# Patient Record
Sex: FEMALE | Race: WHITE | Employment: OTHER | URBAN - METROPOLITAN AREA
[De-identification: names, ages, dates, MRNs, and addresses within clinical notes are randomized per-mention and may not be internally consistent; named-entity substitution may affect disease eponyms.]

---

## 2017-01-18 ENCOUNTER — HOSPITAL ENCOUNTER (OUTPATIENT)
Dept: CT IMAGING | Age: 77
Discharge: HOME | End: 2017-01-18
Attending: INTERNAL MEDICINE | Admitting: NURSE PRACTITIONER

## 2017-01-18 DIAGNOSIS — I71.20 THORACIC AORTIC ANEURYSM WITHOUT RUPTURE: ICD-10-CM

## 2017-04-03 ENCOUNTER — OFFICE VISIT (OUTPATIENT)
Dept: INTERNAL MEDICINE CLINIC | Age: 77
End: 2017-04-03

## 2017-04-03 ENCOUNTER — HOSPITAL ENCOUNTER (OUTPATIENT)
Dept: OTHER | Age: 77
Discharge: HOME | End: 2017-04-03
Attending: NURSE PRACTITIONER | Admitting: NURSE PRACTITIONER

## 2017-04-03 VITALS
TEMPERATURE: 98 F | BODY MASS INDEX: 16.91 KG/M2 | WEIGHT: 97 LBS | DIASTOLIC BLOOD PRESSURE: 82 MMHG | SYSTOLIC BLOOD PRESSURE: 144 MMHG

## 2017-04-03 DIAGNOSIS — M54.50 ACUTE RIGHT-SIDED LOW BACK PAIN WITHOUT SCIATICA: ICD-10-CM

## 2017-04-03 DIAGNOSIS — M54.50 ACUTE RIGHT-SIDED LOW BACK PAIN WITHOUT SCIATICA: Primary | ICD-10-CM

## 2017-04-03 DIAGNOSIS — R09.82 POST-NASAL DRAINAGE: ICD-10-CM

## 2017-04-03 LAB
BILIRUBIN, POC: NORMAL
BLOOD URINE, POC: NORMAL
CLARITY, POC: CLEAR
COLOR, POC: YELLOW
GLUCOSE URINE, POC: NORMAL
KETONES, POC: NORMAL
LEUKOCYTE EST, POC: NORMAL
NITRITE, POC: NORMAL
PH, POC: 6
PROTEIN, POC: NORMAL
SPECIFIC GRAVITY, POC: 1.02
UROBILINOGEN, POC: 0.2

## 2017-04-03 PROCEDURE — G8399 PT W/DXA RESULTS DOCUMENT: HCPCS | Performed by: NURSE PRACTITIONER

## 2017-04-03 PROCEDURE — 1090F PRES/ABSN URINE INCON ASSESS: CPT | Performed by: NURSE PRACTITIONER

## 2017-04-03 PROCEDURE — 99213 OFFICE O/P EST LOW 20 MIN: CPT | Performed by: NURSE PRACTITIONER

## 2017-04-03 PROCEDURE — 4040F PNEUMOC VAC/ADMIN/RCVD: CPT | Performed by: NURSE PRACTITIONER

## 2017-04-03 PROCEDURE — 1036F TOBACCO NON-USER: CPT | Performed by: NURSE PRACTITIONER

## 2017-04-03 PROCEDURE — 1123F ACP DISCUSS/DSCN MKR DOCD: CPT | Performed by: NURSE PRACTITIONER

## 2017-04-03 PROCEDURE — G8419 CALC BMI OUT NRM PARAM NOF/U: HCPCS | Performed by: NURSE PRACTITIONER

## 2017-04-03 PROCEDURE — G8427 DOCREV CUR MEDS BY ELIG CLIN: HCPCS | Performed by: NURSE PRACTITIONER

## 2017-04-03 PROCEDURE — 81002 URINALYSIS NONAUTO W/O SCOPE: CPT | Performed by: NURSE PRACTITIONER

## 2017-04-03 ASSESSMENT — ENCOUNTER SYMPTOMS
NAUSEA: 0
DIARRHEA: 0
VOMITING: 0
SORE THROAT: 0
FACIAL SWELLING: 0
BACK PAIN: 1
SINUS PRESSURE: 0
COUGH: 1

## 2017-04-21 ENCOUNTER — TELEPHONE (OUTPATIENT)
Dept: INTERNAL MEDICINE CLINIC | Age: 77
End: 2017-04-21

## 2017-06-19 ENCOUNTER — TELEPHONE (OUTPATIENT)
Dept: INTERNAL MEDICINE CLINIC | Age: 77
End: 2017-06-19

## 2017-07-06 ENCOUNTER — TELEPHONE (OUTPATIENT)
Dept: INTERNAL MEDICINE CLINIC | Age: 77
End: 2017-07-06

## 2017-07-06 ENCOUNTER — OFFICE VISIT (OUTPATIENT)
Dept: INTERNAL MEDICINE CLINIC | Age: 77
End: 2017-07-06

## 2017-07-06 VITALS
HEART RATE: 60 BPM | SYSTOLIC BLOOD PRESSURE: 154 MMHG | HEIGHT: 64 IN | TEMPERATURE: 98.4 F | DIASTOLIC BLOOD PRESSURE: 98 MMHG | WEIGHT: 94.6 LBS | BODY MASS INDEX: 16.15 KG/M2

## 2017-07-06 DIAGNOSIS — I10 ESSENTIAL HYPERTENSION: ICD-10-CM

## 2017-07-06 DIAGNOSIS — M79.602 LEFT ARM PAIN: Primary | ICD-10-CM

## 2017-07-06 PROCEDURE — 1036F TOBACCO NON-USER: CPT | Performed by: NURSE PRACTITIONER

## 2017-07-06 PROCEDURE — G8427 DOCREV CUR MEDS BY ELIG CLIN: HCPCS | Performed by: NURSE PRACTITIONER

## 2017-07-06 PROCEDURE — 99213 OFFICE O/P EST LOW 20 MIN: CPT | Performed by: NURSE PRACTITIONER

## 2017-07-06 PROCEDURE — G8399 PT W/DXA RESULTS DOCUMENT: HCPCS | Performed by: NURSE PRACTITIONER

## 2017-07-06 PROCEDURE — G8419 CALC BMI OUT NRM PARAM NOF/U: HCPCS | Performed by: NURSE PRACTITIONER

## 2017-07-06 PROCEDURE — 1090F PRES/ABSN URINE INCON ASSESS: CPT | Performed by: NURSE PRACTITIONER

## 2017-07-06 PROCEDURE — 4040F PNEUMOC VAC/ADMIN/RCVD: CPT | Performed by: NURSE PRACTITIONER

## 2017-07-06 PROCEDURE — 1123F ACP DISCUSS/DSCN MKR DOCD: CPT | Performed by: NURSE PRACTITIONER

## 2017-07-06 ASSESSMENT — ENCOUNTER SYMPTOMS
COUGH: 0
BLOOD IN STOOL: 0
COLOR CHANGE: 0
SHORTNESS OF BREATH: 0
ABDOMINAL PAIN: 0
CONSTIPATION: 0

## 2017-07-17 ENCOUNTER — OFFICE VISIT (OUTPATIENT)
Dept: INTERNAL MEDICINE CLINIC | Age: 77
End: 2017-07-17

## 2017-07-17 VITALS
BODY MASS INDEX: 16.39 KG/M2 | SYSTOLIC BLOOD PRESSURE: 134 MMHG | WEIGHT: 94 LBS | DIASTOLIC BLOOD PRESSURE: 78 MMHG | HEART RATE: 68 BPM | OXYGEN SATURATION: 97 %

## 2017-07-17 DIAGNOSIS — I77.9 CAROTID ARTERY DISEASE, UNSPECIFIED LATERALITY (HCC): ICD-10-CM

## 2017-07-17 DIAGNOSIS — R25.2 CRAMPING OF FEET: ICD-10-CM

## 2017-07-17 DIAGNOSIS — M81.0 OSTEOPOROSIS: ICD-10-CM

## 2017-07-17 DIAGNOSIS — I71.20 THORACIC AORTIC ANEURYSM WITHOUT RUPTURE: ICD-10-CM

## 2017-07-17 DIAGNOSIS — I10 ESSENTIAL HYPERTENSION: Primary | ICD-10-CM

## 2017-07-17 DIAGNOSIS — F43.9 STRESS AT HOME: ICD-10-CM

## 2017-07-17 DIAGNOSIS — R63.4 WEIGHT LOSS: ICD-10-CM

## 2017-07-17 DIAGNOSIS — R13.10 DYSPHAGIA, UNSPECIFIED TYPE: ICD-10-CM

## 2017-07-17 DIAGNOSIS — R19.8 CHANGE IN BOWEL MOVEMENT: ICD-10-CM

## 2017-07-17 DIAGNOSIS — E78.2 MIXED HYPERLIPIDEMIA: ICD-10-CM

## 2017-07-17 PROCEDURE — 1036F TOBACCO NON-USER: CPT | Performed by: NURSE PRACTITIONER

## 2017-07-17 PROCEDURE — 4040F PNEUMOC VAC/ADMIN/RCVD: CPT | Performed by: NURSE PRACTITIONER

## 2017-07-17 PROCEDURE — 99214 OFFICE O/P EST MOD 30 MIN: CPT | Performed by: NURSE PRACTITIONER

## 2017-07-17 PROCEDURE — G8399 PT W/DXA RESULTS DOCUMENT: HCPCS | Performed by: NURSE PRACTITIONER

## 2017-07-17 PROCEDURE — 1090F PRES/ABSN URINE INCON ASSESS: CPT | Performed by: NURSE PRACTITIONER

## 2017-07-17 PROCEDURE — G8419 CALC BMI OUT NRM PARAM NOF/U: HCPCS | Performed by: NURSE PRACTITIONER

## 2017-07-17 PROCEDURE — 1123F ACP DISCUSS/DSCN MKR DOCD: CPT | Performed by: NURSE PRACTITIONER

## 2017-07-17 PROCEDURE — 4005F PHARM THX FOR OP RXD: CPT | Performed by: NURSE PRACTITIONER

## 2017-07-17 PROCEDURE — G8427 DOCREV CUR MEDS BY ELIG CLIN: HCPCS | Performed by: NURSE PRACTITIONER

## 2017-07-17 ASSESSMENT — ENCOUNTER SYMPTOMS
SORE THROAT: 0
FACIAL SWELLING: 0
DIARRHEA: 0
VOMITING: 0
ABDOMINAL PAIN: 1
COUGH: 0
NAUSEA: 1
SINUS PRESSURE: 0

## 2017-08-07 ENCOUNTER — HOSPITAL ENCOUNTER (OUTPATIENT)
Dept: CT IMAGING | Age: 77
Discharge: HOME | End: 2017-08-07
Attending: NURSE PRACTITIONER | Admitting: NURSE PRACTITIONER

## 2017-08-07 DIAGNOSIS — R19.8 CHANGE IN BOWEL MOVEMENT: ICD-10-CM

## 2017-08-07 DIAGNOSIS — R63.4 ABNORMAL WEIGHT LOSS: ICD-10-CM

## 2017-08-07 DIAGNOSIS — R63.4 WEIGHT LOSS: ICD-10-CM

## 2017-08-07 DIAGNOSIS — E78.2 MIXED HYPERLIPIDEMIA: ICD-10-CM

## 2017-08-07 DIAGNOSIS — R13.10 DYSPHAGIA, UNSPECIFIED TYPE: ICD-10-CM

## 2017-08-07 LAB
A/G RATIO: 1.2 (ref 1.1–2.2)
ALBUMIN SERPL-MCNC: 3.9 G/DL (ref 3.4–5)
ALP BLD-CCNC: 68 U/L (ref 40–129)
ALT SERPL-CCNC: 19 U/L (ref 10–40)
ANION GAP SERPL CALCULATED.3IONS-SCNC: 10 MMOL/L (ref 3–16)
AST SERPL-CCNC: 27 U/L (ref 15–37)
BASOPHILS ABSOLUTE: 0 K/UL (ref 0–0.2)
BASOPHILS RELATIVE PERCENT: 0.8 %
BILIRUB SERPL-MCNC: 0.4 MG/DL (ref 0–1)
BUN BLDV-MCNC: 31 MG/DL (ref 7–20)
CALCIUM SERPL-MCNC: 10.3 MG/DL (ref 8.3–10.6)
CHLORIDE BLD-SCNC: 101 MMOL/L (ref 99–110)
CO2: 29 MMOL/L (ref 21–32)
CREAT SERPL-MCNC: 0.7 MG/DL (ref 0.6–1.2)
EOSINOPHILS ABSOLUTE: 0.1 K/UL (ref 0–0.6)
EOSINOPHILS RELATIVE PERCENT: 2.6 %
GFR AFRICAN AMERICAN: >60
GFR NON-AFRICAN AMERICAN: >60
GLOBULIN: 3.3 G/DL
GLUCOSE FASTING: 90 MG/DL (ref 70–99)
HCT VFR BLD CALC: 36 % (ref 36–48)
HEMOGLOBIN: 12.2 G/DL (ref 12–16)
LYMPHOCYTES ABSOLUTE: 1.4 K/UL (ref 1–5.1)
LYMPHOCYTES RELATIVE PERCENT: 27.6 %
MCH RBC QN AUTO: 32 PG (ref 26–34)
MCHC RBC AUTO-ENTMCNC: 33.9 G/DL (ref 31–36)
MCV RBC AUTO: 94.6 FL (ref 80–100)
MONOCYTES ABSOLUTE: 0.4 K/UL (ref 0–1.3)
MONOCYTES RELATIVE PERCENT: 8.3 %
NEUTROPHILS ABSOLUTE: 3 K/UL (ref 1.7–7.7)
NEUTROPHILS RELATIVE PERCENT: 60.7 %
PDW BLD-RTO: 14.6 % (ref 12.4–15.4)
PLATELET # BLD: 221 K/UL (ref 135–450)
PMV BLD AUTO: 7.3 FL (ref 5–10.5)
POTASSIUM SERPL-SCNC: 4.2 MMOL/L (ref 3.5–5.1)
RBC # BLD: 3.81 M/UL (ref 4–5.2)
SODIUM BLD-SCNC: 140 MMOL/L (ref 136–145)
TOTAL PROTEIN: 7.2 G/DL (ref 6.4–8.2)
WBC # BLD: 5 K/UL (ref 4–11)

## 2017-08-08 LAB
CHOLESTEROL, FASTING: 192 MG/DL (ref 0–199)
HDLC SERPL-MCNC: 81 MG/DL (ref 40–60)
LDL CHOLESTEROL CALCULATED: 98 MG/DL
TRIGLYCERIDE, FASTING: 63 MG/DL (ref 0–150)
VLDLC SERPL CALC-MCNC: 13 MG/DL

## 2017-08-11 ENCOUNTER — TELEPHONE (OUTPATIENT)
Dept: INTERNAL MEDICINE CLINIC | Age: 77
End: 2017-08-11

## 2017-08-17 ENCOUNTER — TELEPHONE (OUTPATIENT)
Dept: INTERNAL MEDICINE CLINIC | Age: 77
End: 2017-08-17

## 2017-08-17 DIAGNOSIS — R10.84 GENERALIZED ABDOMINAL PAIN: ICD-10-CM

## 2017-08-17 DIAGNOSIS — R93.5 ABNORMAL CT OF THE ABDOMEN: Primary | ICD-10-CM

## 2017-08-17 DIAGNOSIS — R93.5 ABNORMAL CT SCAN, PELVIS: ICD-10-CM

## 2017-08-30 ENCOUNTER — TELEPHONE (OUTPATIENT)
Dept: INTERNAL MEDICINE CLINIC | Age: 77
End: 2017-08-30

## 2017-09-07 ENCOUNTER — HOSPITAL ENCOUNTER (OUTPATIENT)
Dept: CT IMAGING | Age: 77
Discharge: HOME | End: 2017-09-07
Attending: NURSE PRACTITIONER | Admitting: NURSE PRACTITIONER

## 2017-09-07 DIAGNOSIS — R93.5 ABNORMAL CT SCAN, PELVIS: ICD-10-CM

## 2017-09-07 DIAGNOSIS — R10.84 GENERALIZED ABDOMINAL PAIN: ICD-10-CM

## 2017-09-07 DIAGNOSIS — R93.5 ABNORMAL CT OF THE ABDOMEN: ICD-10-CM

## 2017-09-07 DIAGNOSIS — R93.5 ABNORMAL FINDINGS ON DIAGNOSTIC IMAGING OF OTHER ABDOMINAL REGIONS, INCLUDING RETROPERITONEUM: ICD-10-CM

## 2017-09-12 ENCOUNTER — TELEPHONE (OUTPATIENT)
Dept: INTERNAL MEDICINE CLINIC | Age: 77
End: 2017-09-12

## 2017-09-20 ENCOUNTER — TELEPHONE (OUTPATIENT)
Dept: INTERNAL MEDICINE CLINIC | Age: 77
End: 2017-09-20

## 2017-09-20 DIAGNOSIS — R93.5 ABNORMAL CT OF THE ABDOMEN: Primary | ICD-10-CM

## 2017-10-05 ENCOUNTER — HOSPITAL ENCOUNTER (OUTPATIENT)
Dept: MAMMOGRAPHY | Age: 77
Discharge: HOME | End: 2017-10-05
Attending: INTERNAL MEDICINE | Admitting: INTERNAL MEDICINE

## 2017-10-05 DIAGNOSIS — M81.0 AGE-RELATED OSTEOPOROSIS WITHOUT CURRENT PATHOLOGICAL FRACTURE: ICD-10-CM

## 2017-10-05 DIAGNOSIS — M81.0 SENILE OSTEOPOROSIS: ICD-10-CM

## 2017-10-27 DIAGNOSIS — E78.2 MIXED HYPERLIPIDEMIA: ICD-10-CM

## 2017-10-27 RX ORDER — SIMVASTATIN 20 MG
TABLET ORAL
Qty: 90 TABLET | Refills: 0 | Status: SHIPPED | OUTPATIENT
Start: 2017-10-27 | End: 2018-01-29 | Stop reason: SDUPTHER

## 2017-10-27 NOTE — TELEPHONE ENCOUNTER
Refill request for zocor medication.      Name of [facility]- [facility]    Last visit - 7-17-17     Pending visit - 1-15-18    Last refill -6-19-17    Medication Contract signed -   Hermann mena-     Additional Comments

## 2017-11-17 ENCOUNTER — NURSE ONLY (OUTPATIENT)
Dept: INTERNAL MEDICINE CLINIC | Age: 77
End: 2017-11-17

## 2017-11-17 DIAGNOSIS — Z23 NEED FOR PROPHYLACTIC VACCINATION AND INOCULATION AGAINST INFLUENZA: Primary | ICD-10-CM

## 2017-11-17 PROCEDURE — G0008 ADMIN INFLUENZA VIRUS VAC: HCPCS | Performed by: NURSE PRACTITIONER

## 2017-11-17 PROCEDURE — 90662 IIV NO PRSV INCREASED AG IM: CPT | Performed by: NURSE PRACTITIONER

## 2017-11-17 NOTE — PROGRESS NOTES
Vaccine Information Sheet, \"Influenza - Inactivated\"  given to Rickeytommie Valadezs, or parent/legal guardian of  Rickey Washington and verbalized understanding. Patient responses:    Have you ever had a reaction to a flu vaccine? No  Are you able to eat eggs without adverse effects? Yes  Do you have any current illness? No  Have you ever had [place] Syndrome? Yes    Flu vaccine given per order. Please see immunization tab.

## 2017-12-07 DIAGNOSIS — I10 ESSENTIAL HYPERTENSION: ICD-10-CM

## 2017-12-07 RX ORDER — METOPROLOL SUCCINATE 50 MG/1
50 TABLET, EXTENDED RELEASE ORAL DAILY
Qty: 90 TABLET | Refills: 0 | Status: SHIPPED | OUTPATIENT
Start: 2017-12-07 | End: 2019-01-22 | Stop reason: SDUPTHER

## 2017-12-07 NOTE — TELEPHONE ENCOUNTER
Refill request for -----       Metoprolol ER 50 mg           medication.      [id number]    Last visit - 07/17/17     Pending visit -01/15/18    Last refill -12/19/16

## 2018-01-29 ENCOUNTER — OFFICE VISIT (OUTPATIENT)
Dept: INTERNAL MEDICINE CLINIC | Age: 78
End: 2018-01-29

## 2018-01-29 VITALS
SYSTOLIC BLOOD PRESSURE: 136 MMHG | OXYGEN SATURATION: 95 % | HEART RATE: 81 BPM | DIASTOLIC BLOOD PRESSURE: 74 MMHG | WEIGHT: 98 LBS | BODY MASS INDEX: 17.09 KG/M2

## 2018-01-29 DIAGNOSIS — I10 ESSENTIAL HYPERTENSION: ICD-10-CM

## 2018-01-29 DIAGNOSIS — E78.2 MIXED HYPERLIPIDEMIA: ICD-10-CM

## 2018-01-29 DIAGNOSIS — M79.10 MYALGIA: ICD-10-CM

## 2018-01-29 DIAGNOSIS — I77.1 CELIAC ARTERY STENOSIS (HCC): Primary | ICD-10-CM

## 2018-01-29 DIAGNOSIS — I71.20 THORACIC AORTIC ANEURYSM WITHOUT RUPTURE: ICD-10-CM

## 2018-01-29 DIAGNOSIS — M81.0 MENOPAUSAL OSTEOPOROSIS: ICD-10-CM

## 2018-01-29 PROCEDURE — 1090F PRES/ABSN URINE INCON ASSESS: CPT | Performed by: NURSE PRACTITIONER

## 2018-01-29 PROCEDURE — G8419 CALC BMI OUT NRM PARAM NOF/U: HCPCS | Performed by: NURSE PRACTITIONER

## 2018-01-29 PROCEDURE — 1036F TOBACCO NON-USER: CPT | Performed by: NURSE PRACTITIONER

## 2018-01-29 PROCEDURE — G8399 PT W/DXA RESULTS DOCUMENT: HCPCS | Performed by: NURSE PRACTITIONER

## 2018-01-29 PROCEDURE — 99213 OFFICE O/P EST LOW 20 MIN: CPT | Performed by: NURSE PRACTITIONER

## 2018-01-29 PROCEDURE — 1123F ACP DISCUSS/DSCN MKR DOCD: CPT | Performed by: NURSE PRACTITIONER

## 2018-01-29 PROCEDURE — G8427 DOCREV CUR MEDS BY ELIG CLIN: HCPCS | Performed by: NURSE PRACTITIONER

## 2018-01-29 PROCEDURE — G8484 FLU IMMUNIZE NO ADMIN: HCPCS | Performed by: NURSE PRACTITIONER

## 2018-01-29 PROCEDURE — 4040F PNEUMOC VAC/ADMIN/RCVD: CPT | Performed by: NURSE PRACTITIONER

## 2018-01-29 ASSESSMENT — PATIENT HEALTH QUESTIONNAIRE - PHQ9
SUM OF ALL RESPONSES TO PHQ9 QUESTIONS 1 & 2: 0
2. FEELING DOWN, DEPRESSED OR HOPELESS: 0
SUM OF ALL RESPONSES TO PHQ QUESTIONS 1-9: 0
1. LITTLE INTEREST OR PLEASURE IN DOING THINGS: 0

## 2018-01-30 RX ORDER — SIMVASTATIN 20 MG
TABLET ORAL
Qty: 90 TABLET | Refills: 0 | Status: SHIPPED | OUTPATIENT
Start: 2018-01-30 | End: 2018-05-02 | Stop reason: SDUPTHER

## 2018-01-30 ASSESSMENT — ENCOUNTER SYMPTOMS
SINUS PRESSURE: 0
COUGH: 0
DIARRHEA: 0
VOMITING: 0
NAUSEA: 0
SORE THROAT: 0
FACIAL SWELLING: 0

## 2018-01-30 NOTE — TELEPHONE ENCOUNTER
Refill request for      -       Simvastatin 20 MG       medication.      [id number]    Last visit -01/29/18     Pending visit - 07/30/18    Last refill 10/27/17

## 2018-01-31 NOTE — PROGRESS NOTES
Patient ID: Angelika Heredia is a 76 y.o. female. HPI   Chief Complaint   Patient presents with    Hypertension    Hyperlipidemia       Thoracic aortic aneurysm - Had recent f/u with Dr. Bela Vidal (Vascular with [facility]). Dr. 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 She states she is not interested in angiography with stenting.      CAD - Denies concerns. Denies CP/SOB/SCOTT. Managed by Dr. Bernabe Kelley     HTN. Metoprolol daily. No concerns. Does not check BP at home.      Hyperlipidemia. Zocor daily. Denies myalgias. Osteoporosis. She is eating well and denies any concerns at home. She is still going to [employer] often and has good support there. Prior to Visit Medications    Medication Sig Taking? Authorizing Provider   metoprolol succinate (TOPROL XL) 50 MG extended release tablet TAKE 1 TABLET BY MOUTH DAILY Yes Kellie Beaulieu CNP   Glucosamine-Chondroitin 750-600 MG CHEW Take  by mouth 2 times daily. Yes Historical Provider, MD   magnesium (MAGNESIUM-OXIDE) 250 MG TABS tablet Take 250 mg by mouth daily. Yes Historical Provider, MD   Coenzyme Q10 (COQ10) 50 MG CAPS Take  by mouth 2 times daily. Yes Historical Provider, MD   Flaxseed, Linseed, (FLAX SEED OIL) 1000 MG CAPS Take 1,000 mg by mouth daily. Yes Historical Provider, MD   Vitamin D 3 (CHOLECALCIFEROL) 1000 UNITS TABS tablet Take 1,000 Units by mouth daily. Yes Historical Provider, MD   aspirin 325 MG tablet Take 325 mg by mouth daily. Yes Historical Provider, MD   fish oil-omega-3 fatty acids 1000 MG capsule Take 1 g by mouth daily. Yes Historical Provider, MD   therapeutic multivitamin-minerals (THERAGRAN-M) tablet Take 1 tablet by mouth daily. Yes Historical Provider, MD   vitamin E 400 UNIT capsule Take 400 Units by mouth daily. Yes Historical Provider, MD   Calcium Citrate-Vitamin D 200-250 MG-UNIT TABS Take  by mouth.    Yes Historical Provider, MD   Ascorbic Acid (VITAMIN C) 500 MG tablet Take 1,000 mg by mouth daily. Yes Historical Provider, MD   folic acid (FOLVITE) 1 MG tablet Take 400 mg by mouth daily. Yes Historical Provider, MD   Zinc 50 MG CAPS Take  by mouth. Yes Historical Provider, MD   simvastatin (ZOCOR) 20 MG tablet TAKE 1 TABLET BY MOUTH DAILY  David Sousa CNP     Social History     Social History    Marital status:      Spouse name: N/A    Number of children: N/A    Years of education: N/A     Occupational History    Not on file. Social History Main Topics    Smoking status: Never Smoker    Smokeless tobacco: Never Used    Alcohol use No    Drug use: No    Sexual activity: Not Currently     Other Topics Concern    Not on file     Social History Narrative    No narrative on file     Family History   Problem Relation Age of Onset    Heart Disease Sister     Stroke Mother        Review of Systems   Constitutional: Negative for appetite change, chills, fatigue, fever and unexpected weight change. HENT: Negative for congestion, ear discharge, ear pain, facial swelling, hearing loss, sinus pressure, sneezing and sore throat. Respiratory: Negative for cough. Cardiovascular: Negative for chest pain. Gastrointestinal: Negative for diarrhea, nausea and vomiting. Genitourinary: Negative for difficulty urinating, dysuria, hematuria and urgency. Musculoskeletal: Negative for arthralgias and gait problem. Neurological: Negative for dizziness, weakness and headaches. Hematological: Negative for adenopathy. Psychiatric/Behavioral: Negative for sleep disturbance and suicidal ideas. Objective:   Physical Exam   Constitutional: She is oriented to person, place, and time. She appears well-developed and well-nourished. No distress. HENT:   Head: Normocephalic and atraumatic. Cardiovascular: Normal rate, regular rhythm and normal heart sounds. Pulmonary/Chest: Effort normal and breath sounds normal. She has no wheezes.  She has no

## 2018-05-02 DIAGNOSIS — E78.2 MIXED HYPERLIPIDEMIA: ICD-10-CM

## 2018-05-02 RX ORDER — SIMVASTATIN 20 MG
TABLET ORAL
Qty: 90 TABLET | Refills: 0 | Status: SHIPPED | OUTPATIENT
Start: 2018-05-02 | End: 2018-07-26 | Stop reason: SDUPTHER

## 2018-07-26 DIAGNOSIS — E78.2 MIXED HYPERLIPIDEMIA: ICD-10-CM

## 2018-07-26 RX ORDER — SIMVASTATIN 20 MG
TABLET ORAL
Qty: 90 TABLET | Refills: 0 | Status: SHIPPED | OUTPATIENT
Start: 2018-07-26 | End: 2018-10-18 | Stop reason: SDUPTHER

## 2018-07-30 ENCOUNTER — OFFICE VISIT (OUTPATIENT)
Dept: INTERNAL MEDICINE CLINIC | Age: 78
End: 2018-07-30

## 2018-07-30 VITALS
BODY MASS INDEX: 17.79 KG/M2 | DIASTOLIC BLOOD PRESSURE: 92 MMHG | OXYGEN SATURATION: 95 % | WEIGHT: 102 LBS | SYSTOLIC BLOOD PRESSURE: 146 MMHG | HEART RATE: 62 BPM

## 2018-07-30 DIAGNOSIS — I10 ESSENTIAL HYPERTENSION: ICD-10-CM

## 2018-07-30 DIAGNOSIS — I71.20 THORACIC AORTIC ANEURYSM WITHOUT RUPTURE: ICD-10-CM

## 2018-07-30 DIAGNOSIS — G89.29 CHRONIC RIGHT-SIDED THORACIC BACK PAIN: ICD-10-CM

## 2018-07-30 DIAGNOSIS — Z23 NEED FOR SHINGLES VACCINE: Primary | ICD-10-CM

## 2018-07-30 DIAGNOSIS — M54.6 CHRONIC RIGHT-SIDED THORACIC BACK PAIN: ICD-10-CM

## 2018-07-30 DIAGNOSIS — I77.1 CELIAC ARTERY STENOSIS (HCC): ICD-10-CM

## 2018-07-30 DIAGNOSIS — E78.2 MIXED HYPERLIPIDEMIA: ICD-10-CM

## 2018-07-30 DIAGNOSIS — G89.29 CHRONIC ABDOMINAL PAIN: ICD-10-CM

## 2018-07-30 DIAGNOSIS — R10.9 CHRONIC ABDOMINAL PAIN: ICD-10-CM

## 2018-07-30 DIAGNOSIS — Z13.220 SCREENING, LIPID: ICD-10-CM

## 2018-07-30 DIAGNOSIS — M81.0 OSTEOPOROSIS, UNSPECIFIED OSTEOPOROSIS TYPE, UNSPECIFIED PATHOLOGICAL FRACTURE PRESENCE: ICD-10-CM

## 2018-07-30 DIAGNOSIS — R53.83 FATIGUE, UNSPECIFIED TYPE: ICD-10-CM

## 2018-07-30 DIAGNOSIS — R30.0 DYSURIA: ICD-10-CM

## 2018-07-30 LAB
BILIRUBIN, POC: NORMAL
BLOOD URINE, POC: NORMAL
CLARITY, POC: CLEAR
COLOR, POC: YELLOW
GLUCOSE URINE, POC: NORMAL
KETONES, POC: NORMAL
LEUKOCYTE EST, POC: NORMAL
NITRITE, POC: NORMAL
PH, POC: 5
PROTEIN, POC: NORMAL
SPECIFIC GRAVITY, POC: 1.02
UROBILINOGEN, POC: 0.2

## 2018-07-30 PROCEDURE — 1123F ACP DISCUSS/DSCN MKR DOCD: CPT | Performed by: NURSE PRACTITIONER

## 2018-07-30 PROCEDURE — 4040F PNEUMOC VAC/ADMIN/RCVD: CPT | Performed by: NURSE PRACTITIONER

## 2018-07-30 PROCEDURE — G8399 PT W/DXA RESULTS DOCUMENT: HCPCS | Performed by: NURSE PRACTITIONER

## 2018-07-30 PROCEDURE — G8419 CALC BMI OUT NRM PARAM NOF/U: HCPCS | Performed by: NURSE PRACTITIONER

## 2018-07-30 PROCEDURE — G8427 DOCREV CUR MEDS BY ELIG CLIN: HCPCS | Performed by: NURSE PRACTITIONER

## 2018-07-30 PROCEDURE — 99214 OFFICE O/P EST MOD 30 MIN: CPT | Performed by: NURSE PRACTITIONER

## 2018-07-30 PROCEDURE — 81002 URINALYSIS NONAUTO W/O SCOPE: CPT | Performed by: NURSE PRACTITIONER

## 2018-07-30 PROCEDURE — 1101F PT FALLS ASSESS-DOCD LE1/YR: CPT | Performed by: NURSE PRACTITIONER

## 2018-07-30 PROCEDURE — 1090F PRES/ABSN URINE INCON ASSESS: CPT | Performed by: NURSE PRACTITIONER

## 2018-07-30 PROCEDURE — 1036F TOBACCO NON-USER: CPT | Performed by: NURSE PRACTITIONER

## 2018-07-30 ASSESSMENT — ENCOUNTER SYMPTOMS
DIARRHEA: 0
NAUSEA: 0
COUGH: 0
VOMITING: 0
ABDOMINAL PAIN: 1
SORE THROAT: 0
FACIAL SWELLING: 0
SINUS PRESSURE: 0

## 2018-07-30 NOTE — PROGRESS NOTES
Provider, MD   fish oil-omega-3 fatty acids 1000 MG capsule Take 1 g by mouth daily. Yes Historical Provider, MD   therapeutic multivitamin-minerals (THERAGRAN-M) tablet Take 1 tablet by mouth daily. Yes Historical Provider, MD   vitamin E 400 UNIT capsule Take 400 Units by mouth daily. Yes Historical Provider, MD   Calcium Citrate-Vitamin D 200-250 MG-UNIT TABS Take  by mouth. Yes Historical Provider, MD   Ascorbic Acid (VITAMIN C) 500 MG tablet Take 1,000 mg by mouth daily. Yes Historical Provider, MD   folic acid (FOLVITE) 1 MG tablet Take 400 mg by mouth daily. Yes Historical Provider, MD   Zinc 50 MG CAPS Take  by mouth. Yes Historical Provider, MD     Social History     Social History    Marital status:      Spouse name: N/A    Number of children: N/A    Years of education: N/A     Occupational History    Not on file. Social History Main Topics    Smoking status: Never Smoker    Smokeless tobacco: Never Used    Alcohol use No    Drug use: No    Sexual activity: Not Currently     Other Topics Concern    Not on file     Social History Narrative    No narrative on file     Family History   Problem Relation Age of Onset    Heart Disease Sister     Stroke Mother        Review of Systems   Constitutional: Negative for appetite change, chills, fatigue, fever and unexpected weight change. HENT: Negative for congestion, ear discharge, ear pain, facial swelling, hearing loss, sinus pressure, sneezing and sore throat. Respiratory: Negative for cough. Cardiovascular: Negative for chest pain. Gastrointestinal: Positive for abdominal pain (intermittent). Negative for diarrhea, nausea and vomiting. Genitourinary: Positive for flank pain (Rt). Negative for difficulty urinating, dysuria, hematuria and urgency. Musculoskeletal: Negative for arthralgias and gait problem. Neurological: Negative for dizziness, weakness and headaches. Hematological: Negative for adenopathy.

## 2018-07-31 ENCOUNTER — HOSPITAL ENCOUNTER (OUTPATIENT)
Age: 78
Discharge: HOME | End: 2018-07-31
Payer: MEDICARE

## 2018-07-31 DIAGNOSIS — R10.9 CHRONIC ABDOMINAL PAIN: ICD-10-CM

## 2018-07-31 DIAGNOSIS — G89.29 CHRONIC ABDOMINAL PAIN: ICD-10-CM

## 2018-07-31 DIAGNOSIS — Z13.220 SCREENING, LIPID: ICD-10-CM

## 2018-07-31 DIAGNOSIS — R53.83 FATIGUE, UNSPECIFIED TYPE: ICD-10-CM

## 2018-07-31 LAB
A/G RATIO: 1.3 (ref 1.1–2.2)
ALBUMIN SERPL-MCNC: 4.3 G/DL (ref 3.4–5)
ALP BLD-CCNC: 74 U/L (ref 40–129)
ALT SERPL-CCNC: 13 U/L (ref 10–40)
ANION GAP SERPL CALCULATED.3IONS-SCNC: 11 MMOL/L (ref 3–16)
AST SERPL-CCNC: 23 U/L (ref 15–37)
BASOPHILS ABSOLUTE: 0 K/UL (ref 0–0.2)
BASOPHILS RELATIVE PERCENT: 0.9 %
BILIRUB SERPL-MCNC: 0.5 MG/DL (ref 0–1)
BUN BLDV-MCNC: 23 MG/DL (ref 7–20)
CALCIUM SERPL-MCNC: 10.1 MG/DL (ref 8.3–10.6)
CHLORIDE BLD-SCNC: 98 MMOL/L (ref 99–110)
CHOLESTEROL, TOTAL: 139 MG/DL (ref 0–199)
CO2: 27 MMOL/L (ref 21–32)
CREAT SERPL-MCNC: 0.8 MG/DL (ref 0.6–1.2)
EOSINOPHILS ABSOLUTE: 0.2 K/UL (ref 0–0.6)
EOSINOPHILS RELATIVE PERCENT: 3 %
FERRITIN: 93 NG/ML (ref 15–150)
FOLATE: >20 NG/ML (ref 4.78–24.2)
GFR AFRICAN AMERICAN: >60
GFR NON-AFRICAN AMERICAN: >60
GLOBULIN: 3.2 G/DL
GLUCOSE BLD-MCNC: 75 MG/DL (ref 70–99)
HCT VFR BLD CALC: 39 % (ref 36–48)
HDLC SERPL-MCNC: 74 MG/DL (ref 40–60)
HEMOGLOBIN: 13.2 G/DL (ref 12–16)
IRON SATURATION: 22 % (ref 15–50)
IRON: 72 UG/DL (ref 37–145)
LDL CHOLESTEROL CALCULATED: 53 MG/DL
LYMPHOCYTES ABSOLUTE: 1.5 K/UL (ref 1–5.1)
LYMPHOCYTES RELATIVE PERCENT: 30.1 %
MCH RBC QN AUTO: 32.2 PG (ref 26–34)
MCHC RBC AUTO-ENTMCNC: 33.9 G/DL (ref 31–36)
MCV RBC AUTO: 94.9 FL (ref 80–100)
MONOCYTES ABSOLUTE: 0.4 K/UL (ref 0–1.3)
MONOCYTES RELATIVE PERCENT: 7.8 %
NEUTROPHILS ABSOLUTE: 3 K/UL (ref 1.7–7.7)
NEUTROPHILS RELATIVE PERCENT: 58.2 %
PDW BLD-RTO: 13.7 % (ref 12.4–15.4)
PLATELET # BLD: 214 K/UL (ref 135–450)
PMV BLD AUTO: 7.8 FL (ref 5–10.5)
POTASSIUM SERPL-SCNC: 4.2 MMOL/L (ref 3.5–5.1)
RBC # BLD: 4.11 M/UL (ref 4–5.2)
SODIUM BLD-SCNC: 136 MMOL/L (ref 136–145)
TOTAL IRON BINDING CAPACITY: 329 UG/DL (ref 260–445)
TOTAL PROTEIN: 7.5 G/DL (ref 6.4–8.2)
TRIGL SERPL-MCNC: 62 MG/DL (ref 0–150)
VITAMIN B-12: 913 PG/ML (ref 211–911)
VLDLC SERPL CALC-MCNC: 12 MG/DL
WBC # BLD: 5.1 K/UL (ref 4–11)

## 2018-07-31 PROCEDURE — 83550 IRON BINDING TEST: CPT

## 2018-07-31 PROCEDURE — 80061 LIPID PANEL: CPT

## 2018-07-31 PROCEDURE — 80053 COMPREHEN METABOLIC PANEL: CPT

## 2018-07-31 PROCEDURE — 82607 VITAMIN B-12: CPT

## 2018-07-31 PROCEDURE — 36415 COLL VENOUS BLD VENIPUNCTURE: CPT

## 2018-07-31 PROCEDURE — 83540 ASSAY OF IRON: CPT

## 2018-07-31 PROCEDURE — 82746 ASSAY OF FOLIC ACID SERUM: CPT

## 2018-07-31 PROCEDURE — 85025 COMPLETE CBC W/AUTO DIFF WBC: CPT

## 2018-07-31 PROCEDURE — 82728 ASSAY OF FERRITIN: CPT

## 2018-08-01 LAB — URINE CULTURE, ROUTINE: NORMAL

## 2018-10-18 DIAGNOSIS — E78.2 MIXED HYPERLIPIDEMIA: ICD-10-CM

## 2018-10-18 RX ORDER — SIMVASTATIN 20 MG
20 TABLET ORAL DAILY
Qty: 90 TABLET | Refills: 0 | Status: SHIPPED | OUTPATIENT
Start: 2018-10-18 | End: 2019-01-19 | Stop reason: SDUPTHER

## 2018-10-18 NOTE — TELEPHONE ENCOUNTER
Refill request for Simvastatin medication.      Name of [facility]    Last visit - 7/30/18     Pending visit - 1/28/19    Last refill -7/26/18  0 refills

## 2019-01-19 DIAGNOSIS — E78.2 MIXED HYPERLIPIDEMIA: ICD-10-CM

## 2019-01-19 RX ORDER — SIMVASTATIN 20 MG
20 TABLET ORAL DAILY
Qty: 90 TABLET | Refills: 0 | Status: SHIPPED | OUTPATIENT
Start: 2019-01-19 | End: 2019-01-22 | Stop reason: SDUPTHER

## 2019-01-22 DIAGNOSIS — E78.2 MIXED HYPERLIPIDEMIA: ICD-10-CM

## 2019-01-22 DIAGNOSIS — I10 ESSENTIAL HYPERTENSION: ICD-10-CM

## 2019-01-23 ENCOUNTER — TELEPHONE (OUTPATIENT)
Dept: INTERNAL MEDICINE CLINIC | Age: 79
End: 2019-01-23

## 2019-01-23 RX ORDER — METOPROLOL SUCCINATE 50 MG/1
50 TABLET, EXTENDED RELEASE ORAL DAILY
Qty: 90 TABLET | Refills: 0 | Status: SHIPPED | OUTPATIENT
Start: 2019-01-23 | End: 2019-01-28 | Stop reason: SDUPTHER

## 2019-01-23 RX ORDER — SIMVASTATIN 20 MG
20 TABLET ORAL DAILY
Qty: 90 TABLET | Refills: 0 | Status: SHIPPED | OUTPATIENT
Start: 2019-01-23 | End: 2019-04-08 | Stop reason: SDUPTHER

## 2019-01-28 ENCOUNTER — HOSPITAL ENCOUNTER (OUTPATIENT)
Age: 79
Discharge: HOME | End: 2019-01-28
Payer: MEDICARE

## 2019-01-28 ENCOUNTER — OFFICE VISIT (OUTPATIENT)
Dept: INTERNAL MEDICINE CLINIC | Age: 79
End: 2019-01-28
Payer: MEDICARE

## 2019-01-28 VITALS
OXYGEN SATURATION: 95 % | BODY MASS INDEX: 18.43 KG/M2 | DIASTOLIC BLOOD PRESSURE: 82 MMHG | HEART RATE: 78 BPM | WEIGHT: 104 LBS | SYSTOLIC BLOOD PRESSURE: 142 MMHG | HEIGHT: 63 IN

## 2019-01-28 DIAGNOSIS — I10 ESSENTIAL HYPERTENSION: Primary | ICD-10-CM

## 2019-01-28 DIAGNOSIS — R42 LIGHTHEADED: ICD-10-CM

## 2019-01-28 DIAGNOSIS — J47.9 BRONCHIECTASIS WITHOUT COMPLICATION (HCC): ICD-10-CM

## 2019-01-28 DIAGNOSIS — I25.84 CORONARY ARTERY CALCIFICATION: ICD-10-CM

## 2019-01-28 DIAGNOSIS — I71.20 THORACIC AORTIC ANEURYSM WITHOUT RUPTURE: ICD-10-CM

## 2019-01-28 DIAGNOSIS — I77.1 CELIAC ARTERY STENOSIS (HCC): ICD-10-CM

## 2019-01-28 DIAGNOSIS — R93.5 ABNORMAL CT OF THE ABDOMEN: ICD-10-CM

## 2019-01-28 DIAGNOSIS — E78.2 MIXED HYPERLIPIDEMIA: ICD-10-CM

## 2019-01-28 DIAGNOSIS — M81.0 OSTEOPOROSIS, UNSPECIFIED OSTEOPOROSIS TYPE, UNSPECIFIED PATHOLOGICAL FRACTURE PRESENCE: ICD-10-CM

## 2019-01-28 DIAGNOSIS — R32 URINARY INCONTINENCE, UNSPECIFIED TYPE: ICD-10-CM

## 2019-01-28 DIAGNOSIS — R10.31 RIGHT LOWER QUADRANT ABDOMINAL PAIN: ICD-10-CM

## 2019-01-28 DIAGNOSIS — I25.10 CORONARY ARTERY CALCIFICATION: ICD-10-CM

## 2019-01-28 LAB
A/G RATIO: 1.1 (ref 1.1–2.2)
ALBUMIN SERPL-MCNC: 4 G/DL (ref 3.4–5)
ALP BLD-CCNC: 89 U/L (ref 40–129)
ALT SERPL-CCNC: 15 U/L (ref 10–40)
ANION GAP SERPL CALCULATED.3IONS-SCNC: 13 MMOL/L (ref 3–16)
AST SERPL-CCNC: 25 U/L (ref 15–37)
BASOPHILS ABSOLUTE: 0 K/UL (ref 0–0.2)
BASOPHILS RELATIVE PERCENT: 0.7 %
BILIRUB SERPL-MCNC: 0.3 MG/DL (ref 0–1)
BUN BLDV-MCNC: 26 MG/DL (ref 7–20)
CALCIUM SERPL-MCNC: 10.7 MG/DL (ref 8.3–10.6)
CHLORIDE BLD-SCNC: 101 MMOL/L (ref 99–110)
CO2: 28 MMOL/L (ref 21–32)
CREAT SERPL-MCNC: 1 MG/DL (ref 0.6–1.2)
EOSINOPHILS ABSOLUTE: 0.3 K/UL (ref 0–0.6)
EOSINOPHILS RELATIVE PERCENT: 6.3 %
GFR AFRICAN AMERICAN: >60
GFR NON-AFRICAN AMERICAN: 53
GLOBULIN: 3.6 G/DL
GLUCOSE BLD-MCNC: 77 MG/DL (ref 70–99)
HCT VFR BLD CALC: 36.9 % (ref 36–48)
HEMOGLOBIN: 12.6 G/DL (ref 12–16)
LYMPHOCYTES ABSOLUTE: 1.4 K/UL (ref 1–5.1)
LYMPHOCYTES RELATIVE PERCENT: 25.1 %
MCH RBC QN AUTO: 32.8 PG (ref 26–34)
MCHC RBC AUTO-ENTMCNC: 34 G/DL (ref 31–36)
MCV RBC AUTO: 96.4 FL (ref 80–100)
MONOCYTES ABSOLUTE: 0.5 K/UL (ref 0–1.3)
MONOCYTES RELATIVE PERCENT: 9.7 %
NEUTROPHILS ABSOLUTE: 3.2 K/UL (ref 1.7–7.7)
NEUTROPHILS RELATIVE PERCENT: 58.2 %
PDW BLD-RTO: 13.8 % (ref 12.4–15.4)
PLATELET # BLD: 244 K/UL (ref 135–450)
PMV BLD AUTO: 7.5 FL (ref 5–10.5)
POTASSIUM SERPL-SCNC: 4.1 MMOL/L (ref 3.5–5.1)
RBC # BLD: 3.83 M/UL (ref 4–5.2)
SODIUM BLD-SCNC: 142 MMOL/L (ref 136–145)
TOTAL PROTEIN: 7.6 G/DL (ref 6.4–8.2)
WBC # BLD: 5.5 K/UL (ref 4–11)

## 2019-01-28 PROCEDURE — G8427 DOCREV CUR MEDS BY ELIG CLIN: HCPCS | Performed by: NURSE PRACTITIONER

## 2019-01-28 PROCEDURE — 80053 COMPREHEN METABOLIC PANEL: CPT

## 2019-01-28 PROCEDURE — 85025 COMPLETE CBC W/AUTO DIFF WBC: CPT

## 2019-01-28 PROCEDURE — 1090F PRES/ABSN URINE INCON ASSESS: CPT | Performed by: NURSE PRACTITIONER

## 2019-01-28 PROCEDURE — 81002 URINALYSIS NONAUTO W/O SCOPE: CPT | Performed by: NURSE PRACTITIONER

## 2019-01-28 PROCEDURE — 1123F ACP DISCUSS/DSCN MKR DOCD: CPT | Performed by: NURSE PRACTITIONER

## 2019-01-28 PROCEDURE — G8399 PT W/DXA RESULTS DOCUMENT: HCPCS | Performed by: NURSE PRACTITIONER

## 2019-01-28 PROCEDURE — G8419 CALC BMI OUT NRM PARAM NOF/U: HCPCS | Performed by: NURSE PRACTITIONER

## 2019-01-28 PROCEDURE — 1101F PT FALLS ASSESS-DOCD LE1/YR: CPT | Performed by: NURSE PRACTITIONER

## 2019-01-28 PROCEDURE — 1036F TOBACCO NON-USER: CPT | Performed by: NURSE PRACTITIONER

## 2019-01-28 PROCEDURE — 4040F PNEUMOC VAC/ADMIN/RCVD: CPT | Performed by: NURSE PRACTITIONER

## 2019-01-28 PROCEDURE — 36415 COLL VENOUS BLD VENIPUNCTURE: CPT

## 2019-01-28 PROCEDURE — G8484 FLU IMMUNIZE NO ADMIN: HCPCS | Performed by: NURSE PRACTITIONER

## 2019-01-28 PROCEDURE — 99214 OFFICE O/P EST MOD 30 MIN: CPT | Performed by: NURSE PRACTITIONER

## 2019-01-28 PROCEDURE — 0509F URINE INCON PLAN DOCD: CPT | Performed by: NURSE PRACTITIONER

## 2019-01-28 PROCEDURE — G8598 ASA/ANTIPLAT THER USED: HCPCS | Performed by: NURSE PRACTITIONER

## 2019-01-28 RX ORDER — METOPROLOL SUCCINATE 25 MG/1
75 TABLET, EXTENDED RELEASE ORAL DAILY
Qty: 90 TABLET | Refills: 0
Start: 2019-01-28 | End: 2019-04-10 | Stop reason: SDUPTHER

## 2019-01-31 PROBLEM — I25.84 CORONARY ARTERY CALCIFICATION: Status: ACTIVE | Noted: 2019-01-31

## 2019-01-31 PROBLEM — I25.10 CORONARY ARTERY CALCIFICATION: Status: ACTIVE | Noted: 2019-01-31

## 2019-01-31 PROBLEM — J47.9 BRONCHIECTASIS WITHOUT COMPLICATION (HCC): Status: ACTIVE | Noted: 2019-01-31

## 2019-01-31 ASSESSMENT — ENCOUNTER SYMPTOMS
SINUS PRESSURE: 0
ABDOMINAL PAIN: 1
FACIAL SWELLING: 0
DIARRHEA: 0
NAUSEA: 0
SORE THROAT: 0
VOMITING: 0
COUGH: 0

## 2019-02-05 ENCOUNTER — TELEPHONE (OUTPATIENT)
Dept: INTERNAL MEDICINE CLINIC | Age: 79
End: 2019-02-05

## 2019-02-13 ENCOUNTER — TELEPHONE (OUTPATIENT)
Dept: INTERNAL MEDICINE CLINIC | Age: 79
End: 2019-02-13

## 2019-02-25 DIAGNOSIS — R10.31 RIGHT LOWER QUADRANT ABDOMINAL PAIN: ICD-10-CM

## 2019-02-25 DIAGNOSIS — R93.5 ABNORMAL CT OF THE ABDOMEN: ICD-10-CM

## 2019-04-08 DIAGNOSIS — I10 ESSENTIAL HYPERTENSION: ICD-10-CM

## 2019-04-08 DIAGNOSIS — E78.2 MIXED HYPERLIPIDEMIA: ICD-10-CM

## 2019-04-10 RX ORDER — SIMVASTATIN 20 MG
TABLET ORAL
Qty: 90 TABLET | Refills: 1 | Status: SHIPPED | OUTPATIENT
Start: 2019-04-10 | End: 2019-10-05 | Stop reason: SDUPTHER

## 2019-04-10 RX ORDER — METOPROLOL SUCCINATE 50 MG/1
TABLET, EXTENDED RELEASE ORAL
Qty: 90 TABLET | Refills: 1 | Status: SHIPPED | OUTPATIENT
Start: 2019-04-10 | End: 2019-10-05 | Stop reason: SDUPTHER

## 2019-07-08 ENCOUNTER — TELEPHONE (OUTPATIENT)
Dept: INTERNAL MEDICINE CLINIC | Age: 79
End: 2019-07-08

## 2019-07-10 ENCOUNTER — OFFICE VISIT (OUTPATIENT)
Dept: INTERNAL MEDICINE CLINIC | Age: 79
End: 2019-07-10
Payer: MEDICARE

## 2019-07-10 VITALS
OXYGEN SATURATION: 98 % | SYSTOLIC BLOOD PRESSURE: 128 MMHG | HEART RATE: 67 BPM | BODY MASS INDEX: 18.6 KG/M2 | WEIGHT: 105 LBS | DIASTOLIC BLOOD PRESSURE: 88 MMHG

## 2019-07-10 DIAGNOSIS — M54.50 ACUTE RIGHT-SIDED LOW BACK PAIN WITHOUT SCIATICA: Primary | ICD-10-CM

## 2019-07-10 DIAGNOSIS — I10 ESSENTIAL HYPERTENSION: ICD-10-CM

## 2019-07-10 PROCEDURE — 1090F PRES/ABSN URINE INCON ASSESS: CPT | Performed by: NURSE PRACTITIONER

## 2019-07-10 PROCEDURE — G8427 DOCREV CUR MEDS BY ELIG CLIN: HCPCS | Performed by: NURSE PRACTITIONER

## 2019-07-10 PROCEDURE — 4040F PNEUMOC VAC/ADMIN/RCVD: CPT | Performed by: NURSE PRACTITIONER

## 2019-07-10 PROCEDURE — G8598 ASA/ANTIPLAT THER USED: HCPCS | Performed by: NURSE PRACTITIONER

## 2019-07-10 PROCEDURE — 1036F TOBACCO NON-USER: CPT | Performed by: NURSE PRACTITIONER

## 2019-07-10 PROCEDURE — 1123F ACP DISCUSS/DSCN MKR DOCD: CPT | Performed by: NURSE PRACTITIONER

## 2019-07-10 PROCEDURE — 99213 OFFICE O/P EST LOW 20 MIN: CPT | Performed by: NURSE PRACTITIONER

## 2019-07-10 PROCEDURE — G8399 PT W/DXA RESULTS DOCUMENT: HCPCS | Performed by: NURSE PRACTITIONER

## 2019-07-10 PROCEDURE — G8420 CALC BMI NORM PARAMETERS: HCPCS | Performed by: NURSE PRACTITIONER

## 2019-07-10 ASSESSMENT — ENCOUNTER SYMPTOMS
DIARRHEA: 0
COUGH: 0
SORE THROAT: 0
VOMITING: 0
FACIAL SWELLING: 0
BACK PAIN: 1
SINUS PRESSURE: 0
NAUSEA: 0

## 2019-07-10 ASSESSMENT — PATIENT HEALTH QUESTIONNAIRE - PHQ9
SUM OF ALL RESPONSES TO PHQ9 QUESTIONS 1 & 2: 0
SUM OF ALL RESPONSES TO PHQ QUESTIONS 1-9: 0
1. LITTLE INTEREST OR PLEASURE IN DOING THINGS: 0
2. FEELING DOWN, DEPRESSED OR HOPELESS: 0
SUM OF ALL RESPONSES TO PHQ QUESTIONS 1-9: 0

## 2019-07-10 NOTE — PATIENT INSTRUCTIONS
Ibuprofen 2pills every 8 hours as needed for pain  OR  Tylenol ES 2 pills every 6 hours as needed for pain (1st choice)    Heat and icy hot patches.

## 2019-07-16 ENCOUNTER — HOSPITAL ENCOUNTER (OUTPATIENT)
Age: 79
Discharge: HOME | End: 2019-07-16
Payer: MEDICARE

## 2019-07-16 DIAGNOSIS — I10 ESSENTIAL HYPERTENSION: ICD-10-CM

## 2019-07-16 LAB
A/G RATIO: 1.6 (ref 1.1–2.2)
ALBUMIN SERPL-MCNC: 4.6 G/DL (ref 3.4–5)
ALP BLD-CCNC: 77 U/L (ref 40–129)
ALT SERPL-CCNC: 18 U/L (ref 10–40)
ANION GAP SERPL CALCULATED.3IONS-SCNC: 12 MMOL/L (ref 3–16)
AST SERPL-CCNC: 29 U/L (ref 15–37)
BASOPHILS ABSOLUTE: 0 K/UL (ref 0–0.2)
BASOPHILS RELATIVE PERCENT: 0.6 %
BILIRUB SERPL-MCNC: 0.6 MG/DL (ref 0–1)
BUN BLDV-MCNC: 25 MG/DL (ref 7–20)
CALCIUM SERPL-MCNC: 10.2 MG/DL (ref 8.3–10.6)
CHLORIDE BLD-SCNC: 97 MMOL/L (ref 99–110)
CO2: 27 MMOL/L (ref 21–32)
CREAT SERPL-MCNC: 1 MG/DL (ref 0.6–1.2)
EOSINOPHILS ABSOLUTE: 0.2 K/UL (ref 0–0.6)
EOSINOPHILS RELATIVE PERCENT: 2.7 %
GFR AFRICAN AMERICAN: >60
GFR NON-AFRICAN AMERICAN: 53
GLOBULIN: 2.9 G/DL
GLUCOSE BLD-MCNC: 87 MG/DL (ref 70–99)
HCT VFR BLD CALC: 39.4 % (ref 36–48)
HEMOGLOBIN: 13.2 G/DL (ref 12–16)
LYMPHOCYTES ABSOLUTE: 1.6 K/UL (ref 1–5.1)
LYMPHOCYTES RELATIVE PERCENT: 28.6 %
MCH RBC QN AUTO: 32.2 PG (ref 26–34)
MCHC RBC AUTO-ENTMCNC: 33.4 G/DL (ref 31–36)
MCV RBC AUTO: 96.6 FL (ref 80–100)
MONOCYTES ABSOLUTE: 0.4 K/UL (ref 0–1.3)
MONOCYTES RELATIVE PERCENT: 8.1 %
NEUTROPHILS ABSOLUTE: 3.3 K/UL (ref 1.7–7.7)
NEUTROPHILS RELATIVE PERCENT: 60 %
PDW BLD-RTO: 13.2 % (ref 12.4–15.4)
PLATELET # BLD: 206 K/UL (ref 135–450)
PMV BLD AUTO: 7.5 FL (ref 5–10.5)
POTASSIUM SERPL-SCNC: 4.8 MMOL/L (ref 3.5–5.1)
RBC # BLD: 4.08 M/UL (ref 4–5.2)
SODIUM BLD-SCNC: 136 MMOL/L (ref 136–145)
TOTAL PROTEIN: 7.5 G/DL (ref 6.4–8.2)
WBC # BLD: 5.5 K/UL (ref 4–11)

## 2019-07-16 PROCEDURE — 85025 COMPLETE CBC W/AUTO DIFF WBC: CPT

## 2019-07-16 PROCEDURE — 80053 COMPREHEN METABOLIC PANEL: CPT

## 2019-07-16 PROCEDURE — 36415 COLL VENOUS BLD VENIPUNCTURE: CPT

## 2019-07-24 ENCOUNTER — TELEPHONE (OUTPATIENT)
Dept: INTERNAL MEDICINE CLINIC | Age: 79
End: 2019-07-24

## 2019-07-24 NOTE — TELEPHONE ENCOUNTER
Faxed to [facility] to Dr. Reta Lee of [facility] on patient.          Fax #    [phone]

## 2019-07-29 ENCOUNTER — OFFICE VISIT (OUTPATIENT)
Dept: INTERNAL MEDICINE CLINIC | Age: 79
End: 2019-07-29
Payer: MEDICARE

## 2019-07-29 VITALS
WEIGHT: 103.6 LBS | DIASTOLIC BLOOD PRESSURE: 84 MMHG | TEMPERATURE: 97.1 F | SYSTOLIC BLOOD PRESSURE: 138 MMHG | HEART RATE: 65 BPM | HEIGHT: 63 IN | OXYGEN SATURATION: 98 % | RESPIRATION RATE: 16 BRPM | BODY MASS INDEX: 18.36 KG/M2

## 2019-07-29 DIAGNOSIS — J47.9 BRONCHIECTASIS WITHOUT COMPLICATION (HCC): ICD-10-CM

## 2019-07-29 DIAGNOSIS — I25.84 CORONARY ARTERY CALCIFICATION: ICD-10-CM

## 2019-07-29 DIAGNOSIS — E78.5 DYSLIPIDEMIA: ICD-10-CM

## 2019-07-29 DIAGNOSIS — I77.9 CAROTID ARTERY DISEASE, UNSPECIFIED LATERALITY, UNSPECIFIED TYPE (HCC): ICD-10-CM

## 2019-07-29 DIAGNOSIS — M81.0 OSTEOPOROSIS, UNSPECIFIED OSTEOPOROSIS TYPE, UNSPECIFIED PATHOLOGICAL FRACTURE PRESENCE: ICD-10-CM

## 2019-07-29 DIAGNOSIS — I71.20 THORACIC AORTIC ANEURYSM WITHOUT RUPTURE: ICD-10-CM

## 2019-07-29 DIAGNOSIS — I25.10 CORONARY ARTERY CALCIFICATION: ICD-10-CM

## 2019-07-29 DIAGNOSIS — I10 ESSENTIAL HYPERTENSION: Primary | ICD-10-CM

## 2019-07-29 DIAGNOSIS — I77.1 CELIAC ARTERY STENOSIS (HCC): ICD-10-CM

## 2019-07-29 PROCEDURE — G8399 PT W/DXA RESULTS DOCUMENT: HCPCS | Performed by: NURSE PRACTITIONER

## 2019-07-29 PROCEDURE — G8598 ASA/ANTIPLAT THER USED: HCPCS | Performed by: NURSE PRACTITIONER

## 2019-07-29 PROCEDURE — G8419 CALC BMI OUT NRM PARAM NOF/U: HCPCS | Performed by: NURSE PRACTITIONER

## 2019-07-29 PROCEDURE — 1036F TOBACCO NON-USER: CPT | Performed by: NURSE PRACTITIONER

## 2019-07-29 PROCEDURE — 99214 OFFICE O/P EST MOD 30 MIN: CPT | Performed by: NURSE PRACTITIONER

## 2019-07-29 PROCEDURE — 4040F PNEUMOC VAC/ADMIN/RCVD: CPT | Performed by: NURSE PRACTITIONER

## 2019-07-29 PROCEDURE — G8427 DOCREV CUR MEDS BY ELIG CLIN: HCPCS | Performed by: NURSE PRACTITIONER

## 2019-07-29 PROCEDURE — 1123F ACP DISCUSS/DSCN MKR DOCD: CPT | Performed by: NURSE PRACTITIONER

## 2019-07-29 PROCEDURE — 1090F PRES/ABSN URINE INCON ASSESS: CPT | Performed by: NURSE PRACTITIONER

## 2019-07-29 RX ORDER — ACETAMINOPHEN 500 MG
500 TABLET ORAL EVERY 6 HOURS PRN
COMMUNITY

## 2019-07-29 RX ORDER — IBUPROFEN 200 MG
200 TABLET ORAL EVERY 6 HOURS PRN
COMMUNITY
End: 2020-08-03

## 2019-07-29 ASSESSMENT — PATIENT HEALTH QUESTIONNAIRE - PHQ9
SUM OF ALL RESPONSES TO PHQ QUESTIONS 1-9: 2
SUM OF ALL RESPONSES TO PHQ9 QUESTIONS 1 & 2: 2
2. FEELING DOWN, DEPRESSED OR HOPELESS: 1
SUM OF ALL RESPONSES TO PHQ QUESTIONS 1-9: 2
1. LITTLE INTEREST OR PLEASURE IN DOING THINGS: 1

## 2019-07-29 ASSESSMENT — ENCOUNTER SYMPTOMS
SINUS PRESSURE: 0
SORE THROAT: 0
COUGH: 0
FACIAL SWELLING: 0
DIARRHEA: 0
ABDOMINAL PAIN: 1
NAUSEA: 0
VOMITING: 0

## 2019-07-29 NOTE — PROGRESS NOTES
Subjective:      Patient ID: Pillo Johnson is a 78 y.o. female. HPI  Chief Complaint   Patient presents with    Hypertension     6 Month F/U     Thoracic aortic aneurysm - Had recent f/u with Dr. Sherman Fitch (Vascular with [facility]). Dr. 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 She states she is not interested in angiography with stenting so she only does f/u with Dr Yogesh Garza at this time however at her last OV with Yogesh Garza she stated consideration of intervention and therefore Yogesh Garza ordered CT scan. Coronary artery calcification - Denies concerns. Denies CP/SOB/SCOTT. Managed by Dr. Yogesh Garza     HTN. Metoprolol daily. No concerns. Does not check BP at home.      Hyperlipidemia. Zocor daily. Denies myalgias.      Osteoporosis.      Bronchiectasis. Managed by [facility].      Chronic abdominal pain but this has worsened over the last 12 months. She thinks it is d/t digestion and food intake so she works well on keeping good pattern with food intake. Feels RLQ \"lump\", after she eats she feels sick to her stomach and then states she has a bowel movement and this resolves or improves. Bowel movements are consistent. No blood in stool.      She is still going to [employer] often and has good support there. Prior to Visit Medications    Medication Sig Taking? Authorizing Provider   ibuprofen (ADVIL;MOTRIN) 200 MG tablet Take 200 mg by mouth every 6 hours as needed for Pain Yes Historical Provider, MD   acetaminophen (TYLENOL) 500 MG tablet Take 500 mg by mouth every 6 hours as needed for Pain Yes Historical Provider, MD   simvastatin (ZOCOR) 20 MG tablet TAKE ONE TABLET BY MOUTH DAILY Yes NED Manuel CNP   metoprolol succinate (TOPROL XL) 50 MG extended release tablet TAKE ONE TABLET BY MOUTH DAILY Yes NED Manuel CNP   Glucosamine-Chondroitin 750-600 MG CHEW Take  by mouth 2 times daily.  Yes Historical Provider, MD   magnesium (MAGNESIUM-OXIDE) 250 MG

## 2019-08-15 ENCOUNTER — OFFICE VISIT (OUTPATIENT)
Dept: INTERNAL MEDICINE CLINIC | Age: 79
End: 2019-08-15
Payer: MEDICARE

## 2019-08-15 VITALS
DIASTOLIC BLOOD PRESSURE: 70 MMHG | BODY MASS INDEX: 18.35 KG/M2 | OXYGEN SATURATION: 98 % | HEART RATE: 65 BPM | SYSTOLIC BLOOD PRESSURE: 130 MMHG | HEIGHT: 63 IN | TEMPERATURE: 97.5 F

## 2019-08-15 DIAGNOSIS — L85.3 DRY SKIN: ICD-10-CM

## 2019-08-15 DIAGNOSIS — N30.01 ACUTE CYSTITIS WITH HEMATURIA: Primary | ICD-10-CM

## 2019-08-15 LAB
BILIRUBIN, POC: ABNORMAL
BLOOD URINE, POC: ABNORMAL
CLARITY, POC: CLEAR
COLOR, POC: ABNORMAL
GLUCOSE URINE, POC: ABNORMAL
KETONES, POC: ABNORMAL
LEUKOCYTE EST, POC: + 3
NITRITE, POC: ABNORMAL
PH, POC: 5
PROTEIN, POC: + 50
SPECIFIC GRAVITY, POC: 1020
UROBILINOGEN, POC: ABNORMAL

## 2019-08-15 PROCEDURE — 1123F ACP DISCUSS/DSCN MKR DOCD: CPT | Performed by: NURSE PRACTITIONER

## 2019-08-15 PROCEDURE — G8427 DOCREV CUR MEDS BY ELIG CLIN: HCPCS | Performed by: NURSE PRACTITIONER

## 2019-08-15 PROCEDURE — 1036F TOBACCO NON-USER: CPT | Performed by: NURSE PRACTITIONER

## 2019-08-15 PROCEDURE — G8399 PT W/DXA RESULTS DOCUMENT: HCPCS | Performed by: NURSE PRACTITIONER

## 2019-08-15 PROCEDURE — 4040F PNEUMOC VAC/ADMIN/RCVD: CPT | Performed by: NURSE PRACTITIONER

## 2019-08-15 PROCEDURE — G8419 CALC BMI OUT NRM PARAM NOF/U: HCPCS | Performed by: NURSE PRACTITIONER

## 2019-08-15 PROCEDURE — G8598 ASA/ANTIPLAT THER USED: HCPCS | Performed by: NURSE PRACTITIONER

## 2019-08-15 PROCEDURE — 1090F PRES/ABSN URINE INCON ASSESS: CPT | Performed by: NURSE PRACTITIONER

## 2019-08-15 PROCEDURE — 81002 URINALYSIS NONAUTO W/O SCOPE: CPT | Performed by: NURSE PRACTITIONER

## 2019-08-15 PROCEDURE — 99214 OFFICE O/P EST MOD 30 MIN: CPT | Performed by: NURSE PRACTITIONER

## 2019-08-15 RX ORDER — NITROFURANTOIN 25; 75 MG/1; MG/1
100 CAPSULE ORAL 2 TIMES DAILY
Qty: 14 CAPSULE | Refills: 0 | Status: SHIPPED | OUTPATIENT
Start: 2019-08-15 | End: 2019-08-22

## 2019-08-15 ASSESSMENT — ENCOUNTER SYMPTOMS
VOMITING: 0
ROS SKIN COMMENTS: SEE HPI
EYE DISCHARGE: 0
CONSTIPATION: 0
NAUSEA: 0
SHORTNESS OF BREATH: 0
DIARRHEA: 0
ABDOMINAL PAIN: 0
WHEEZING: 0
BLOOD IN STOOL: 0
COUGH: 0

## 2019-08-15 NOTE — PROGRESS NOTES
 Hyperlipidemia     Hypertension     LBP (low back pain)     Lung nodules     Osteoporosis     Seasonal allergies     Stress bladder incontinence, female     Unspecified cerebral artery occlusion with cerebral infarction     Wears glasses        Review of Systems   Constitutional: Negative for fever. HENT: Negative for congestion. Eyes: Negative for discharge. Respiratory: Negative for cough, shortness of breath and wheezing. Cardiovascular: Negative for chest pain, palpitations and leg swelling. Gastrointestinal: Negative for abdominal pain, blood in stool, constipation, diarrhea, nausea and vomiting. Genitourinary: Positive for difficulty urinating, dysuria, frequency and hematuria. Musculoskeletal: Negative for myalgias. Skin: Negative for rash. See HPI   Neurological: Negative for dizziness. Psychiatric/Behavioral: The patient is not nervous/anxious. Physical Exam   Constitutional: She is oriented to person, place, and time. No distress. HENT:   Right Ear: External ear normal.   Left Ear: External ear normal.   Mouth/Throat: Oropharynx is clear and moist. No oropharyngeal exudate. Eyes: Right eye exhibits no discharge. Left eye exhibits no discharge. No scleral icterus. Neck: Normal range of motion. No thyromegaly present. Cardiovascular: Normal rate, regular rhythm, normal heart sounds and intact distal pulses. Exam reveals no gallop and no friction rub. No murmur heard. Pulmonary/Chest: Effort normal and breath sounds normal. She has no wheezes. Abdominal: Soft. Bowel sounds are normal. She exhibits no distension. There is no tenderness. Genitourinary:   Genitourinary Comments: UA with +50 protein, +4 blood and +3 leuk   Musculoskeletal: Normal range of motion. She exhibits no edema or tenderness. Lymphadenopathy:     She has no cervical adenopathy. Neurological: She is alert and oriented to person, place, and time. Skin: Skin is warm and dry.

## 2019-08-21 ENCOUNTER — TELEPHONE (OUTPATIENT)
Dept: INTERNAL MEDICINE CLINIC | Age: 79
End: 2019-08-21

## 2019-09-18 ENCOUNTER — HOSPITAL ENCOUNTER (OUTPATIENT)
Dept: WOMENS IMAGING | Age: 79
Discharge: HOME | End: 2019-09-18
Payer: MEDICARE

## 2019-09-18 DIAGNOSIS — M81.0 MENOPAUSAL OSTEOPOROSIS: ICD-10-CM

## 2019-09-18 PROCEDURE — 77080 DXA BONE DENSITY AXIAL: CPT

## 2019-10-28 ENCOUNTER — TELEPHONE (OUTPATIENT)
Dept: INTERNAL MEDICINE CLINIC | Age: 79
End: 2019-10-28

## 2019-10-28 ENCOUNTER — NURSE ONLY (OUTPATIENT)
Dept: INTERNAL MEDICINE CLINIC | Age: 79
End: 2019-10-28
Payer: MEDICARE

## 2019-10-28 DIAGNOSIS — R30.0 DYSURIA: ICD-10-CM

## 2019-10-28 DIAGNOSIS — R30.0 DYSURIA: Primary | ICD-10-CM

## 2019-10-28 LAB
BILIRUBIN, POC: NORMAL
BLOOD URINE, POC: NORMAL
CLARITY, POC: CLEAR
COLOR, POC: NORMAL
GLUCOSE URINE, POC: NORMAL
KETONES, POC: NORMAL
LEUKOCYTE EST, POC: NORMAL
NITRITE, POC: NORMAL
PH, POC: 7
PROTEIN, POC: NORMAL
SPECIFIC GRAVITY, POC: 1.02
UROBILINOGEN, POC: 0.2

## 2019-10-28 PROCEDURE — 81002 URINALYSIS NONAUTO W/O SCOPE: CPT | Performed by: NURSE PRACTITIONER

## 2019-10-28 RX ORDER — CIPROFLOXACIN 500 MG/1
500 TABLET, FILM COATED ORAL 2 TIMES DAILY
Qty: 6 TABLET | Refills: 0 | Status: SHIPPED | OUTPATIENT
Start: 2019-10-28 | End: 2019-10-31

## 2019-10-30 LAB — URINE CULTURE, ROUTINE: NORMAL

## 2019-11-17 ENCOUNTER — TELEPHONE (OUTPATIENT)
Dept: INTERNAL MEDICINE CLINIC | Age: 79
End: 2019-11-17

## 2019-11-21 ENCOUNTER — TELEPHONE (OUTPATIENT)
Dept: INTERNAL MEDICINE CLINIC | Age: 79
End: 2019-11-21

## 2020-01-01 ENCOUNTER — OFFICE VISIT (OUTPATIENT)
Dept: INTERNAL MEDICINE CLINIC | Age: 80
End: 2020-01-01
Payer: MEDICARE

## 2020-01-01 ENCOUNTER — CARE COORDINATION (OUTPATIENT)
Dept: CASE MANAGEMENT | Age: 80
End: 2020-01-01

## 2020-01-01 ENCOUNTER — NURSE TRIAGE (OUTPATIENT)
Dept: OTHER | Facility: CLINIC | Age: 80
End: 2020-01-01

## 2020-01-01 ENCOUNTER — TELEPHONE (OUTPATIENT)
Dept: INTERNAL MEDICINE CLINIC | Age: 80
End: 2020-01-01

## 2020-01-01 VITALS
DIASTOLIC BLOOD PRESSURE: 68 MMHG | BODY MASS INDEX: 17.54 KG/M2 | SYSTOLIC BLOOD PRESSURE: 134 MMHG | TEMPERATURE: 97.2 F | WEIGHT: 99 LBS | OXYGEN SATURATION: 96 % | HEART RATE: 89 BPM

## 2020-01-01 VITALS
WEIGHT: 98 LBS | DIASTOLIC BLOOD PRESSURE: 78 MMHG | TEMPERATURE: 97.3 F | OXYGEN SATURATION: 98 % | BODY MASS INDEX: 17.36 KG/M2 | SYSTOLIC BLOOD PRESSURE: 130 MMHG | HEART RATE: 55 BPM

## 2020-01-01 PROCEDURE — 4040F PNEUMOC VAC/ADMIN/RCVD: CPT | Performed by: NURSE PRACTITIONER

## 2020-01-01 PROCEDURE — G8484 FLU IMMUNIZE NO ADMIN: HCPCS | Performed by: NURSE PRACTITIONER

## 2020-01-01 PROCEDURE — 99213 OFFICE O/P EST LOW 20 MIN: CPT | Performed by: NURSE PRACTITIONER

## 2020-01-01 PROCEDURE — 1036F TOBACCO NON-USER: CPT | Performed by: NURSE PRACTITIONER

## 2020-01-01 PROCEDURE — G8399 PT W/DXA RESULTS DOCUMENT: HCPCS | Performed by: NURSE PRACTITIONER

## 2020-01-01 PROCEDURE — 1123F ACP DISCUSS/DSCN MKR DOCD: CPT | Performed by: NURSE PRACTITIONER

## 2020-01-01 PROCEDURE — 1090F PRES/ABSN URINE INCON ASSESS: CPT | Performed by: NURSE PRACTITIONER

## 2020-01-01 PROCEDURE — G8419 CALC BMI OUT NRM PARAM NOF/U: HCPCS | Performed by: NURSE PRACTITIONER

## 2020-01-01 PROCEDURE — G8427 DOCREV CUR MEDS BY ELIG CLIN: HCPCS | Performed by: NURSE PRACTITIONER

## 2020-01-01 RX ORDER — NITROFURANTOIN 25; 75 MG/1; MG/1
100 CAPSULE ORAL 2 TIMES DAILY
Qty: 14 CAPSULE | Refills: 0 | Status: SHIPPED | OUTPATIENT
Start: 2020-01-01 | End: 2020-01-01

## 2020-01-01 RX ORDER — GABAPENTIN 100 MG/1
CAPSULE ORAL
Qty: 180 CAPSULE | Refills: 0 | Status: SHIPPED | OUTPATIENT
Start: 2020-01-01 | End: 2021-01-01

## 2020-01-01 RX ORDER — GABAPENTIN 100 MG/1
CAPSULE ORAL
Qty: 180 CAPSULE | Refills: 0 | Status: SHIPPED | OUTPATIENT
Start: 2020-01-01 | End: 2020-01-01

## 2020-01-01 RX ORDER — PREDNISONE 20 MG/1
20 TABLET ORAL 2 TIMES DAILY
Qty: 10 TABLET | Refills: 0 | Status: SHIPPED | OUTPATIENT
Start: 2020-01-01 | End: 2020-01-01

## 2020-01-01 RX ORDER — VALACYCLOVIR HYDROCHLORIDE 1 G/1
1000 TABLET, FILM COATED ORAL 3 TIMES DAILY
Qty: 21 TABLET | Refills: 0 | Status: SHIPPED | OUTPATIENT
Start: 2020-01-01 | End: 2020-01-01

## 2020-01-01 RX ORDER — GABAPENTIN 100 MG/1
CAPSULE ORAL
Qty: 90 CAPSULE | Refills: 0 | Status: SHIPPED | OUTPATIENT
Start: 2020-01-01 | End: 2020-01-01 | Stop reason: SDUPTHER

## 2020-01-01 RX ORDER — VALACYCLOVIR HYDROCHLORIDE 1 G/1
1000 TABLET, FILM COATED ORAL 3 TIMES DAILY
Qty: 21 TABLET | Refills: 0 | Status: CANCELLED | OUTPATIENT
Start: 2020-01-01 | End: 2020-01-01

## 2020-01-01 RX ORDER — SIMVASTATIN 20 MG
TABLET ORAL
Qty: 90 TABLET | Refills: 2 | Status: SHIPPED | OUTPATIENT
Start: 2020-01-01 | End: 2021-01-01 | Stop reason: SDUPTHER

## 2020-01-01 ASSESSMENT — ENCOUNTER SYMPTOMS
COUGH: 0
NAUSEA: 0
SORE THROAT: 0
NAUSEA: 0
FACIAL SWELLING: 0
WHEEZING: 0
VOMITING: 0
CONSTIPATION: 0
SINUS PRESSURE: 0
EYE DISCHARGE: 0
COUGH: 0
DIARRHEA: 0
BLOOD IN STOOL: 0
ABDOMINAL PAIN: 0
SHORTNESS OF BREATH: 0
DIARRHEA: 0
VOMITING: 0

## 2020-01-07 ENCOUNTER — HOSPITAL ENCOUNTER (OUTPATIENT)
Age: 80
Discharge: HOME | End: 2020-01-07
Payer: MEDICARE

## 2020-01-07 LAB
A/G RATIO: 1.7 (ref 1.1–2.2)
ALBUMIN SERPL-MCNC: 4.5 G/DL (ref 3.4–5)
ALP BLD-CCNC: 73 U/L (ref 40–129)
ALT SERPL-CCNC: 13 U/L (ref 10–40)
ANION GAP SERPL CALCULATED.3IONS-SCNC: 17 MMOL/L (ref 3–16)
AST SERPL-CCNC: 25 U/L (ref 15–37)
BASOPHILS ABSOLUTE: 0 K/UL (ref 0–0.2)
BASOPHILS RELATIVE PERCENT: 0.7 %
BILIRUB SERPL-MCNC: 0.4 MG/DL (ref 0–1)
BUN BLDV-MCNC: 21 MG/DL (ref 7–20)
CALCIUM SERPL-MCNC: 10 MG/DL (ref 8.3–10.6)
CHLORIDE BLD-SCNC: 102 MMOL/L (ref 99–110)
CHOLESTEROL, TOTAL: 158 MG/DL (ref 0–199)
CO2: 23 MMOL/L (ref 21–32)
CREAT SERPL-MCNC: 0.8 MG/DL (ref 0.6–1.2)
EOSINOPHILS ABSOLUTE: 0.1 K/UL (ref 0–0.6)
EOSINOPHILS RELATIVE PERCENT: 3.1 %
GFR AFRICAN AMERICAN: >60
GFR NON-AFRICAN AMERICAN: >60
GLOBULIN: 2.7 G/DL
GLUCOSE BLD-MCNC: 77 MG/DL (ref 70–99)
HCT VFR BLD CALC: 37.4 % (ref 36–48)
HDLC SERPL-MCNC: 75 MG/DL (ref 40–60)
HEMOGLOBIN: 12.7 G/DL (ref 12–16)
LDL CHOLESTEROL CALCULATED: 61 MG/DL
LYMPHOCYTES ABSOLUTE: 1.3 K/UL (ref 1–5.1)
LYMPHOCYTES RELATIVE PERCENT: 30.8 %
MCH RBC QN AUTO: 32.9 PG (ref 26–34)
MCHC RBC AUTO-ENTMCNC: 34 G/DL (ref 31–36)
MCV RBC AUTO: 96.5 FL (ref 80–100)
MONOCYTES ABSOLUTE: 0.4 K/UL (ref 0–1.3)
MONOCYTES RELATIVE PERCENT: 9.9 %
NEUTROPHILS ABSOLUTE: 2.4 K/UL (ref 1.7–7.7)
NEUTROPHILS RELATIVE PERCENT: 55.5 %
PDW BLD-RTO: 13.4 % (ref 12.4–15.4)
PLATELET # BLD: 182 K/UL (ref 135–450)
PMV BLD AUTO: 7.6 FL (ref 5–10.5)
POTASSIUM SERPL-SCNC: 4.2 MMOL/L (ref 3.5–5.1)
RBC # BLD: 3.87 M/UL (ref 4–5.2)
SODIUM BLD-SCNC: 142 MMOL/L (ref 136–145)
TOTAL PROTEIN: 7.2 G/DL (ref 6.4–8.2)
TRIGL SERPL-MCNC: 109 MG/DL (ref 0–150)
VLDLC SERPL CALC-MCNC: 22 MG/DL
WBC # BLD: 4.3 K/UL (ref 4–11)

## 2020-01-07 PROCEDURE — 36415 COLL VENOUS BLD VENIPUNCTURE: CPT

## 2020-01-07 PROCEDURE — 85025 COMPLETE CBC W/AUTO DIFF WBC: CPT

## 2020-01-07 PROCEDURE — 80053 COMPREHEN METABOLIC PANEL: CPT

## 2020-01-07 PROCEDURE — 80061 LIPID PANEL: CPT

## 2020-01-29 ENCOUNTER — OFFICE VISIT (OUTPATIENT)
Dept: INTERNAL MEDICINE CLINIC | Age: 80
End: 2020-01-29
Payer: MEDICARE

## 2020-01-29 VITALS
BODY MASS INDEX: 18.25 KG/M2 | HEART RATE: 71 BPM | DIASTOLIC BLOOD PRESSURE: 72 MMHG | WEIGHT: 103 LBS | SYSTOLIC BLOOD PRESSURE: 140 MMHG | OXYGEN SATURATION: 97 %

## 2020-01-29 PROCEDURE — G8484 FLU IMMUNIZE NO ADMIN: HCPCS | Performed by: NURSE PRACTITIONER

## 2020-01-29 PROCEDURE — 1123F ACP DISCUSS/DSCN MKR DOCD: CPT | Performed by: NURSE PRACTITIONER

## 2020-01-29 PROCEDURE — 99213 OFFICE O/P EST LOW 20 MIN: CPT | Performed by: NURSE PRACTITIONER

## 2020-01-29 PROCEDURE — 4040F PNEUMOC VAC/ADMIN/RCVD: CPT | Performed by: NURSE PRACTITIONER

## 2020-01-29 PROCEDURE — 1036F TOBACCO NON-USER: CPT | Performed by: NURSE PRACTITIONER

## 2020-01-29 PROCEDURE — G8399 PT W/DXA RESULTS DOCUMENT: HCPCS | Performed by: NURSE PRACTITIONER

## 2020-01-29 PROCEDURE — G8427 DOCREV CUR MEDS BY ELIG CLIN: HCPCS | Performed by: NURSE PRACTITIONER

## 2020-01-29 PROCEDURE — 1090F PRES/ABSN URINE INCON ASSESS: CPT | Performed by: NURSE PRACTITIONER

## 2020-01-29 PROCEDURE — G8419 CALC BMI OUT NRM PARAM NOF/U: HCPCS | Performed by: NURSE PRACTITIONER

## 2020-01-29 ASSESSMENT — ENCOUNTER SYMPTOMS
SORE THROAT: 0
NAUSEA: 0
DIARRHEA: 0
FACIAL SWELLING: 0
VOMITING: 0
COUGH: 0
SINUS PRESSURE: 0

## 2020-01-29 ASSESSMENT — PATIENT HEALTH QUESTIONNAIRE - PHQ9
SUM OF ALL RESPONSES TO PHQ QUESTIONS 1-9: 2
SUM OF ALL RESPONSES TO PHQ QUESTIONS 1-9: 2
SUM OF ALL RESPONSES TO PHQ9 QUESTIONS 1 & 2: 2
2. FEELING DOWN, DEPRESSED OR HOPELESS: 2
1. LITTLE INTEREST OR PLEASURE IN DOING THINGS: 0

## 2020-01-29 NOTE — PROGRESS NOTES
Marita Hairston  1940      HPI:  Chief Complaint   Patient presents with    Hypertension     Thoracic aortic aneurysm - Dr. Tamia Martin (Vascular with [facility]). Dr. 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 She states she is not interested in angiography with stenting so she only does f/u with Dr Dolly Pastor at this time however at her last OV with Dolly Pastor she stated consideration of intervention and therefore Dolly Pastor ordered CT scan which they will monitor every 3-4 months.       Coronary artery calcification - Denies concerns. Denies CP/SOB/SCOTT. Managed by Dr. Dolly Pastor     HTN. Metoprolol daily. No concerns. Does not check BP at home.      Hyperlipidemia. Zocor daily. Denies myalgias.      Osteoporosis. Has been on Fosamax for many many years per patient.      Bronchiectasis. Managed by Sheila Hooks - was going to perform cystoscopy but pt refused as it was going to be more extensive than she thought. + Hematuria. She is still going to [employer] often and has good support there.     BP (!) 140/72 (Site: Right Upper Arm, Position: Sitting, Cuff Size: Large Adult)   Pulse 71   Wt 103 lb (46.7 kg)   LMP  (Approximate)   SpO2 97%   BMI 18.25 kg/m²     Prior to Visit Medications    Medication Sig Taking? Authorizing Provider   metoprolol succinate (TOPROL XL) 50 MG extended release tablet TAKE ONE TABLET BY MOUTH DAILY Yes NED Manuel - CNP   simvastatin (ZOCOR) 20 MG tablet TAKE ONE TABLET BY MOUTH DAILY Yes NED Manuel - CNP   ibuprofen (ADVIL;MOTRIN) 200 MG tablet Take 200 mg by mouth every 6 hours as needed for Pain Yes Historical Provider, MD   acetaminophen (TYLENOL) 500 MG tablet Take 500 mg by mouth every 6 hours as needed for Pain Yes Historical Provider, MD   Glucosamine-Chondroitin 750-600 MG CHEW Take  by mouth 2 times daily. Yes Historical Provider, MD   magnesium (MAGNESIUM-OXIDE) 250 MG TABS tablet Take 250 mg by mouth daily. Not on file   Relationships    Social connections:     Talks on phone: Not on file     Gets together: Not on file     Attends [employer] service: Not on file     Active member of club or organization: Not on file     Attends meetings of clubs or organizations: Not on file     Relationship status: Not on file    Intimate partner violence:     Fear of current or ex partner: Not on file     Emotionally abused: Not on file     Physically abused: Not on file     Forced sexual activity: Not on file   Other Topics Concern    Not on file   Social History Narrative    Not on file       Review of Systems   Constitutional: Negative for appetite change, chills, fatigue, fever and unexpected weight change. HENT: Negative for congestion, ear discharge, ear pain, facial swelling, hearing loss, sinus pressure, sneezing and sore throat. Respiratory: Negative for cough. Cardiovascular: Negative for chest pain. Gastrointestinal: Negative for diarrhea, nausea and vomiting. Genitourinary: Negative for difficulty urinating, dysuria, hematuria and urgency. Musculoskeletal: Negative for arthralgias and gait problem. Neurological: Negative for dizziness, weakness and headaches. Hematological: Negative for adenopathy. Psychiatric/Behavioral: Negative for sleep disturbance and suicidal ideas. Physical Exam  Constitutional:       Appearance: She is normal weight. HENT:      Head: Normocephalic. Cardiovascular:      Rate and Rhythm: Normal rate and regular rhythm. Pulses: Normal pulses. Heart sounds: Murmur present. Pulmonary:      Effort: Pulmonary effort is normal.      Breath sounds: Normal breath sounds. Abdominal:      General: Bowel sounds are normal.      Tenderness: There is no abdominal tenderness. Musculoskeletal:      Right lower leg: No edema. Left lower leg: No edema. Neurological:      General: No focal deficit present.       Mental Status: She is alert and oriented to person, place, and time. Psychiatric:         Mood and Affect: Mood normal.         Thought Content: Thought content normal.         Judgment: Judgment normal.         Assessment:     1. Essential hypertension  Monitor, cont medication, check BP at home daily, keep log    2. Mixed hyperlipidemia  Monitor, cont medication    3. Carotid artery disease, unspecified laterality, unspecified type ([facility] [address]  F/u Dr Saulo frias, f/u CT 2/20    4. Bronchiectasis without complication ([facility] [address]  F/u Dr Naomie White, no further changes at this time    5. Thoracic aortic aneurysm without rupture [facility])  F/u Dr Saulo frias, f/u CT 2/20    6. Coronary artery calcification  Monitor, see above notes    7. Osteoporosis, unspecified osteoporosis type, unspecified pathological fracture presence  Monitor. Was on Fosamax for \"many, many years\" per patient. Plan:    See above plan. Return in about 6 months (around 7/29/2020) for 30 min appt, HTN, HLD, bronchiectasis.     Patrick Koehler, APRN - CNP

## 2020-02-26 ENCOUNTER — OFFICE VISIT (OUTPATIENT)
Dept: INTERNAL MEDICINE CLINIC | Age: 80
End: 2020-02-26
Payer: MEDICARE

## 2020-02-26 VITALS
OXYGEN SATURATION: 96 % | WEIGHT: 102 LBS | BODY MASS INDEX: 18.07 KG/M2 | DIASTOLIC BLOOD PRESSURE: 68 MMHG | HEART RATE: 80 BPM | SYSTOLIC BLOOD PRESSURE: 134 MMHG

## 2020-02-26 LAB
BILIRUBIN, POC: ABNORMAL
BLOOD URINE, POC: ABNORMAL
CLARITY, POC: CLEAR
COLOR, POC: YELLOW
GLUCOSE URINE, POC: ABNORMAL
KETONES, POC: ABNORMAL
LEUKOCYTE EST, POC: ABNORMAL
NITRITE, POC: ABNORMAL
PH, POC: 6
PROTEIN, POC: ABNORMAL
SPECIFIC GRAVITY, POC: 1.03
UROBILINOGEN, POC: 0.2

## 2020-02-26 PROCEDURE — G8419 CALC BMI OUT NRM PARAM NOF/U: HCPCS | Performed by: NURSE PRACTITIONER

## 2020-02-26 PROCEDURE — G8484 FLU IMMUNIZE NO ADMIN: HCPCS | Performed by: NURSE PRACTITIONER

## 2020-02-26 PROCEDURE — 1090F PRES/ABSN URINE INCON ASSESS: CPT | Performed by: NURSE PRACTITIONER

## 2020-02-26 PROCEDURE — 1036F TOBACCO NON-USER: CPT | Performed by: NURSE PRACTITIONER

## 2020-02-26 PROCEDURE — 99213 OFFICE O/P EST LOW 20 MIN: CPT | Performed by: NURSE PRACTITIONER

## 2020-02-26 PROCEDURE — 4040F PNEUMOC VAC/ADMIN/RCVD: CPT | Performed by: NURSE PRACTITIONER

## 2020-02-26 PROCEDURE — 81002 URINALYSIS NONAUTO W/O SCOPE: CPT | Performed by: NURSE PRACTITIONER

## 2020-02-26 PROCEDURE — G8399 PT W/DXA RESULTS DOCUMENT: HCPCS | Performed by: NURSE PRACTITIONER

## 2020-02-26 PROCEDURE — 1123F ACP DISCUSS/DSCN MKR DOCD: CPT | Performed by: NURSE PRACTITIONER

## 2020-02-26 PROCEDURE — G8427 DOCREV CUR MEDS BY ELIG CLIN: HCPCS | Performed by: NURSE PRACTITIONER

## 2020-02-26 RX ORDER — NITROFURANTOIN 25; 75 MG/1; MG/1
100 CAPSULE ORAL 2 TIMES DAILY
Qty: 14 CAPSULE | Refills: 0 | Status: SHIPPED | OUTPATIENT
Start: 2020-02-26 | End: 2020-03-04

## 2020-02-26 ASSESSMENT — ENCOUNTER SYMPTOMS
NAUSEA: 0
VOMITING: 0
COUGH: 0
SHORTNESS OF BREATH: 0
EYE DISCHARGE: 0
DIARRHEA: 0
ABDOMINAL PAIN: 0
CONSTIPATION: 0
WHEEZING: 0
BLOOD IN STOOL: 0

## 2020-02-26 NOTE — PROGRESS NOTES
02/26/20    [facility]  78 y.o.  female    Chief Complaint   Patient presents with    Hematuria         HPI:   Pt noted blood in urine this morning, preceded by some abdominal pain. She did have contrast the previous week for CT scan ordered by urology. I previously referred her to urology for repeated UTIs with hematuria. A CT scan was done and a cystoscopy was planned. She misunderstood what the cystoscopy was and cancelled it. There were no kidney stones seen in imaging. Pt is agreeable now to reschedule cystoscopy for further evaluation. HTN:  BP elevated upon arrival but better at recheck. /68   Pulse 80   Wt 102 lb (46.3 kg)   LMP  (Approximate)   SpO2 96%   BMI 18.07 kg/m²        Current Outpatient Medications   Medication Sig Dispense Refill    nitrofurantoin, macrocrystal-monohydrate, (MACROBID) 100 MG capsule Take 1 capsule by mouth 2 times daily for 7 days 14 capsule 0    metoprolol succinate (TOPROL XL) 50 MG extended release tablet TAKE ONE TABLET BY MOUTH DAILY 90 tablet 3    simvastatin (ZOCOR) 20 MG tablet TAKE ONE TABLET BY MOUTH DAILY 90 tablet 3    ibuprofen (ADVIL;MOTRIN) 200 MG tablet Take 200 mg by mouth every 6 hours as needed for Pain      acetaminophen (TYLENOL) 500 MG tablet Take 500 mg by mouth every 6 hours as needed for Pain      Glucosamine-Chondroitin 750-600 MG CHEW Take  by mouth 2 times daily.  magnesium (MAGNESIUM-OXIDE) 250 MG TABS tablet Take 250 mg by mouth daily.  Coenzyme Q10 (COQ10) 50 MG CAPS Take  by mouth 2 times daily.  Flaxseed, Linseed, (FLAX SEED OIL) 1000 MG CAPS Take 1,000 mg by mouth daily.  Vitamin D 3 (CHOLECALCIFEROL) 1000 UNITS TABS tablet Take 1,000 Units by mouth daily.  aspirin 325 MG tablet Take 325 mg by mouth daily.  fish oil-omega-3 fatty acids 1000 MG capsule Take 1 g by mouth daily.  therapeutic multivitamin-minerals (THERAGRAN-M) tablet Take 1 tablet by mouth daily.         of motion. General: No tenderness. Lymphadenopathy:      Cervical: No cervical adenopathy. Skin:     General: Skin is warm and dry. Findings: No erythema or rash. Neurological:      Mental Status: She is alert and oriented to person, place, and time. Psychiatric:         Judgment: Judgment normal.        1. Acute cystitis with hematuria  Hydrate well  - nitrofurantoin, macrocrystal-monohydrate, (MACROBID) 100 MG capsule; Take 1 capsule by mouth 2 times daily for 7 days  Dispense: 14 capsule;  Refill: 0   FU with urology with cystoscopy for additional evaluation    Laurita Lua, APRN - CNP

## 2020-03-25 ENCOUNTER — TELEPHONE (OUTPATIENT)
Dept: INTERNAL MEDICINE CLINIC | Age: 80
End: 2020-03-25

## 2020-05-18 ENCOUNTER — TELEPHONE (OUTPATIENT)
Dept: INTERNAL MEDICINE CLINIC | Age: 80
End: 2020-05-18

## 2020-05-18 NOTE — TELEPHONE ENCOUNTER
Patient is calling, she has lump near vagina that is painful. Seems to get better after a bowel movement. She thinks it has something to do with her digestive system. Has been ongoing x 4 months. Please advise.      [phone]-[id number]

## 2020-05-21 ENCOUNTER — VIRTUAL VISIT (OUTPATIENT)
Dept: INTERNAL MEDICINE CLINIC | Age: 80
End: 2020-05-21
Payer: MEDICARE

## 2020-05-21 PROCEDURE — 99213 OFFICE O/P EST LOW 20 MIN: CPT | Performed by: NURSE PRACTITIONER

## 2020-05-21 RX ORDER — TIZANIDINE 2 MG/1
2 TABLET ORAL NIGHTLY PRN
Qty: 14 TABLET | Refills: 0 | Status: SHIPPED | OUTPATIENT
Start: 2020-05-21 | End: 2020-08-03

## 2020-05-21 RX ORDER — DULOXETIN HYDROCHLORIDE 20 MG/1
CAPSULE, DELAYED RELEASE ORAL
Status: ON HOLD | COMMUNITY
Start: 2020-03-19 | End: 2020-07-08 | Stop reason: HOSPADM

## 2020-05-21 RX ORDER — MELOXICAM 7.5 MG/1
7.5 TABLET ORAL DAILY PRN
Qty: 14 TABLET | Refills: 0 | Status: ON HOLD
Start: 2020-05-21 | End: 2020-07-08 | Stop reason: HOSPADM

## 2020-05-21 ASSESSMENT — ENCOUNTER SYMPTOMS
NAUSEA: 0
BACK PAIN: 1
DIARRHEA: 0
SHORTNESS OF BREATH: 0
ABDOMINAL DISTENTION: 0
CONSTIPATION: 0
VOMITING: 0
CHEST TIGHTNESS: 0

## 2020-05-21 NOTE — PROGRESS NOTES
2020    TELEHEALTH EVALUATION -- Audio/Visual (During SZPQW-30 public health emergency)    HPI:    Hip Pain    The incident occurred more than 1 week ago (occurring for 4 months now). The incident occurred at home. There was no injury mechanism. The pain is present in the left leg and left thigh. The quality of the pain is described as aching, shooting and cramping. The pain is at a severity of 4/10. The pain is moderate. The pain has been intermittent since onset. Pertinent negatives include no inability to bear weight, loss of motion or muscle weakness. Associated symptoms comments: Muscle spasms. She reports no foreign bodies present. Nothing aggravates the symptoms. She has tried rest for the symptoms. The treatment provided no relief. Claudette Fryer (:  1940) has requested an audio/video evaluation for the following concern(s):    Review of Systems   Constitutional: Negative for activity change, fatigue and unexpected weight change. Respiratory: Negative for chest tightness and shortness of breath. Cardiovascular: Negative for chest pain, palpitations and leg swelling. Gastrointestinal: Negative for abdominal distention, constipation, diarrhea, nausea and vomiting. Genitourinary: Negative for difficulty urinating and urgency. Musculoskeletal: Positive for arthralgias, back pain and myalgias. Spasming lateral leg pain radiating from left heel to thigh/low back   Skin: Negative for rash. Neurological: Negative for dizziness, weakness and light-headedness. Prior to Visit Medications    Medication Sig Taking?  Authorizing Provider   DULoxetine (CYMBALTA) 20 MG extended release capsule  Yes Historical Provider, MD   tiZANidine (ZANAFLEX) 2 MG tablet Take 1 tablet by mouth nightly as needed (back pain/muscle spasm) Yes NED Navas CNP   meloxicam (MOBIC) 7.5 MG tablet Take 1 tablet by mouth daily as needed for Pain Yes NED Navas CNP   metoprolol succinate Medical History:   Diagnosis Date    CAD (coronary artery disease)     Cancer ([facility] [address] 2016    skin cancer    Carotid artery disease ([facility] [address]     HTN (hypertension)     Hyperlipemia     Hyperlipidemia     LBP (low back pain)     Lung nodules     Osteoporosis     Seasonal allergies     Stress bladder incontinence, female     Unspecified cerebral artery occlusion with cerebral infarction     Wears glasses    ,   Past Surgical History:   Procedure Laterality Date    CARDIAC CATHETERIZATION      COLONOSCOPY  8/13/2014    VARICOSE VEIN SURGERY     ,   Social History     Tobacco Use    Smoking status: Never Smoker    Smokeless tobacco: Never Used   Substance Use Topics    Alcohol use: No    Drug use: No   ,   Family History   Problem Relation Age of Onset    Heart Disease Sister     Stroke Mother    ,   Immunization History   Administered Date(s) Administered    Influenza 10/29/2012    Influenza Virus Vaccine 10/29/2003, 10/05/2005, 11/05/2007, 11/03/2008, 10/27/2010, 10/27/2010, 10/18/2011, 12/13/2011    Influenza, High Dose (Fluzone 65 yrs and older) 10/29/2014, 11/02/2015, 11/17/2017    Pneumococcal Conjugate 13-valent (Scryadj12) 05/10/2016    Pneumococcal Polysaccharide ([id number]) 11/11/2002    Zoster Live (Zostavax) 02/22/2010   ,   Health Maintenance   Topic Date Due    DTaP/Tdap/Td vaccine (1 - Tdap) 05[birth year]04/1957    Shingles Vaccine (2 of 3) 04/19/2010    Annual Wellness Visit (AWV)  05/29/2019    Lipid screen  01/07/2021    Potassium monitoring  01/07/2021    Creatinine monitoring  01/07/2021    DEXA (modify frequency per FRAX score)  Completed    Flu vaccine  Completed    Hepatitis A vaccine  Aged Out    Hepatitis B vaccine  Aged Out    Hib vaccine  Aged Out    Meningococcal (ACWY) vaccine  Aged Out     ASSESSMENT/PLAN:  1. Muscle spasm of left lower extremity    - tiZANidine (ZANAFLEX) 2 MG tablet;  Take 1 tablet by mouth nightly as needed (back pain/muscle spasm)  Dispense: 14

## 2020-07-07 ENCOUNTER — HOSPITAL ENCOUNTER (INPATIENT)
Age: 80
LOS: 1 days | Discharge: HOME | DRG: 309 | End: 2020-07-08
Attending: EMERGENCY MEDICINE | Admitting: INTERNAL MEDICINE
Payer: MEDICARE

## 2020-07-07 ENCOUNTER — APPOINTMENT (OUTPATIENT)
Dept: GENERAL RADIOLOGY | Age: 80
DRG: 309 | End: 2020-07-07
Payer: MEDICARE

## 2020-07-07 PROBLEM — I48.0 PAROXYSMAL ATRIAL FIBRILLATION WITH RAPID VENTRICULAR RESPONSE (HCC): Status: ACTIVE | Noted: 2020-07-07

## 2020-07-07 PROBLEM — I48.91 ATRIAL FIBRILLATION WITH RVR (HCC): Status: ACTIVE | Noted: 2020-07-07

## 2020-07-07 LAB
A/G RATIO: 1.4 (ref 1.1–2.2)
ALBUMIN SERPL-MCNC: 4.3 G/DL (ref 3.4–5)
ALP BLD-CCNC: 75 U/L (ref 40–129)
ALT SERPL-CCNC: 14 U/L (ref 10–40)
ANION GAP SERPL CALCULATED.3IONS-SCNC: 11 MMOL/L (ref 3–16)
AST SERPL-CCNC: 24 U/L (ref 15–37)
BACTERIA: ABNORMAL /HPF
BASOPHILS ABSOLUTE: 0 K/UL (ref 0–0.2)
BASOPHILS RELATIVE PERCENT: 0.6 %
BILIRUB SERPL-MCNC: 0.3 MG/DL (ref 0–1)
BILIRUBIN URINE: NEGATIVE
BLOOD, URINE: ABNORMAL
BUN BLDV-MCNC: 31 MG/DL (ref 7–20)
CALCIUM SERPL-MCNC: 10.4 MG/DL (ref 8.3–10.6)
CHLORIDE BLD-SCNC: 99 MMOL/L (ref 99–110)
CLARITY: ABNORMAL
CO2: 29 MMOL/L (ref 21–32)
COLOR: YELLOW
CREAT SERPL-MCNC: 1.1 MG/DL (ref 0.6–1.2)
EKG ATRIAL RATE: 120 BPM
EKG ATRIAL RATE: 72 BPM
EKG DIAGNOSIS: NORMAL
EKG DIAGNOSIS: NORMAL
EKG P AXIS: 29 DEGREES
EKG P-R INTERVAL: 184 MS
EKG Q-T INTERVAL: 326 MS
EKG Q-T INTERVAL: 420 MS
EKG QRS DURATION: 132 MS
EKG QRS DURATION: 138 MS
EKG QTC CALCULATION (BAZETT): 459 MS
EKG QTC CALCULATION (BAZETT): 501 MS
EKG R AXIS: -67 DEGREES
EKG R AXIS: -85 DEGREES
EKG T AXIS: 74 DEGREES
EKG T AXIS: 83 DEGREES
EKG VENTRICULAR RATE: 142 BPM
EKG VENTRICULAR RATE: 72 BPM
EOSINOPHILS ABSOLUTE: 0.1 K/UL (ref 0–0.6)
EOSINOPHILS RELATIVE PERCENT: 1.4 %
EPITHELIAL CELLS, UA: ABNORMAL /HPF (ref 0–5)
GFR AFRICAN AMERICAN: 58
GFR NON-AFRICAN AMERICAN: 48
GLOBULIN: 3 G/DL
GLUCOSE BLD-MCNC: 107 MG/DL (ref 70–99)
GLUCOSE URINE: NEGATIVE MG/DL
HCT VFR BLD CALC: 39.8 % (ref 36–48)
HEMOGLOBIN: 13.4 G/DL (ref 12–16)
KETONES, URINE: NEGATIVE MG/DL
LEUKOCYTE ESTERASE, URINE: ABNORMAL
LIPASE: 57 U/L (ref 13–60)
LYMPHOCYTES ABSOLUTE: 2.3 K/UL (ref 1–5.1)
LYMPHOCYTES RELATIVE PERCENT: 31.7 %
MAGNESIUM: 2.1 MG/DL (ref 1.8–2.4)
MCH RBC QN AUTO: 31.8 PG (ref 26–34)
MCHC RBC AUTO-ENTMCNC: 33.5 G/DL (ref 31–36)
MCV RBC AUTO: 94.8 FL (ref 80–100)
MICROSCOPIC EXAMINATION: YES
MONOCYTES ABSOLUTE: 0.8 K/UL (ref 0–1.3)
MONOCYTES RELATIVE PERCENT: 10.9 %
NEUTROPHILS ABSOLUTE: 4 K/UL (ref 1.7–7.7)
NEUTROPHILS RELATIVE PERCENT: 55.4 %
NITRITE, URINE: POSITIVE
PDW BLD-RTO: 14.3 % (ref 12.4–15.4)
PH UA: 6 (ref 5–8)
PLATELET # BLD: 249 K/UL (ref 135–450)
PMV BLD AUTO: 7.4 FL (ref 5–10.5)
POTASSIUM SERPL-SCNC: 4.4 MMOL/L (ref 3.5–5.1)
PRO-BNP: 5447 PG/ML (ref 0–449)
PROTEIN UA: NEGATIVE MG/DL
RBC # BLD: 4.21 M/UL (ref 4–5.2)
RBC UA: ABNORMAL /HPF (ref 0–4)
SODIUM BLD-SCNC: 139 MMOL/L (ref 136–145)
SPECIFIC GRAVITY UA: 1.02 (ref 1–1.03)
T4 FREE: 1.3 NG/DL (ref 0.9–1.8)
TOTAL PROTEIN: 7.3 G/DL (ref 6.4–8.2)
TROPONIN: 0.02 NG/ML
TSH REFLEX: 4.4 UIU/ML (ref 0.27–4.2)
URINE TYPE: ABNORMAL
UROBILINOGEN, URINE: 0.2 E.U./DL
WBC # BLD: 7.2 K/UL (ref 4–11)
WBC UA: >100 /HPF (ref 0–5)

## 2020-07-07 PROCEDURE — 2060000000 HC ICU INTERMEDIATE R&B

## 2020-07-07 PROCEDURE — 93010 ELECTROCARDIOGRAM REPORT: CPT | Performed by: INTERNAL MEDICINE

## 2020-07-07 PROCEDURE — 96361 HYDRATE IV INFUSION ADD-ON: CPT

## 2020-07-07 PROCEDURE — 2580000003 HC RX 258: Performed by: INTERNAL MEDICINE

## 2020-07-07 PROCEDURE — 84484 ASSAY OF TROPONIN QUANT: CPT

## 2020-07-07 PROCEDURE — 99285 EMERGENCY DEPT VISIT HI MDM: CPT

## 2020-07-07 PROCEDURE — 81001 URINALYSIS AUTO W/SCOPE: CPT

## 2020-07-07 PROCEDURE — 99223 1ST HOSP IP/OBS HIGH 75: CPT | Performed by: INTERNAL MEDICINE

## 2020-07-07 PROCEDURE — 2500000003 HC RX 250 WO HCPCS: Performed by: PHYSICIAN ASSISTANT

## 2020-07-07 PROCEDURE — 93005 ELECTROCARDIOGRAM TRACING: CPT | Performed by: EMERGENCY MEDICINE

## 2020-07-07 PROCEDURE — 83880 ASSAY OF NATRIURETIC PEPTIDE: CPT

## 2020-07-07 PROCEDURE — 93005 ELECTROCARDIOGRAM TRACING: CPT | Performed by: INTERNAL MEDICINE

## 2020-07-07 PROCEDURE — 84443 ASSAY THYROID STIM HORMONE: CPT

## 2020-07-07 PROCEDURE — 84439 ASSAY OF FREE THYROXINE: CPT

## 2020-07-07 PROCEDURE — 6370000000 HC RX 637 (ALT 250 FOR IP): Performed by: INTERNAL MEDICINE

## 2020-07-07 PROCEDURE — 71045 X-RAY EXAM CHEST 1 VIEW: CPT

## 2020-07-07 PROCEDURE — 83690 ASSAY OF LIPASE: CPT

## 2020-07-07 PROCEDURE — 2580000003 HC RX 258: Performed by: PHYSICIAN ASSISTANT

## 2020-07-07 PROCEDURE — 96374 THER/PROPH/DIAG INJ IV PUSH: CPT

## 2020-07-07 PROCEDURE — 83735 ASSAY OF MAGNESIUM: CPT

## 2020-07-07 PROCEDURE — 80053 COMPREHEN METABOLIC PANEL: CPT

## 2020-07-07 PROCEDURE — 85025 COMPLETE CBC W/AUTO DIFF WBC: CPT

## 2020-07-07 RX ORDER — POLYETHYLENE GLYCOL 3350 17 G/17G
17 POWDER, FOR SOLUTION ORAL DAILY PRN
Status: DISCONTINUED | OUTPATIENT
Start: 2020-07-07 | End: 2020-07-08 | Stop reason: HOSPADM

## 2020-07-07 RX ORDER — VITAMIN B COMPLEX
1000 TABLET ORAL DAILY
Status: DISCONTINUED | OUTPATIENT
Start: 2020-07-07 | End: 2020-07-08 | Stop reason: HOSPADM

## 2020-07-07 RX ORDER — FOLIC ACID 1 MG/1
400 TABLET ORAL DAILY
Status: DISCONTINUED | OUTPATIENT
Start: 2020-07-07 | End: 2020-07-08 | Stop reason: HOSPADM

## 2020-07-07 RX ORDER — VITAMIN E 268 MG
400 CAPSULE ORAL DAILY
Status: DISCONTINUED | OUTPATIENT
Start: 2020-07-07 | End: 2020-07-08 | Stop reason: HOSPADM

## 2020-07-07 RX ORDER — ACETAMINOPHEN 500 MG
500 TABLET ORAL EVERY 6 HOURS PRN
Status: DISCONTINUED | OUTPATIENT
Start: 2020-07-07 | End: 2020-07-07

## 2020-07-07 RX ORDER — IBUPROFEN 400 MG/1
400 TABLET ORAL 2 TIMES DAILY
Status: DISCONTINUED | OUTPATIENT
Start: 2020-07-07 | End: 2020-07-08 | Stop reason: HOSPADM

## 2020-07-07 RX ORDER — POTASSIUM CHLORIDE 20 MEQ/1
40 TABLET, EXTENDED RELEASE ORAL PRN
Status: DISCONTINUED | OUTPATIENT
Start: 2020-07-07 | End: 2020-07-08 | Stop reason: HOSPADM

## 2020-07-07 RX ORDER — ACETAMINOPHEN 325 MG/1
650 TABLET ORAL EVERY 6 HOURS PRN
Status: DISCONTINUED | OUTPATIENT
Start: 2020-07-07 | End: 2020-07-08 | Stop reason: HOSPADM

## 2020-07-07 RX ORDER — OMEGA-3-ACID ETHYL ESTERS 1 G/1
1 CAPSULE, LIQUID FILLED ORAL DAILY
Status: DISCONTINUED | OUTPATIENT
Start: 2020-07-07 | End: 2020-07-08 | Stop reason: HOSPADM

## 2020-07-07 RX ORDER — ACETAMINOPHEN 650 MG/1
650 SUPPOSITORY RECTAL EVERY 6 HOURS PRN
Status: DISCONTINUED | OUTPATIENT
Start: 2020-07-07 | End: 2020-07-08 | Stop reason: HOSPADM

## 2020-07-07 RX ORDER — 0.9 % SODIUM CHLORIDE 0.9 %
1000 INTRAVENOUS SOLUTION INTRAVENOUS ONCE
Status: COMPLETED | OUTPATIENT
Start: 2020-07-07 | End: 2020-07-07

## 2020-07-07 RX ORDER — DULOXETIN HYDROCHLORIDE 20 MG/1
20 CAPSULE, DELAYED RELEASE ORAL DAILY
Status: DISCONTINUED | OUTPATIENT
Start: 2020-07-07 | End: 2020-07-07

## 2020-07-07 RX ORDER — ATORVASTATIN CALCIUM 10 MG/1
10 TABLET, FILM COATED ORAL DAILY
Status: DISCONTINUED | OUTPATIENT
Start: 2020-07-07 | End: 2020-07-08 | Stop reason: HOSPADM

## 2020-07-07 RX ORDER — ASCORBIC ACID 500 MG
1000 TABLET ORAL DAILY
Status: DISCONTINUED | OUTPATIENT
Start: 2020-07-07 | End: 2020-07-08 | Stop reason: HOSPADM

## 2020-07-07 RX ORDER — M-VIT,TX,IRON,MINS/CALC/FOLIC 27MG-0.4MG
1 TABLET ORAL DAILY
Status: DISCONTINUED | OUTPATIENT
Start: 2020-07-07 | End: 2020-07-08 | Stop reason: HOSPADM

## 2020-07-07 RX ORDER — ACETAMINOPHEN 325 MG/1
650 TABLET ORAL 2 TIMES DAILY
Status: DISCONTINUED | OUTPATIENT
Start: 2020-07-07 | End: 2020-07-08 | Stop reason: HOSPADM

## 2020-07-07 RX ORDER — DILTIAZEM HYDROCHLORIDE 5 MG/ML
10 INJECTION INTRAVENOUS ONCE
Status: COMPLETED | OUTPATIENT
Start: 2020-07-07 | End: 2020-07-07

## 2020-07-07 RX ORDER — MAGNESIUM SULFATE 1 G/100ML
1 INJECTION INTRAVENOUS PRN
Status: DISCONTINUED | OUTPATIENT
Start: 2020-07-07 | End: 2020-07-08 | Stop reason: HOSPADM

## 2020-07-07 RX ORDER — ASPIRIN 81 MG/1
81 TABLET, CHEWABLE ORAL DAILY
Status: DISCONTINUED | OUTPATIENT
Start: 2020-07-07 | End: 2020-07-08 | Stop reason: HOSPADM

## 2020-07-07 RX ORDER — SODIUM CHLORIDE 0.9 % (FLUSH) 0.9 %
10 SYRINGE (ML) INJECTION EVERY 12 HOURS SCHEDULED
Status: DISCONTINUED | OUTPATIENT
Start: 2020-07-07 | End: 2020-07-08 | Stop reason: HOSPADM

## 2020-07-07 RX ORDER — POTASSIUM CHLORIDE 7.45 MG/ML
10 INJECTION INTRAVENOUS PRN
Status: DISCONTINUED | OUTPATIENT
Start: 2020-07-07 | End: 2020-07-08 | Stop reason: HOSPADM

## 2020-07-07 RX ORDER — DIMENHYDRINATE 50 MG
1000 TABLET ORAL DAILY
Status: DISCONTINUED | OUTPATIENT
Start: 2020-07-07 | End: 2020-07-07 | Stop reason: RX

## 2020-07-07 RX ORDER — SODIUM CHLORIDE 0.9 % (FLUSH) 0.9 %
10 SYRINGE (ML) INJECTION PRN
Status: DISCONTINUED | OUTPATIENT
Start: 2020-07-07 | End: 2020-07-08 | Stop reason: HOSPADM

## 2020-07-07 RX ORDER — DILTIAZEM HYDROCHLORIDE 120 MG/1
120 CAPSULE, COATED, EXTENDED RELEASE ORAL DAILY
Status: DISCONTINUED | OUTPATIENT
Start: 2020-07-07 | End: 2020-07-08 | Stop reason: HOSPADM

## 2020-07-07 RX ORDER — METOPROLOL SUCCINATE 50 MG/1
50 TABLET, EXTENDED RELEASE ORAL 2 TIMES DAILY
Status: DISCONTINUED | OUTPATIENT
Start: 2020-07-07 | End: 2020-07-08 | Stop reason: HOSPADM

## 2020-07-07 RX ORDER — PROCHLORPERAZINE EDISYLATE 5 MG/ML
10 INJECTION INTRAMUSCULAR; INTRAVENOUS EVERY 6 HOURS PRN
Status: DISCONTINUED | OUTPATIENT
Start: 2020-07-07 | End: 2020-07-08 | Stop reason: HOSPADM

## 2020-07-07 RX ADMIN — Medication 1 TABLET: at 16:25

## 2020-07-07 RX ADMIN — ACETAMINOPHEN 650 MG: 325 TABLET ORAL at 20:39

## 2020-07-07 RX ADMIN — DILTIAZEM HYDROCHLORIDE 120 MG: 120 CAPSULE, COATED, EXTENDED RELEASE ORAL at 18:26

## 2020-07-07 RX ADMIN — SODIUM CHLORIDE 1000 ML: 9 INJECTION, SOLUTION INTRAVENOUS at 11:19

## 2020-07-07 RX ADMIN — DILTIAZEM HYDROCHLORIDE 10 MG: 5 INJECTION INTRAVENOUS at 11:19

## 2020-07-07 RX ADMIN — IBUPROFEN 400 MG: 400 TABLET, FILM COATED ORAL at 20:40

## 2020-07-07 RX ADMIN — Medication 400 UNITS: at 16:25

## 2020-07-07 RX ADMIN — APIXABAN 2.5 MG: 2.5 TABLET, FILM COATED ORAL at 20:40

## 2020-07-07 RX ADMIN — METOPROLOL SUCCINATE 50 MG: 50 TABLET, EXTENDED RELEASE ORAL at 20:40

## 2020-07-07 RX ADMIN — Medication 10 ML: at 20:40

## 2020-07-07 ASSESSMENT — PAIN SCALES - GENERAL
PAINLEVEL_OUTOF10: 5
PAINLEVEL_OUTOF10: 0

## 2020-07-07 NOTE — ED PROVIDER NOTES
Emergency Department Provider Note     Location: [facility]  ED  7/7/2020    I independently performed a history and physical on Yusra Milian. All diagnostic, treatment, and disposition decisions were made by myself in conjunction with the mid-level provider. Briefly, this is a 78 y.o. female here for palpitations. Patient reported feeling short of breath and sensation of palpitation since she woke up. Denies prior history of A. fib. She said she has had intermittent palpitation. When she saw the nurse practitioner in her PCPs office, she was told to \"cough\" to get herself back to normal rhythm. She has been doing that all day trying to get back to normal rhythm but it is not working today. She states it has worked in the past.    ED Triage Vitals [07/07/20 1058]   BP Temp Temp src Pulse Resp SpO2 Height Weight   (!) 144/111 98.5 °F (36.9 °C) -- 143 16 98 % 5' 3\" (1.6 m) 102 lb (46.3 kg)        Exam showed WDWN female NAD, ACOx3, tachycardic and irregularly irregular, lungs clear. No facial droop. No slurred speech. Patient seen and examined in room 4. EKG  The Ekg interpreted by me in the absence of a cardiologist shows. atrial fibrillation with a rate of 142  Axis is   Left axis deviation  QTc is  prolonged  LBBB - old  No specific ST-T wave changes appreciated. No evidence of acute ischemia. Compared to prior EKG dated November 2, 2010, patient is in A. fib with RVR. Previous EKG showed normal sinus rhythm with a heart rate of 62. Radiology  Xr Chest Portable    Result Date: 7/7/2020  EXAMINATION: ONE XRAY VIEW OF THE CHEST 7/7/2020 11:04 am COMPARISON: Chest CT 01/18/2017 HISTORY: ORDERING SYSTEM PROVIDED HISTORY: RAPID HR TECHNOLOGIST PROVIDED HISTORY: Reason for exam:->RAPID HR Reason for Exam: rapid HR Acuity: Unknown Type of Exam: Unknown FINDINGS: Heart size borderline. Pulmonary vasculature within normal limits. Thoracic aorta tortuous.   Chronic nodular and irregular opacities in the peripheral aspects of both lungs, not appreciably changed when accounting for difference in modality. Emphysema in the lung apices. Calcified granuloma in the right mid lung. 1. No acute process 2. Chronic nodular changes in the peripheral lungs compatible with postinflammatory change or chronic atypical infectious process       Lab reviewed. Pertinent for electrolytes unremarkable. BUN 31 but normal Cr at 1.1. BNP 5447, Trop 0.02. CBC and LFT WNL. The way the patient described coughing would help in the past suggests she more likely had SVT than A. fib in the past.  However we had no prior EKG that showed SVT either. Presumably, she is in new onset A. fib with RVR today. Due to her initial blood pressure, we gave 10 mg of diltiazem IV bolus and the provided rate control. She will need to be evaluated for her new onset A. fib. For further details of [facility] emergency department encounter, please see MARIANELA Broussard's documentation. Total critical care time is 15 minutes, which excludes separately billable procedures and updating family. Time spent is specifically for management of the presenting complaint and symptoms initially, direct bedside care, reevaluation, review of records, and consultation. There was a high probability of clinically significant life-threatening deterioration in the patient's condition, which required my urgent intervention. This chart was generated in part by using Dragon Dictation system and may contain errors related to that system including errors in grammar, punctuation, and spelling, as well as words and phrases that may be inappropriate. If there are any questions or concerns please feel free to contact the dictating provider for clarification.           Maureen Alvarado MD  07/07/20 3265

## 2020-07-07 NOTE — ED NOTES
Patient appears to be NSR at this time. Printed 5-lead ecg and in chart.       Bill Dempsey RN  07/07/20 8132

## 2020-07-07 NOTE — ED NOTES

## 2020-07-07 NOTE — CONSULTS
[id number]    New AF  Palpitations  Chest pain  CAD based on CTA 2017 nonobstructive.  Nornal myoview at that time  Elev trop  LBBB - old  HLD  HTN  TAA  Mesenteric artery stenosis    Stress test  Echo  Add dilt  ASA, statin, BBlk  Eliquis

## 2020-07-07 NOTE — PROGRESS NOTES
Patient admitted to room 215  from ED. Patient oriented to room, call light, bed rails, phone, lights and bathroom. Patient instructed about the schedule of the day including: vital sign frequency, lab draws, possible tests, frequency of MD and staff rounds, including RN/MD rounding together at bedside, daily weights, and I &O's. Patient instructed about prescribed diet, how to use 8MENU, and television. bed alarm in place, patient aware of placement and reason. Telemetry box number 39 in place, patient aware of placement and reason. Bed locked, in lowest position, side rails up 2/4, call light within reach. Will continue to monitor.

## 2020-07-07 NOTE — ED PROVIDER NOTES
[facility] Emergency Department    CHIEF COMPLAINT  Cough; Shortness of Breath; and Tachycardia (154 AT HOME)      SHARED SERVICE VISIT  I have seen and evaluated this patient with my supervising physician, Dr. Buffy Jaeger. HISTORY OF PRESENT ILLNESS  Monica Gomes is a 78 y.o. female who presents to the ED complaining of several hour history of palpitations and fluttering in the chest with some shortness of breath. Patient drove herself in for evaluation. Reports that symptoms began in middle of night. Palpitations and fluttering in chest.  No chest pain. She denies neck, arm, jaw or back pain. Has had some mild lightheadedness upon standing. No headaches or confusion. She denies abdominal pain. No nausea, vomiting or diarrhea. Has noted no change in appetite. No urinary symptoms. No diarrhea or constipation. She denies leg pain or swelling. No recent travel, trauma or surgery. No prior cardiac history. No other complaints, modifying factors or associated symptoms. Nursing notes reviewed. Past Medical History:   Diagnosis Date    CAD (coronary artery disease)     Cancer ([facility] [address] 2016    skin cancer    Carotid artery disease (HCC)     HTN (hypertension)     Hyperlipemia     Hyperlipidemia     LBP (low back pain)     Lung nodules     Osteoporosis     Seasonal allergies     Stress bladder incontinence, female     Unspecified cerebral artery occlusion with cerebral infarction     Wears glasses      Past Surgical History:   Procedure Laterality Date    CARDIAC CATHETERIZATION      COLONOSCOPY  8/13/2014    VARICOSE VEIN SURGERY       Family History   Problem Relation Age of Onset    Heart Disease Sister     Stroke Mother      Social History     Socioeconomic History    Marital status:       Spouse name: Not on file    Number of children: Not on file    Years of education: Not on file    Highest education level: Not on file   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 Cooperative. No acute distress. HEAD: Normocephalic. Atraumatic. EYES: PERRL. EOM's grossly intact. ENT: Mucous membranes are moist.   NECK: Supple. No JVD. HEART: irregularly irregular. No murmurs. LUNGS: Respirations unlabored. CTAB. Good air exchange. Speaking comfortably in full sentences. No wheezing, rhonchi, rales. ABDOMEN: Soft. Non-distended. Non-tender. No guarding or rebound. No midline pulsatile mass. EXTREMITIES: No peripheral edema. Moves all extremities equally. All extremities neurovascularly intact. No unilateral calf pain, redness or swelling. SKIN: Warm and dry. No acute rashes. NEUROLOGICAL: Alert and oriented. CN's 2-12 intact. No gross facial drooping. Strength 5/5, sensation intact. PSYCHIATRIC: Normal mood and affect. RADIOLOGY  Xr Chest Portable    Result Date: 7/7/2020  EXAMINATION: ONE XRAY VIEW OF THE CHEST 7/7/2020 11:04 am COMPARISON: Chest CT 01/18/2017 HISTORY: ORDERING SYSTEM PROVIDED HISTORY: RAPID HR TECHNOLOGIST PROVIDED HISTORY: Reason for exam:->RAPID HR Reason for Exam: rapid HR Acuity: Unknown Type of Exam: Unknown FINDINGS: Heart size borderline. Pulmonary vasculature within normal limits. Thoracic aorta tortuous. Chronic nodular and irregular opacities in the peripheral aspects of both lungs, not appreciably changed when accounting for difference in modality. Emphysema in the lung apices. Calcified granuloma in the right mid lung. 1. No acute process 2. Chronic nodular changes in the peripheral lungs compatible with postinflammatory change or chronic atypical infectious process     ED COURSE  Pain control was not required while here in the emergency department. Triage vitals with tachycardia and 140s. Stable blood pressure.   EKG appear consistent with A. fib although first 3 beats did appear to be in normal sinus rhythm. She was given a 1 L IV fluid bolus and 10 mg dose of diltiazem. She is now rate controlled although does appear to still be in A. fib. No history of. CBC without leukocytosis or anemia. CMP with slightly elevated BUN and creatinine 3-1 ratio with GFR down to 48. Troponin slightly elevated at 0.02. BNP approximately 5500. Normal magnesium. Stable chest x-ray. Based on new onset A. fib we are at this time recommending admission for further cardiac work-up. Have discussed  case with hospital medicine and orders placed. A discussion was had with Ms. Harris regarding palpitations and dizziness, ED findings and recommendations for admission. Risk management discussed and shared decision making had with patient and/or surrogate. All questions were answered. Patient is in agreement. CRITICAL CARE TIME  30 Minutes of critical care time spent not including separately billable procedures.     MDM  Results for orders placed or performed during the hospital encounter of 07/07/20   CBC Auto Differential   Result Value Ref Range    WBC 7.2 4.0 - 11.0 K/uL    RBC 4.21 4.00 - 5.20 M/uL    Hemoglobin 13.4 12.0 - 16.0 g/dL    Hematocrit 39.8 36.0 - 48.0 %    MCV 94.8 80.0 - 100.0 fL    MCH 31.8 26.0 - 34.0 pg    MCHC 33.5 31.0 - 36.0 g/dL    RDW 14.3 12.4 - 15.4 %    Platelets 715 040 - 160 K/uL    MPV 7.4 5.0 - 10.5 fL    Neutrophils % 55.4 %    Lymphocytes % 31.7 %    Monocytes % 10.9 %    Eosinophils % 1.4 %    Basophils % 0.6 %    Neutrophils Absolute 4.0 1.7 - 7.7 K/uL    Lymphocytes Absolute 2.3 1.0 - 5.1 K/uL    Monocytes Absolute 0.8 0.0 - 1.3 K/uL    Eosinophils Absolute 0.1 0.0 - 0.6 K/uL    Basophils Absolute 0.0 0.0 - 0.2 K/uL   Comprehensive Metabolic Panel   Result Value Ref Range    Sodium 139 136 - 145 mmol/L    Potassium 4.4 3.5 - 5.1 mmol/L    Chloride 99 99 - 110 mmol/L    CO2 29 21 - 32 mmol/L    Anion Gap 11 3 - 16    Glucose 107 (H) 70 - 99 mg/dL    BUN 31 (H) 7 - 20 mg/dL CREATININE 1.1 0.6 - 1.2 mg/dL    GFR Non- 48 (A) >60    GFR  58 (A) >60    Calcium 10.4 8.3 - 10.6 mg/dL    Total Protein 7.3 6.4 - 8.2 g/dL    Alb 4.3 3.4 - 5.0 g/dL    Albumin/Globulin Ratio 1.4 1.1 - 2.2    Total Bilirubin 0.3 0.0 - 1.0 mg/dL    Alkaline Phosphatase 75 40 - 129 U/L    ALT 14 10 - 40 U/L    AST 24 15 - 37 U/L    Globulin 3.0 g/dL   Troponin   Result Value Ref Range    Troponin 0.02 (H) <0.01 ng/mL   Magnesium   Result Value Ref Range    Magnesium 2.10 1.80 - 2.40 mg/dL   Brain Natriuretic Peptide   Result Value Ref Range    Pro-BNP 5,447 (H) 0 - 449 pg/mL   Lipase   Result Value Ref Range    Lipase 57.0 13.0 - 60.0 U/L   EKG 12 Lead   Result Value Ref Range    Ventricular Rate 142 BPM    Atrial Rate 120 BPM    QRS Duration 132 ms    Q-T Interval 326 ms    QTc Calculation (Bazett) 501 ms    R Axis -85 degrees    T Axis 83 degrees    Diagnosis       Atrial fibrillation with rapid ventricular responseLeft axis deviationLeft bundle branch blockAbnormal ECGWhen compared with ECG of 02-NOV-2010 05:17,Atrial fibrillation has replaced Sinus rhythmVent. rate has increased BY  80 BPM     I spoke with Sonya Young and Rolando. We thoroughly discussed the history, physical exam, laboratory and imaging studies, as well as, emergency department course. Based upon that discussion, we've decided to admit Ho Perrin to the hospital for further observation, evaluation, and treatment. Final Impression  1. New onset a-fib (HCC)    2. Dehydration      Blood pressure (!) 153/87, pulse 61, temperature 97.9 °F (36.6 °C), temperature source Oral, resp. rate 18, height 5' 3\" (1.6 m), weight 100 lb 2 oz (45.4 kg), SpO2 98 %, not currently breastfeeding. DISPOSITION  Patient was admitted to the hospital in stable condition.           [place], [place]  07/07/20 9961

## 2020-07-07 NOTE — PROGRESS NOTES
4 Eyes Skin Assessment     The patient is being assess for  Admission    I agree that 2 RN's have performed a thorough Head to Toe Skin Assessment on the patient. ALL assessment sites listed below have been assessed. Areas assessed by both nurses:   [x]   Head, Face, and Ears   [x]   Shoulders, Back, and Chest  [x]   Arms, Elbows, and Hands   [x]   Coccyx, Sacrum, and Ischum  [x]   Legs, Feet, and Heels        Does the Patient have Skin Breakdown?   No         Brando Prevention initiated:  No   Wound Care Orders initiated:  No      [facility] nurse consulted for Pressure Injury (Stage 3,4, Unstageable, DTI, NWPT, and Complex wounds):  No      Nurse 1 eSignature: Electronically signed by Irma Paris RN on 7/7/20 at 5:17 PM EDT    **SHARE this note so that the co-signing nurse is able to place an eSignature**    Nurse 2 eSignature: Electronically signed by Vee Vigil RN on 7/7/20 at 5:57 PM EDT

## 2020-07-07 NOTE — H&P
Hospital Medicine History & Physical      PCP: Tomasz Arroyo, APRN - CNP    Date of Admission: 7/7/2020    Date of Service: Pt seen/examined on 07/07/20 and Admitted to Inpatient with expected LOS greater than two midnights due to medical therapy. Chief Complaint:  palpitations    History Of Present Illness:    78 Y F with a h/o HTN, HLD, CAD, CVA, and mesenteric artery stenosis presents after waking up early this morning with racing palpitations. She felt like her heart was \"fluttering. \"  There was no chest pain or dyspnea. When she did her daily exercise routine she felt \"a little faint\" but not badly so. She has noticed that a few times lately her BP monitor has said that her HR is fast, sometimes 130's to 150's. She has had palpitations intermittently over the years and follows with cardiology but there have never been any definitive findings. In the ED she was found to be in Afib, with ventricular rates of about 140. After a bolus of IV diltiazem her rates mostly normalized and she felt better. She remained in Afib. Past Medical History:          Diagnosis Date    CAD (coronary artery disease)     Cancer ([facility] [address] 2016    skin cancer    Carotid artery disease (HCC)     HTN (hypertension)     Hyperlipemia     Hyperlipidemia     LBP (low back pain)     Lung nodules     Osteoporosis     Seasonal allergies     Stress bladder incontinence, female     Unspecified cerebral artery occlusion with cerebral infarction     Wears glasses        Past Surgical History:          Procedure Laterality Date    CARDIAC CATHETERIZATION      COLONOSCOPY  8/13/2014    VARICOSE VEIN SURGERY         Medications Prior to Admission:      Prior to Admission medications    Medication Sig Start Date End Date Taking?  Authorizing Provider   DULoxetine (CYMBALTA) 20 MG extended release capsule  3/19/20   Historical Provider, MD   tiZANidine (ZANAFLEX) 2 MG tablet Take 1 tablet by mouth nightly as needed (back pain/muscle spasm) 5/21/20   NED Dominguez CNP   meloxicam (MOBIC) 7.5 MG tablet Take 1 tablet by mouth daily as needed for Pain 5/21/20   NED Dominguez CNP   metoprolol succinate (TOPROL XL) 50 MG extended release tablet TAKE ONE TABLET BY MOUTH DAILY 10/7/19   NED Munoz CNP   simvastatin (ZOCOR) 20 MG tablet TAKE ONE TABLET BY MOUTH DAILY 10/7/19   NED Munoz CNP   ibuprofen (ADVIL;MOTRIN) 200 MG tablet Take 200 mg by mouth every 6 hours as needed for Pain    Historical Provider, MD   acetaminophen (TYLENOL) 500 MG tablet Take 500 mg by mouth every 6 hours as needed for Pain    Historical Provider, MD   Glucosamine-Chondroitin 750-600 MG CHEW Take  by mouth 2 times daily. Historical Provider, MD   magnesium (MAGNESIUM-OXIDE) 250 MG TABS tablet Take 250 mg by mouth daily. Historical Provider, MD   Coenzyme Q10 (COQ10) 50 MG CAPS Take  by mouth 2 times daily. Historical Provider, MD   Flaxseed, Linseed, (FLAX SEED OIL) 1000 MG CAPS Take 1,000 mg by mouth daily. Historical Provider, MD   Vitamin D 3 (CHOLECALCIFEROL) 1000 UNITS TABS tablet Take 1,000 Units by mouth daily. Historical Provider, MD   aspirin 325 MG tablet Take 325 mg by mouth daily. Historical Provider, MD   fish oil-omega-3 fatty acids 1000 MG capsule Take 1 g by mouth daily. Historical Provider, MD   therapeutic multivitamin-minerals (THERAGRAN-M) tablet Take 1 tablet by mouth daily. Historical Provider, MD   vitamin E 400 UNIT capsule Take 400 Units by mouth daily. Historical Provider, MD   Calcium Citrate-Vitamin D 200-250 MG-UNIT TABS Take  by mouth. Historical Provider, MD   Ascorbic Acid (VITAMIN C) 500 MG tablet Take 1,000 mg by mouth daily. Historical Provider, MD   folic acid (FOLVITE) 1 MG tablet Take 400 mg by mouth daily. Historical Provider, MD   Zinc 50 MG CAPS Take  by mouth.       Historical Provider, MD       Allergies:  Pneumococcal vaccines    Social History:      The patient currently lives at home    TOBACCO:   reports that she has never smoked. She has never used smokeless tobacco.  ETOH:   reports no history of alcohol use. E-Cigarettes Vaping or Juuling     Questions Responses    Vaping Use Never User    Start Date     Does device contain nicotine? Quit Date     Vaping Type             Family History:      Reviewed in detail and negative for ESRD. Positive as follows:        Problem Relation Age of Onset    Heart Disease Sister     Stroke Mother        REVIEW OF SYSTEMS:   Pertinent positives as noted in the HPI. All other systems reviewed and negative. PHYSICAL EXAM PERFORMED:    BP (!) 146/101   Pulse 82   Temp 98.1 °F (36.7 °C)   Resp 18   Ht 5' 3\" (1.6 m)   Wt 102 lb (46.3 kg)   SpO2 96%   BMI 18.07 kg/m²     General appearance:  No apparent distress, appears stated age and cooperative. HEENT:  Normal cephalic, atraumatic without obvious deformity. Pupils equal, round, and reactive to light. Extra ocular muscles intact. Conjunctivae/corneas clear. Neck: Supple, with full range of motion. No jugular venous distention. Trachea midline. Respiratory:  Normal respiratory effort. Clear to auscultation, bilaterally without Rales/Wheezes/Rhonchi. Cardiovascular:  Tachycardic, irregular with normal S1/S2 without murmurs, rubs or gallops. Abdomen: Soft, non-tender, non-distended with normal bowel sounds. Musculoskeletal:  No clubbing, cyanosis or edema bilaterally. Full range of motion without deformity. Skin: Skin color, texture, turgor normal.  No rashes or lesions. Neurologic:  Neurovascularly intact without any focal sensory/motor deficits. Cranial nerves: II-XII intact, grossly non-focal.  Psychiatric:  Alert and oriented, thought content appropriate, normal insight. Anxious affect. Mild cognitive impairment is evident.    Capillary Refill: Brisk,< 3 seconds   Peripheral Pulses: +2 palpable, equal bilaterally       Labs: Recent Labs     07/07/20  1111   WBC 7.2   HGB 13.4   HCT 39.8        Recent Labs     07/07/20  1111      K 4.4   CL 99   CO2 29   BUN 31*   CREATININE 1.1   CALCIUM 10.4     Recent Labs     07/07/20  1111   AST 24   ALT 14   BILITOT 0.3   ALKPHOS 75     No results for input(s): INR in the last 72 hours. Recent Labs     07/07/20  1111   TROPONINI 0.02*       Urinalysis:      Lab Results   Component Value Date    BLOODU + 02/26/2020    SPECGRAV 1.030 02/26/2020    GLUCOSEU neg 02/26/2020       Radiology:     CXR: I have reviewed the CXR with the following interpretation: chronic peripheral airspace disease  EKG:  I have reviewed the EKG with the following interpretation: Afib, RVR, LBBB    XR CHEST PORTABLE   Final Result   1. No acute process   2. Chronic nodular changes in the peripheral lungs compatible with   postinflammatory change or chronic atypical infectious process             ASSESSMENT:    Active Hospital Problems    Diagnosis Date Noted    Atrial fibrillation with RVR ([address] [I48.91] 07/07/2020    Paroxysmal atrial fibrillation with rapid ventricular response ([address] [I48.0] 07/07/2020         PLAN:    78 Y F with a h/o HTN, HLD, CAD, CVA, and mesenteric artery stenosis presents after waking up early this morning with racing palpitations. She felt like her heart was \"fluttering. \"  There was no chest pain or dyspnea. When she did her daily exercise routine she felt \"a little faint\" but not badly so. She has noticed that a few times lately her BP monitor has said that her HR is fast, sometimes 130's to 150's. She has had palpitations intermittently over the years and follows with cardiology but there have never been any definitive findings. In the ED she was found to be in Afib, with ventricular rates of about 140. After a bolus of IV diltiazem her rates mostly normalized and she felt better. She remained in Afib.       Afib (new diagnosis), with symptomatic RVR  - dilt bolus in ED, didn't have to start dilt gtt. Increased metoprolol.  - f/u TTE  - f/u TSH. Monitor electrolytes. - started apixaban. We discussed the risks and benefits of various anticoagulants in detail, including the risk of life-threatening bleeding. We specifically discussed how the novel anticoagulants have no readily-available reversal agent. The patient demonstrated understanding.    - follow up with outpatient cardiology    Elevated troponin, in the setting of CAD  - no chest pain. Demand due to above issue. Monitor. HTN  - metoprolol as above    HLD  - statin    Mesenteric artery stenosis  - she has been on chronic aspirin, will leave this for now. Consider discontinuing this after f/u with outpatient cardiology now that she is on full anticoagulant. Chronic pulmonary nodules, bronchiectasis, and fibrosis  - continue to follow with Dr. Lilian Bowie. DVT Prophylaxis: anticoagulation as above  Diet: No diet orders on file  Code Status: No Order    PT/OT Eval Status: not indicated    Dispo - when rates consistently controlled, perhaps 7/8-9. She lives at home. Cyrus Gracia MD    Thank you Copier How To, APRN - CNP for the opportunity to be involved in this patient's care. If you have any questions or concerns please feel free to contact me at [phone].

## 2020-07-08 ENCOUNTER — APPOINTMENT (OUTPATIENT)
Dept: NUCLEAR MEDICINE | Age: 80
DRG: 309 | End: 2020-07-08
Payer: MEDICARE

## 2020-07-08 VITALS
RESPIRATION RATE: 16 BRPM | BODY MASS INDEX: 17.74 KG/M2 | OXYGEN SATURATION: 92 % | HEIGHT: 63 IN | WEIGHT: 100.13 LBS | HEART RATE: 59 BPM | DIASTOLIC BLOOD PRESSURE: 93 MMHG | TEMPERATURE: 97.5 F | SYSTOLIC BLOOD PRESSURE: 162 MMHG

## 2020-07-08 LAB
ANION GAP SERPL CALCULATED.3IONS-SCNC: 8 MMOL/L (ref 3–16)
BUN BLDV-MCNC: 19 MG/DL (ref 7–20)
CALCIUM SERPL-MCNC: 9.6 MG/DL (ref 8.3–10.6)
CHLORIDE BLD-SCNC: 102 MMOL/L (ref 99–110)
CO2: 29 MMOL/L (ref 21–32)
CREAT SERPL-MCNC: 0.8 MG/DL (ref 0.6–1.2)
GFR AFRICAN AMERICAN: >60
GFR NON-AFRICAN AMERICAN: >60
GLUCOSE BLD-MCNC: 86 MG/DL (ref 70–99)
HCT VFR BLD CALC: 37 % (ref 36–48)
HEMOGLOBIN: 12.7 G/DL (ref 12–16)
LV EF: 41 %
LVEF MODALITY: NORMAL
MAGNESIUM: 2 MG/DL (ref 1.8–2.4)
MCH RBC QN AUTO: 32.7 PG (ref 26–34)
MCHC RBC AUTO-ENTMCNC: 34.3 G/DL (ref 31–36)
MCV RBC AUTO: 95.2 FL (ref 80–100)
PDW BLD-RTO: 14.1 % (ref 12.4–15.4)
PLATELET # BLD: 185 K/UL (ref 135–450)
PMV BLD AUTO: 7 FL (ref 5–10.5)
POTASSIUM SERPL-SCNC: 4.4 MMOL/L (ref 3.5–5.1)
RBC # BLD: 3.88 M/UL (ref 4–5.2)
SODIUM BLD-SCNC: 139 MMOL/L (ref 136–145)
TROPONIN: 0.01 NG/ML
WBC # BLD: 3.9 K/UL (ref 4–11)

## 2020-07-08 PROCEDURE — 99233 SBSQ HOSP IP/OBS HIGH 50: CPT | Performed by: INTERNAL MEDICINE

## 2020-07-08 PROCEDURE — 2580000003 HC RX 258: Performed by: INTERNAL MEDICINE

## 2020-07-08 PROCEDURE — 6360000002 HC RX W HCPCS: Performed by: INTERNAL MEDICINE

## 2020-07-08 PROCEDURE — 78452 HT MUSCLE IMAGE SPECT MULT: CPT

## 2020-07-08 PROCEDURE — 93017 CV STRESS TEST TRACING ONLY: CPT

## 2020-07-08 PROCEDURE — 80048 BASIC METABOLIC PNL TOTAL CA: CPT

## 2020-07-08 PROCEDURE — 6370000000 HC RX 637 (ALT 250 FOR IP): Performed by: INTERNAL MEDICINE

## 2020-07-08 PROCEDURE — 83735 ASSAY OF MAGNESIUM: CPT

## 2020-07-08 PROCEDURE — 3430000000 HC RX DIAGNOSTIC RADIOPHARMACEUTICAL: Performed by: INTERNAL MEDICINE

## 2020-07-08 PROCEDURE — 85027 COMPLETE CBC AUTOMATED: CPT

## 2020-07-08 PROCEDURE — 84484 ASSAY OF TROPONIN QUANT: CPT

## 2020-07-08 PROCEDURE — 36415 COLL VENOUS BLD VENIPUNCTURE: CPT

## 2020-07-08 PROCEDURE — A9502 TC99M TETROFOSMIN: HCPCS | Performed by: INTERNAL MEDICINE

## 2020-07-08 RX ORDER — DILTIAZEM HYDROCHLORIDE 120 MG/1
120 CAPSULE, COATED, EXTENDED RELEASE ORAL DAILY
Qty: 30 CAPSULE | Refills: 3 | Status: SHIPPED | OUTPATIENT
Start: 2020-07-09 | End: 2020-08-03

## 2020-07-08 RX ORDER — LOSARTAN POTASSIUM 25 MG/1
50 TABLET ORAL DAILY
Status: DISCONTINUED | OUTPATIENT
Start: 2020-07-08 | End: 2020-07-08 | Stop reason: HOSPADM

## 2020-07-08 RX ORDER — METOPROLOL SUCCINATE 100 MG/1
100 TABLET, EXTENDED RELEASE ORAL DAILY
Qty: 30 TABLET | Refills: 0 | Status: SHIPPED | OUTPATIENT
Start: 2020-07-08 | End: 2021-01-01 | Stop reason: SDUPTHER

## 2020-07-08 RX ADMIN — APIXABAN 2.5 MG: 2.5 TABLET, FILM COATED ORAL at 08:17

## 2020-07-08 RX ADMIN — TETROFOSMIN 10.6 MILLICURIE: 1.38 INJECTION, POWDER, LYOPHILIZED, FOR SOLUTION INTRAVENOUS at 09:04

## 2020-07-08 RX ADMIN — ATORVASTATIN CALCIUM 10 MG: 10 TABLET, FILM COATED ORAL at 08:18

## 2020-07-08 RX ADMIN — ACETAMINOPHEN 650 MG: 325 TABLET ORAL at 08:18

## 2020-07-08 RX ADMIN — TETROFOSMIN 30.2 MILLICURIE: 1.38 INJECTION, POWDER, LYOPHILIZED, FOR SOLUTION INTRAVENOUS at 10:51

## 2020-07-08 RX ADMIN — LOSARTAN POTASSIUM 50 MG: 25 TABLET, FILM COATED ORAL at 13:01

## 2020-07-08 RX ADMIN — Medication 10 ML: at 08:19

## 2020-07-08 RX ADMIN — ASPIRIN 81 MG: 81 TABLET, CHEWABLE ORAL at 08:18

## 2020-07-08 RX ADMIN — IBUPROFEN 400 MG: 400 TABLET, FILM COATED ORAL at 08:18

## 2020-07-08 RX ADMIN — METOPROLOL SUCCINATE 50 MG: 50 TABLET, EXTENDED RELEASE ORAL at 12:57

## 2020-07-08 RX ADMIN — DILTIAZEM HYDROCHLORIDE 120 MG: 120 CAPSULE, COATED, EXTENDED RELEASE ORAL at 08:18

## 2020-07-08 RX ADMIN — REGADENOSON 0.4 MG: 0.08 INJECTION, SOLUTION INTRAVENOUS at 10:51

## 2020-07-08 ASSESSMENT — PAIN SCALES - GENERAL
PAINLEVEL_OUTOF10: 6
PAINLEVEL_OUTOF10: 0

## 2020-07-08 NOTE — CONSULTS
[address] [id number]                                  CONSULTATION    PATIENT NAME: Dolly Lowery                  :        1940  MED REC NO:   [id number]                          ROOM:       0215  ACCOUNT NO:   [de-identified]                           ADMIT DATE: 2020  PROVIDER:     Leti Bradshaw. Darnell Gale MD    CONSULT DATE:  2020    REASON FOR CONSULTATION:  Atrial fibrillation, chest pain. HISTORY OF PRESENT ILLNESS:  An 78-year-old female presented to the  hospital with complaints of palpitations, chest pain. She was found to  be in atrial fibrillation, Cardiology consultation requested. She has  been having symptoms over the past several days. It is intermittent,  usually only lasts a few minutes, but then this last episode lasted  about an hour for which she came in. She feels her heart is racing. She gets associated chest pain. She also notes that she has been  getting similar chest pain at times when she exerts herself. She also  gets it at rest.  Feels like a sharp pain in the center of her chest,  does not radiate, usually only lasts a few minutes. She sometimes gets  some associated shortness of breath. It spontaneously resolved. PAST MEDICAL HISTORY:  1. Coronary artery disease based on a CTA of the coronary arteries in  2017. She had a normal stress test at that time. 2.  Left bundle-branch block. 3.  Hyperlipidemia. 4.  Hypertension. 5.  Thoracic aortic aneurysm. 6.  Mesenteric artery stenosis. SOCIAL HISTORY:  Does not smoke. FAMILY HISTORY:  Positive for heart disease. REVIEW OF SYSTEMS:  Denies fevers, chills, cough. No focal neurologic  symptoms. No headache or visual changes. No recent GI or  bleeding. No recent or upcoming surgeries. All other systems are negative except  history of present illness.     ALLERGIES:  PNEUMOCOCCAL VACCINES. MEDICATIONS:  See list in the chart which I have reviewed. PHYSICAL EXAMINATION:  VITAL SIGNS:  Blood pressure is 153/87, heart rate 61, respirations 18,  temperature 97.9. She is 98% saturated on room air. Physical exam deferred due to COVID-19 pandemic. DIAGNOSTIC DATA:  Significant laboratory data includes a troponin of  0.02, ProBNP of 5,447. Chest x-ray shows no acute process. EKG, atrial  fibrillation, left bundle-branch block. IMPRESSION:  1.  New onset atrial fibrillation, symptomatic. 2.  Palpitations due to tachyarrhythmia. 3.  Chest pain with suggestive of angina. 4.  Coronary artery disease based on CTA of the coronary arteries, 2017. Normal Myoview stress test at that time. 5.  Elevated troponin likely due to supply demand imbalance. Consider  progression of coronary artery disease. 6.  Left bundle-branch block, old. 7.  Hyperlipidemia. 8.  Hypertension, suboptimal.  9.  Thoracic aortic aneurysm. 10.  Mesenteric artery stenosis. RECOMMENDATIONS:  1. Stress test.  2.  Echocardiogram.  3.  We will add diltiazem. 4.  Continue aspirin, statin, and beta blocker. 5.  Anticoagulation with Eliquis. SINAI Diaz MD    D: 07/07/2020 17:28:42       T: 07/07/2020 23:08:40     [id number]  Job#: [id number]     Doc#: [id number]    CC:

## 2020-07-08 NOTE — PROGRESS NOTES
Pt d/c'd home. Removed right arm  IV and stopped bleeding. Catheter intact. Pt tolerated well. No redness noted at site. Notified [facility] and removed tele box. Reviewed d/c instructions, home meds, and  f/u information utilizing teach-back method. Scripts for apixaban, diltiazem, metorpolol sent to [facility]. Patient verbalized understanding.

## 2020-07-08 NOTE — PLAN OF CARE
Problem: Falls - Risk of:  Goal: Will remain free from falls  Description: Will remain free from falls  7/8/2020 0823 by Wilber Hernandez RN  Outcome: Ongoing

## 2020-07-08 NOTE — DISCHARGE SUMMARY
clubbing, cyanosis or edema bilaterally. Full range of motion without deformity. Skin: Skin color, texture, turgor normal.  No rashes or lesions. Neurologic:  Neurovascularly intact without any focal sensory/motor deficits. Cranial nerves: II-XII intact, grossly non-focal.  Psychiatric:  Alert and oriented, thought content appropriate, normal insight. Anxious affect. Mild cognitive impairment is evident. Capillary Refill: Brisk,< 3 seconds   Peripheral Pulses: +2 palpable, equal bilaterally       Labs: For convenience and continuity at follow-up the following most recent labs are provided:      CBC:    Lab Results   Component Value Date    WBC 3.9 07/08/2020    HGB 12.7 07/08/2020    HCT 37.0 07/08/2020     07/08/2020       Renal:    Lab Results   Component Value Date     07/08/2020    K 4.4 07/08/2020     07/08/2020    CO2 29 07/08/2020    BUN 19 07/08/2020    CREATININE 0.8 07/08/2020    CALCIUM 9.6 07/08/2020         Significant Diagnostic Studies    Radiology:   NM Cardiac Stress Test Nuclear Imaging   Final Result      XR CHEST PORTABLE   Final Result   1. No acute process   2. Chronic nodular changes in the peripheral lungs compatible with   postinflammatory change or chronic atypical infectious process                Consults:     IP CONSULT TO HOSPITALIST  IP CONSULT TO CARDIOLOGY    Disposition:  home     Condition at Discharge: Stable    Discharge Instructions/Follow-up:  Follow up with Yajaira Weems within 1-2 weeks.        Code Status:  Full Code     Activity: activity as tolerated    Diet: regular diet      Discharge Medications:     Current Discharge Medication List           Details   apixaban (ELIQUIS) 2.5 MG TABS tablet Take 1 tablet by mouth 2 times daily  Qty: 60 tablet, Refills: 0      dilTIAZem (DILTIAZEM CD) 120 MG extended release capsule Take 1 capsule by mouth daily  Qty: 30 capsule, Refills: 3              Details   metoprolol succinate (TOPROL XL) 100 MG extended release

## 2020-07-08 NOTE — PROGRESS NOTES
A Latricia stress test was completed on this patient as ordered. The patient tolerated the procedure well. Awaiting stress imaging at this time.

## 2020-07-08 NOTE — PROGRESS NOTES
[facility]   Progress Note  Cardiology    CC: palpitations and chest pain    HPI: few palps overnight. No cp/sob    Medications/Labs all Reviewed    Lab Results   Component Value Date    WBC 3.9 (L) 07/08/2020    HGB 12.7 07/08/2020    HCT 37.0 07/08/2020    MCV 95.2 07/08/2020     07/08/2020     Lab Results   Component Value Date    CREATININE 0.8 07/08/2020    BUN 19 07/08/2020     07/08/2020    K 4.4 07/08/2020     07/08/2020    CO2 29 07/08/2020     Lab Results   Component Value Date    INR 0.98 11/02/2010    PROTIME 11.2 11/02/2010        Physical Examination:    BP (!) 161/92   Pulse 58   Temp 97.6 °F (36.4 °C) (Axillary)   Resp 16   Ht 5' 3\" (1.6 m)   Wt 100 lb 2 oz (45.4 kg)   SpO2 98%   BMI 17.74 kg/m²      Deferred due to covid pandemic    Assessment:    New AF - converted to NSR. stable  Palpitations - resolved. Due to AF/RVR  Chest pain - exacerbated by tachycardia   CAD based on CTA 2017 nonobstructive. Nornal myoview 2017  Elevated troponin likely due to supply demand imbalance.  Consider progression of coronary artery disease  LBBB - old  HLD  HTN - suboptimal  TAA  Mesenteric artery stenosis     Plan  Stress test  Echo  ASA, statin, BBlk, CaBlk  Add ARB  Eliquis Blease Goldberg, MD, 7/8/2020 9:17 AM

## 2020-07-09 ENCOUNTER — TELEPHONE (OUTPATIENT)
Dept: INTERNAL MEDICINE CLINIC | Age: 80
End: 2020-07-09

## 2020-07-09 ENCOUNTER — CARE COORDINATION (OUTPATIENT)
Dept: CASE MANAGEMENT | Age: 80
End: 2020-07-09

## 2020-07-09 NOTE — CARE COORDINATION
[facility] [facility] Transitions Initial Follow Up Call/BPCI    Call within 2 business days of discharge: Yes    Patient: Monserrat Cameron Patient : 1940   MRN: [id number]  Reason for Admission: New Onset A-Fib   Discharge Date: 20 RARS: Readmission Risk Score: 12      Last Discharge [address] [address]       Complaint Diagnosis Description Type Department Provider    20 Cough; Shortness of Breath; Tachycardia New onset a-fib ([facility] [address] . .. ED to Hosp-Admission (Discharged) (ADMITTED) Emily Galvan MD; Darleen Quintanilla .. Attempted to reach patient via phone for initial post hospital transition call. Received message call could not be completed at this time and the number needed to be checked or had been disconnected.        Follow Up  Future Appointments   Date Time Provider Antony Charlton   8/3/2020  2:00 PM NED Hopkins CNP AND NADER Terrell RN

## 2020-07-15 ENCOUNTER — CARE COORDINATION (OUTPATIENT)
Dept: CASE MANAGEMENT | Age: 80
End: 2020-07-15

## 2020-07-15 NOTE — CARE COORDINATION
[address] for Care Improvement ([facility]) Follow Up Call  Qualifying Diagnosis of Cardiac Arrhythmia. 7/15/2020  Patient Name:  Arely German   YOB: 1940  Discharge Date:  7/8/20  RARS:  Readmission Risk Score: 12    PCP:  Asif Bragg has been disconnected. Unable to find a HIPPA form on chart.    Future Appointments   Date Time Provider Antony Charlton   8/3/2020  2:00 PM NED Alfaro CNP AND [facility]       Care Transitions will continue to follow per [facility] , RN, CTN

## 2020-07-22 ENCOUNTER — CARE COORDINATION (OUTPATIENT)
Dept: CASE MANAGEMENT | Age: 80
End: 2020-07-22

## 2020-07-22 NOTE — CARE COORDINATION
[facility] Transitions Initial Follow Up Call    Call within 2 business days of discharge:     Patient: Pedro Stover Patient : 1940   MRN: [id number]  Reason for Admission:   Discharge Date: 20 RARS: Readmission Risk Score: 12      Last Discharge [facility]       Complaint Diagnosis Description Type Department Provider    20 Cough; Shortness of Breath; Tachycardia New onset a-fib ([address] . .. ED to Hosp-Admission (Discharged) (ADMITTED) Domenick Ahumada, MD; Sofya Davis... Spoke with: Kay Workman, patient's daughter (on HIPAA)    Facility: [facility]    Follow Up:  Contacted patient for BPCI-A follow up. Home phone number listed is not a working number. Contacted patient's daughter Kay Workman. She stated her mother is doing okay. Reports Jeannie Domingo had a follow up with cardiology last week. Cardiologist changed a couple of her medications but Kay Workman not sure of the name of medications. She reports swelling has decreased since changes were made. Her mother does not have any c/o chest pain/discomfort, shortness of breath, cough. She stated her mother changed phone provider recently. Marina's current phone number is [phone] although she has been having problems with her phone. Attempted to contact patient. Unable to reach patient. No answer. Will try again at a later time.         Future Appointments   Date Time Provider Antony Charlton   8/3/2020  2:00 PM NED Gay CNP AND HILL [facility] Thornton Bernheim, RN

## 2020-07-29 ENCOUNTER — CARE COORDINATION (OUTPATIENT)
Dept: CASE MANAGEMENT | Age: 80
End: 2020-07-29

## 2020-07-29 NOTE — CARE COORDINATION
[address] [facility] Transitions Bundled Payments for Care Improvement (BPCI) Follow Up Call  Qualifying Diagnosis of Cardiac Arrhythmia.    7/29/2020  Patient Name:  Monica Gomes   YOB: 1940  Discharge Date:  7/8/20  RARS:  Readmission Risk Score: 12    PCP:  NED Ramsey CNP  Message left in compliance with HIPPA. Stated who I was, representing the [facility] SPECIALTY [facility], Care Transitions Team. Requested to place a call to their Physician with any immediate health needs/questions/concerns.         Future Appointments   Date Time Provider Antony Charlton   8/3/2020  2:00 PM NED Ramsey CNP [facility] AND [facility]       Care Transitions will continue to follow per [facility] , RN, CTN

## 2020-08-03 ENCOUNTER — OFFICE VISIT (OUTPATIENT)
Dept: INTERNAL MEDICINE CLINIC | Age: 80
End: 2020-08-03
Payer: MEDICARE

## 2020-08-03 VITALS
TEMPERATURE: 98.1 F | BODY MASS INDEX: 17.71 KG/M2 | WEIGHT: 100 LBS | DIASTOLIC BLOOD PRESSURE: 92 MMHG | SYSTOLIC BLOOD PRESSURE: 142 MMHG | HEART RATE: 64 BPM | OXYGEN SATURATION: 100 %

## 2020-08-03 PROCEDURE — 1123F ACP DISCUSS/DSCN MKR DOCD: CPT | Performed by: NURSE PRACTITIONER

## 2020-08-03 PROCEDURE — 4040F PNEUMOC VAC/ADMIN/RCVD: CPT | Performed by: NURSE PRACTITIONER

## 2020-08-03 PROCEDURE — G0439 PPPS, SUBSEQ VISIT: HCPCS | Performed by: NURSE PRACTITIONER

## 2020-08-03 RX ORDER — SPIRONOLACTONE 25 MG/1
25 TABLET ORAL DAILY
Status: ON HOLD | COMMUNITY
Start: 2020-07-14 | End: 2021-01-01 | Stop reason: HOSPADM

## 2020-08-03 ASSESSMENT — PATIENT HEALTH QUESTIONNAIRE - PHQ9: SUM OF ALL RESPONSES TO PHQ QUESTIONS 1-9: 12

## 2020-08-03 NOTE — PATIENT INSTRUCTIONS
Personalized Preventive Plan for Monica Gomes - 8/3/2020  Medicare offers a range of preventive health benefits. Some of the tests and screenings are paid in full while other may be subject to a deductible, co-insurance, and/or copay. Some of these benefits include a comprehensive review of your medical history including lifestyle, illnesses that may run in your family, and various assessments and screenings as appropriate. After reviewing your medical record and screening and assessments performed today your provider may have ordered immunizations, labs, imaging, and/or referrals for you. A list of these orders (if applicable) as well as your Preventive Care list are included within your After Visit Summary for your review. Other Preventive Recommendations:    · A preventive eye exam performed by an eye specialist is recommended every 1-2 years to screen for glaucoma; cataracts, macular degeneration, and other eye disorders. · A preventive dental visit is recommended every 6 months. · Try to get at least 150 minutes of exercise per week or 10,000 steps per day on a pedometer . · Order or download the FREE \"Exercise & Physical Activity: Your Everyday Guide\" from [employer]. Call 3-190.964.7767 or search [employer] online. · You need 7095-0079 mg of calcium and 4601-2374 IU of vitamin D per day. It is possible to meet your calcium requirement with diet alone, but a vitamin D supplement is usually necessary to meet this goal.  · When exposed to the sun, use a sunscreen that protects against both UVA and UVB radiation with an SPF of 30 or greater. Reapply every 2 to 3 hours or after sweating, drying off with a towel, or swimming. · Always wear a seat belt when traveling in a car. Always wear a helmet when riding a bicycle or motorcycle. Start Cymbalta daily (20mg)    Follow up with Dr Emili Cardenas in 2-3 months and then decide if you'd like to see Dr Rosemary Crump. Move exercises to 7AM so you can have more time to sleep. May give you more energy later in the day.

## 2020-08-03 NOTE — PROGRESS NOTES
Medicare Annual Wellness Visit  Name: Elva Rivers Date: 8/3/2020   MRN: <[id number]> Sex: Female   Age: 78 y.o. Ethnicity: Non-/Non    : 1940 Race: Stephania Perales is here for No chief complaint on file. Screenings for behavioral, psychosocial and functional/safety risks, and cognitive dysfunction are all negative except as indicated below. These results, as well as other patient data from the [address] form, are documented in Flowsheets linked to this Encounter. Pt wakes every 2 hours to urinate but is able to fall back asleep. She napping more during the day and states she can fall asleep most any time. Allergies   Allergen Reactions    Pneumococcal Vaccines          Prior to Visit Medications    Medication Sig Taking? Authorizing Provider   spironolactone (ALDACTONE) 25 MG tablet Take 25 mg by mouth daily Yes Historical Provider, MD   apixaban (ELIQUIS) 2.5 MG TABS tablet Take 1 tablet by mouth 2 times daily Yes Yuri Brink MD   metoprolol succinate (TOPROL XL) 100 MG extended release tablet Take 1 tablet by mouth daily Yes Yuri Brink MD   simvastatin (ZOCOR) 20 MG tablet TAKE ONE TABLET BY MOUTH DAILY Yes NED Manuel CNP   acetaminophen (TYLENOL) 500 MG tablet Take 500 mg by mouth every 6 hours as needed for Pain Yes Historical Provider, MD   Glucosamine-Chondroitin 750-600 MG CHEW Take  by mouth 2 times daily. Yes Historical Provider, MD   magnesium (MAGNESIUM-OXIDE) 250 MG TABS tablet Take 250 mg by mouth daily. Yes Historical Provider, MD   Coenzyme Q10 (COQ10) 50 MG CAPS Take  by mouth 2 times daily. Yes Historical Provider, MD   Flaxseed, Linseed, (FLAX SEED OIL) 1000 MG CAPS Take 1,000 mg by mouth daily. Yes Historical Provider, MD   Vitamin D 3 (CHOLECALCIFEROL) 1000 UNITS TABS tablet Take 1,000 Units by mouth daily. Yes Historical Provider, MD   aspirin 325 MG tablet Take 325 mg by mouth daily.    Yes Historical Provider, MD   fish oil-omega-3 fatty acids 1000 MG capsule Take 1 g by mouth daily. Yes Historical Provider, MD   therapeutic multivitamin-minerals (THERAGRAN-M) tablet Take 1 tablet by mouth daily. Yes Historical Provider, MD   vitamin E 400 UNIT capsule Take 400 Units by mouth daily. Yes Historical Provider, MD   Calcium Citrate-Vitamin D 200-250 MG-UNIT TABS Take  by mouth. Yes Historical Provider, MD   Ascorbic Acid (VITAMIN C) 500 MG tablet Take 1,000 mg by mouth daily. Yes Historical Provider, MD   folic acid (FOLVITE) 1 MG tablet Take 400 mcg by mouth daily  Yes Historical Provider, MD   Zinc 50 MG CAPS Take  by mouth.    Yes Historical Provider, MD         Past Medical History:   Diagnosis Date    CAD (coronary artery disease)     Cancer ([facility] [address] 2016    skin cancer    Carotid artery disease (HCC)     HTN (hypertension)     Hyperlipemia     Hyperlipidemia     LBP (low back pain)     Lung nodules     Osteoporosis     Seasonal allergies     Stress bladder incontinence, female     Unspecified cerebral artery occlusion with cerebral infarction     Wears glasses        Past Surgical History:   Procedure Laterality Date    CARDIAC CATHETERIZATION      COLONOSCOPY  8/13/2014    VARICOSE VEIN SURGERY           Family History   Problem Relation Age of Onset    Heart Disease Sister     Stroke Mother        CareTeam (Including outside providers/suppliers regularly involved in providing care):   Patient Care Team:  NED Eduardo CNP as PCP - General  Vilinda NED Little CNP as PCP - [facility] Empaneled Provider  Jose Noguera MD as PCP - Hematology/Oncology (Hematology and Oncology)  Srini Solis MD as Consulting Physician (Pulmonology)  Sue Fonseca MD as Consulting Physician (Hematology and Oncology)  Jack Little RN as Care Transitions Nurse  Rupert Cardoso RN as Care Transitions Nurse    Wt Readings from Last 3 Encounters:   08/03/20 100 lb (45.4 kg)   07/07/20 100 lb 2 oz times per week?: Yes  Have you lost any weight without trying in the past 3 months?: No  Do you eat fewer than 2 meals per day?: No  Have you seen a dentist within the past year?: Yes  Body mass index is 17.71 kg/m². Health Habits/Nutrition Interventions:  · Nutritional issues:  educational materials for healthy, well-balanced diet provided    Hearing/Vision:  No exam data present  Hearing/Vision  Do you or your family notice any trouble with your hearing?: (!) Yes  Do you have difficulty driving, watching TV, or doing any of your daily activities because of your eyesight?: No  Have you had an eye exam within the past year?: Yes  Hearing/Vision Interventions:  · Hearing concerns:  patient declines any further evaluation/treatment for hearing issues    Personalized Preventive Plan   Current Health Maintenance Status  Immunization History   Administered Date(s) Administered    Influenza 10/29/2012    Influenza Virus Vaccine 10/29/2003, 10/05/2005, 11/05/2007, 11/03/2008, 10/27/2010, 10/27/2010, 10/18/2011, 12/13/2011    Influenza, High Dose (Fluzone 65 yrs and older) 10/29/2014, 11/02/2015, 11/17/2017    Pneumococcal Conjugate 13-valent ([id number]) 05/10/2016    Pneumococcal Polysaccharide ([id number]) 11/11/2002    Zoster Live (Zostavax) 02/22/2010        Health Maintenance   Topic Date Due    DTaP/Tdap/Td vaccine (1 - Tdap) 05/04/1957    Shingles Vaccine (2 of 3) 04/19/2010    Annual Wellness Visit (AWV)  05/29/2019    Flu vaccine (1) 09/01/2020    Lipid screen  01/07/2021    Potassium monitoring  07/08/2021    Creatinine monitoring  07/08/2021    DEXA (modify frequency per FRAX score)  Completed    Hepatitis A vaccine  Aged Out    Hepatitis B vaccine  Aged Out    Hib vaccine  Aged Out    Meningococcal (ACWY) vaccine  Aged Out     Recommendations for [employer] Due: see orders and patient instructions/AVS.  .   Recommended screening schedule for the next 5-10 years is provided to the patient in written form: see Patient Instructions/AVS.    Diagnoses and all orders for this visit:    Routine general medical examination at a health care facility  Monitor. Update hearing test in future if she considers hearing aids. PAF (paroxysmal atrial fibrillation) ([facility])  Monitor. F/u cardiology and will ask them about repeating CT scans as noted in Dr Rodriguez's note from 7/2020    Overactive bladder  Monitor. May consider f/u Dr Lester Flores    Malnutrition, unspecified type ([address]  Enc higher protein intake. Depression, unspecified depression type  Start Cymbalta. May increase in future. Hope to see energy increase.

## 2020-08-05 ENCOUNTER — CARE COORDINATION (OUTPATIENT)
Dept: CASE MANAGEMENT | Age: 80
End: 2020-08-05

## 2020-08-05 NOTE — CARE COORDINATION
[address] [facility] Transitions Bundled Payments for Care Improvement (BPCI) Follow Up Call  Qualifying Diagnosis of Cardiac Arrhythmia    8/5/2020  Patient Name:  Clarisa Cooks   YOB: 1940  Discharge Date:  7/8/20  RARS:  Readmission Risk Score: 12    PCP:  NED Sofia CNP     Message left in compliance with HIPPA. Stated who I was, representing the [facility] SPECIALTY [facility], Care Transitions Team. Requested to place a call to their Physician with any immediate health needs/questions/concerns.      Future Appointments   Date Time Provider Antony Charlton   2/3/2021  2:45 PM NED Sofia CNP AND [facility]   8/9/2021  1:00 PM NED Sofia CNP AND [facility]       Care Transitions will continue to follow per [facility] , RN, CTN

## 2020-08-13 NOTE — CARE COORDINATION
[facility] Transitions Follow Up Call    2020    Patient: Kiara Knight  Patient : 1940   MRN: [id number]  Reason for Admission:   Discharge Date: 20 RARS: Readmission Risk Score: 12         Spoke with: Constantino Huff, patient    Care Transitions Subsequent and Final Call    Subsequent and Final Calls  Have your medications changed?:  No  Do you have any questions related to your medications?:  No  Do you currently have any active services?:  No  Do you have any needs or concerns that I can assist you with?:  No  Care Transitions Interventions  Other Interventions: Follow Up:  Contacted patient for BPCI-A follow up. Willy Wallace stated she is doing fine but not feeling 100% back to herself. She denies having any chest pain/discomfort, palpitations, elevated or rapid HR. She stated she is checking her BP every morning. BP running 120's/60's. HR running in the 50's. She stated she becomes lightheaded at times. She continues to monitor symptoms. Discussed when to contact MD with any new, worsening or persisting symptoms. She verbalized understanding. Willy Wallace stated she has been sick to her stomach. Has occasional nausea but no vomiting. She also has occasional abdominal pain. She thinks it may be from the Cymbalta which she started 2-3 weeks ago. She does not have an appetite. Encouraged to try Boost or Ensure supplement. CTN offered to contact PCP but patient declined. She stated she will contact PCP office herself. Patient very appreciative for follow up call. She does not have any other questions or concerns at this time. Will continue to follow.       Future Appointments   Date Time Provider Antony Charlton   2/3/2021  2:45 PM NED Llanes CNP AND [facility]   2021  1:00 PM NED Llanes CNP AND NADER Patricia RN

## 2020-08-19 NOTE — CARE COORDINATION
[facility] Transitions Follow Up Call    2020    Patient: Monica Gomes  Patient : 1940   MRN: [id number]  Reason for Admission:   Discharge Date: 20 RARS: Readmission Risk Score: 12         Spoke with: Krunal Taylorr  Patient reports she feels pretty good today. She says she thinks it is because she is no longer taking Cymbalta. She is able to eat. She denies nausea, vomiting,cp, palpitations lightheadedness, abdominal pain or rhr. No questions or concerns. Will continue to follow. Care Transitions Subsequent and Final Call    Subsequent and Final Calls  Care Transitions Interventions  Other Interventions:             Follow Up  Future Appointments   Date Time Provider Antony Charlton   2/3/2021  2:45 PM NED Ramsey CNP [facility] AND [facility]   2021  1:00 PM NED Ramsey CNP [address] [facility]       Nuvia Bhatia LPN

## 2020-08-27 NOTE — TELEPHONE ENCOUNTER
Reason for Disposition   Constant abdominal pain lasting > 2 hours    Answer Assessment - Initial Assessment Questions  1. LOCATION: \"Where does it hurt? \"       Lower ABD pain / RLQ  2. RADIATION: \"Does the pain shoot anywhere else? \" (e.g., chest, back)      Denies   3. ONSET: \"When did the pain begin? \" (e.g., minutes, hours or days ago)       Intermittent - worse today. 4. SUDDEN: \"Gradual or sudden onset? \"      Sudden  5. PATTERN \"Does the pain come and go, or is it constant? \"     - If constant: \"Is it getting better, staying the same, or worsening? \"       (Note: Constant means the pain never goes away completely; most serious pain is constant and it progresses)      - If intermittent: \"How long does it last?\" \"Do you have pain now? \"      (Note: Intermittent means the pain goes away completely between bouts)      Intermitent   6. SEVERITY: \"How bad is the pain? \"  (e.g., Scale 1-10; mild, moderate, or severe)    - MILD (1-3): doesn't interfere with normal activities, abdomen soft and not tender to touch     - MODERATE (4-7): interferes with normal activities or awakens from sleep, tender to touch     - SEVERE (8-10): excruciating pain, doubled over, unable to do any normal activities       Rates lower abd 7-8/10  7. RECURRENT SYMPTOM: \"Have you ever had this type of abdominal pain before? \" If so, ask: \"When was the last time? \" and \"What happened that time? \"       Happens intermittently. 8. CAUSE: \"What do you think is causing the abdominal pain? \"      Unsure  9. RELIEVING/AGGRAVATING FACTORS: \"What makes it better or worse? \" (e.g., movement, antacids, bowel movement)  Pt reports being uncomfortable. Fatigued. 10. OTHER SYMPTOMS: \"Has there been any vomiting, diarrhea, constipation, or urine problems? \"       Denies  11. PREGNANCY: \"Is there any chance you are pregnant? \" \"When was your last menstrual period? \"        N/A    Protocols used: ABDOMINAL PAIN - FEMALE-ADULT-[place]    Pt reports lower abdominal pain and in RLQ. Pt states has intermittently, but worse today. Rates pain 7-8/10. Reports regular BM. 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 Second level review with Xiang Patricia completed. States pt needs to go to ED. Explained to pt that she needs to go to ED. Pt is not agreeable and refused to go to ED. States she would like to see PCP as soon as available. Encouraged pt to go to ED if no appt was available today at PCP, also explained that PCP may instruct pt to go to ED. Warm transfer to [place] at [address]. Caller provided care advice and instructed to call back with worsening symptoms. Please do not respond to the triage nurse through this encounter. Any subsequent communication should be directly with the patient.

## 2020-08-28 NOTE — PATIENT INSTRUCTIONS
Take macrobid twice a day for 7 days to clear urinary tract infection  Make appointment with urology as discussed  Make an appointment with GI for evaluation of swallowing and difficulty in eating

## 2020-08-28 NOTE — PROGRESS NOTES
08/28/20    Claudio Mei  78 y.o.  female    Chief Complaint   Patient presents with    Abdominal Pain         HPI:   Pt presents today with complaint of foul smelling urine, flank and lower abd discomfort and feeling sick to her stomach. She was unable to get an appointment with urology due to [place]. /78   Pulse 55   Temp 97.3 °F (36.3 °C)   Wt 98 lb (44.5 kg)   SpO2 98%   BMI 17.36 kg/m²        Current Outpatient Medications   Medication Sig Dispense Refill    nitrofurantoin, macrocrystal-monohydrate, (MACROBID) 100 MG capsule Take 1 capsule by mouth 2 times daily for 7 days 14 capsule 0    spironolactone (ALDACTONE) 25 MG tablet Take 25 mg by mouth daily      apixaban (ELIQUIS) 2.5 MG TABS tablet Take 1 tablet by mouth 2 times daily 60 tablet 0    metoprolol succinate (TOPROL XL) 100 MG extended release tablet Take 1 tablet by mouth daily 30 tablet 0    simvastatin (ZOCOR) 20 MG tablet TAKE ONE TABLET BY MOUTH DAILY 90 tablet 3    acetaminophen (TYLENOL) 500 MG tablet Take 500 mg by mouth every 6 hours as needed for Pain      Glucosamine-Chondroitin 750-600 MG CHEW Take  by mouth 2 times daily.  magnesium (MAGNESIUM-OXIDE) 250 MG TABS tablet Take 250 mg by mouth daily.  Coenzyme Q10 (COQ10) 50 MG CAPS Take  by mouth 2 times daily.  Flaxseed, Linseed, (FLAX SEED OIL) 1000 MG CAPS Take 1,000 mg by mouth daily.  Vitamin D 3 (CHOLECALCIFEROL) 1000 UNITS TABS tablet Take 1,000 Units by mouth daily.  aspirin 325 MG tablet Take 325 mg by mouth daily.  fish oil-omega-3 fatty acids 1000 MG capsule Take 1 g by mouth daily.  therapeutic multivitamin-minerals (THERAGRAN-M) tablet Take 1 tablet by mouth daily.  vitamin E 400 UNIT capsule Take 400 Units by mouth daily.  Calcium Citrate-Vitamin D 200-250 MG-UNIT TABS Take  by mouth.  Ascorbic Acid (VITAMIN C) 500 MG tablet Take 1,000 mg by mouth daily.         folic acid (FOLVITE) 1 MG tablet Take 400 mcg by mouth daily       Zinc 50 MG CAPS Take  by mouth. No current facility-administered medications for this visit. Social History     Socioeconomic History    Marital status:       Spouse name: Not on file    Number of children: Not on file    Years of education: Not on file    Highest education level: Not on file   Occupational History    Not on file   Social Needs    Financial resource strain: Not on file    Food insecurity     Worry: Not on file     Inability: Not on file    Transportation needs     Medical: Not on file     Non-medical: Not on file   Tobacco Use    Smoking status: Never Smoker    Smokeless tobacco: Never Used   Substance and Sexual Activity    Alcohol use: No    Drug use: No    Sexual activity: Not Currently   Lifestyle    Physical activity     Days per week: Not on file     Minutes per session: Not on file    Stress: Not on file   Relationships    Social connections     Talks on phone: Not on file     Gets together: Not on file     Attends [employer] service: Not on file     Active member of club or organization: Not on file     Attends meetings of clubs or organizations: Not on file     Relationship status: Not on file    Intimate partner violence     Fear of current or ex partner: Not on file     Emotionally abused: Not on file     Physically abused: Not on file     Forced sexual activity: Not on file   Other Topics Concern    Not on file   Social History Narrative    Not on file       Family History   Problem Relation Age of Onset    Heart Disease Sister     Stroke Mother        Past Medical History:   Diagnosis Date    CAD (coronary artery disease)     Cancer ([facility] [facility] 2016    skin cancer    Carotid artery disease ([facility]     HTN (hypertension)     Hyperlipemia     Hyperlipidemia     LBP (low back pain)     Lung nodules     Osteoporosis     Seasonal allergies     Stress bladder incontinence, female     Unspecified cerebral artery occlusion with cerebral infarction     Wears glasses        Review of Systems   Constitutional: Negative for fever. HENT: Negative for congestion. Eyes: Negative for discharge. Respiratory: Negative for cough, shortness of breath and wheezing. Cardiovascular: Negative for chest pain, palpitations and leg swelling. Gastrointestinal: Negative for abdominal pain, blood in stool, constipation, diarrhea, nausea and vomiting. See HPI   Genitourinary: Positive for dysuria and frequency. Negative for hematuria. Musculoskeletal: Negative for myalgias. Skin: Negative for rash. Neurological: Negative for dizziness. Psychiatric/Behavioral: The patient is not nervous/anxious. Physical Exam  Constitutional:       General: She is not in acute distress. Appearance: She is not diaphoretic. Comments: Very thin, frail appearing   HENT:      Right Ear: External ear normal.      Left Ear: External ear normal.      Mouth/Throat:      Pharynx: No oropharyngeal exudate. Eyes:      General: No scleral icterus. Right eye: No discharge. Left eye: No discharge. Neck:      Musculoskeletal: Normal range of motion. Thyroid: No thyromegaly. Cardiovascular:      Rate and Rhythm: Normal rate and regular rhythm. Heart sounds: Normal heart sounds. No murmur. No friction rub. No gallop. Pulmonary:      Effort: Pulmonary effort is normal.      Breath sounds: Normal breath sounds. No wheezing. Abdominal:      General: Bowel sounds are normal. There is no distension. Palpations: Abdomen is soft. Tenderness: There is no abdominal tenderness. Genitourinary:     Comments: UA +2 leuk; +nitrite; +protein, +ketones  Musculoskeletal: Normal range of motion. General: No tenderness. Lymphadenopathy:      Cervical: No cervical adenopathy. Skin:     General: Skin is warm and dry. Findings: No erythema or rash.    Neurological:      Mental Status: She is

## 2020-09-01 NOTE — CARE COORDINATION
[facility] Transitions Follow Up Call    2020    Patient: Rubina Oliver  Patient : 1940   MRN: <[id number]>  Reason for Admission:   Discharge Date: 20 RARS: Readmission Risk Score: 12         Spoke with: Attempted to contact patient for follow up BPCI-A transition call. Left message on Voice mail to call office (number given) with any questions and an update on your condition since discharge. Will Follow up at later time. Alvah Ormond, LPN     [facility] / [address] [place] [address] Transitions Subsequent and Final Call    Subsequent and Final Calls  Care Transitions Interventions  Other Interventions:             Follow Up  Future Appointments   Date Time Provider Antony Charlton   2/3/2021  2:45 PM NED Hickman C[facility]   2021  1:00 PM NED Hickman [facility] Alvah Ormond, LPN

## 2020-09-16 NOTE — CARE COORDINATION
[facility] Transitions Follow Up Call    2020    Patient: Pedro Stover  Patient : 1940   MRN: [id number]  Reason for Admission:   Discharge Date: 20 RARS: Readmission Risk Score: 12         Spoke with: Alfredo Samuel, patient    Care Transitions Subsequent and Final Call    Subsequent and Final Calls  Do you have any ongoing symptoms?:  No  Have your medications changed?:  No  Do you have any questions related to your medications?:  No  Do you currently have any active services?:  No  Do you have any needs or concerns that I can assist you with?:  No  Identified Barriers:  None  Care Transitions Interventions  Other Interventions: Follow Up:  Contacted patient for BPCI-A follow up. Spoke very briefly with patient who was in a hurry to end the call. [employer] stated she is doing just fine. She stated everything was wonderful and they took care of her AFIB. She stated she has everything that she needs. Thanked CTN for calling and call was disconnected. Will continue to follow.       Future Appointments   Date Time Provider Antony Charlton   2/3/2021  2:45 PM NED Gay [facility]   2021  1:00 PM NED Gay [facility] Thornton Bernheim, RN

## 2020-10-01 NOTE — TELEPHONE ENCOUNTER
Refill request for zocor medication.      [facility]      Last visit - 8-28-20     Pending visit - 2-3-21    Last refill -7-14-20      Medication Contract signed -   Last Farrukh Valencia ran-         Additional Comments

## 2020-10-06 NOTE — CARE COORDINATION
[facility] Transitions Follow Up Call    10/6/2020    Patient: Red Hernandez  Patient : 1940   MRN: [id number]  Reason for Admission:   Discharge Date: 20 RARS: Readmission Risk Score: 12         Spoke with: Rea Gardiner, patient    Care Transitions Subsequent and Final Call    Subsequent and Final Calls  Have your medications changed?:  No  Do you have any questions related to your medications?:  No  Do you currently have any active services?:  No  Do you have any needs or concerns that I can assist you with?:  No  Identified Barriers:  None  Care Transitions Interventions  Other Interventions: Follow Up:  Contacted patient for final BPCI-A follow up. Spoke very briefly with patient. She stated everything has been fine. Denies having any issues or concerns. She stated she is aware of when to contact her MD. She reports she is still following up with cardiology. Informed her that this will be the final BPCI-A follow up. No questions or concerns. She thanked CTN for calling and call was disconnected.       Future Appointments   Date Time Provider Antony Charlton   2/3/2021  2:45 PM NED Ross [facility]   2021  1:00 PM NED Ross CNP [facility] Thorne RN

## 2020-10-26 NOTE — TELEPHONE ENCOUNTER
Let's have her take pictures tonight and have her son send through [employer] if possible. Then I will assess and treat as appropriate.

## 2020-10-26 NOTE — TELEPHONE ENCOUNTER
Reviewing the pictures that were emailed. Suspect shingles. There are two medications I would like her to take to decrease the intensity of shingles and hopefully decrease the length of time she has symptoms. I'd like an update on Wednesday so I can decide if we need to add one more medication. (Gabapentin)    Sent rx's.

## 2020-10-26 NOTE — TELEPHONE ENCOUNTER
Spoke with son, he is going to take a few pictures and will email them to me. I will forward them to Leydi.

## 2020-10-26 NOTE — TELEPHONE ENCOUNTER
Patient c/o pain in middle of chest around R shoulder blade for some time, now has red rash in that same area. Pt is wondering if this could be Shingles. Would like ov. No openings in your schedule. Please advise. Patient can be reached at [phone].

## 2020-10-26 NOTE — TELEPHONE ENCOUNTER
She only has a laptop and would need her son to help.  Do you see any other spot you could put her in ?

## 2020-10-28 NOTE — TELEPHONE ENCOUNTER
Per pt shingles, she does have some relief and it has not spread. She is very grateful for your help.

## 2020-11-02 NOTE — TELEPHONE ENCOUNTER
Refill request for valacyclovir medication. Name of Mo Hu      Last visit - 8/28/2020     Pending visit - 2/3/2021    Last refill -10/26/2020  0 refills      Patient still has the shingles and wants to know if you want to send in a refill for her. Pended medication.

## 2020-11-02 NOTE — TELEPHONE ENCOUNTER
Patient said she will just wait it out and see how she feels later down the road.   She said it is starting to clear up and will let us know if it doesn't completely heal.

## 2020-11-02 NOTE — TELEPHONE ENCOUNTER
Patient called back, she said the pain is still there although she said it has gotten a little better. She just wasn't sure what the protocols were for shingles.

## 2020-11-02 NOTE — TELEPHONE ENCOUNTER
If the pain is bothersome, I can prescribe a medication as I discussed earlier with her that is called gabapentin. Let me know if she would like me to prescribe this medication.

## 2020-11-04 NOTE — TELEPHONE ENCOUNTER
I have sent Gabapentin to [facility]. She can take 1, 2 or 3 capsules before bed. Start with 1 and increase to 3 if needed. May cause some drowsiness. She can slowly wean off medication over the next few weeks as symptoms improve. Would recommend a 15 min appt in the next month or so to review neuropathy from shingles.

## 2020-11-30 NOTE — PROGRESS NOTES
Dhruv Tang  1940      HPI:  Chief Complaint   Patient presents with    Herpes Zoster     pain is still present , is getting better.  Anorexia       Shingles pain x 1 month R abdomen and radiates to the R side of back. She feels moderate pain/neuropathy  Gabapentin 200mg at bedtime, helpful some but does not help daytime. Some nausea. Less activity. Weight loss. Appetite decreased. Trying to eat 3 meals a day. /68 (Site: Left Upper Arm, Position: Sitting, Cuff Size: Medium Adult)   Pulse 89   Temp 97.2 °F (36.2 °C) (Temporal)   Wt 99 lb (44.9 kg)   SpO2 96%   BMI 17.54 kg/m²     Prior to Visit Medications    Medication Sig Taking? Authorizing Provider   gabapentin (NEURONTIN) 100 MG capsule Take 1-3 capsules up to three times a day as needed, intended supply: 30 days Yes NED Manuel - CNP   simvastatin (ZOCOR) 20 MG tablet TAKE ONE TABLET BY MOUTH DAILY Yes NED Manuel - CNP   spironolactone (ALDACTONE) 25 MG tablet Take 25 mg by mouth daily Yes Historical Provider, MD   apixaban (ELIQUIS) 2.5 MG TABS tablet Take 1 tablet by mouth 2 times daily Yes Palma Patricia MD   metoprolol succinate (TOPROL XL) 100 MG extended release tablet Take 1 tablet by mouth daily Yes Palma Patricia MD   glucosamine-chondroitin 500-400 MG tablet Take by mouth 2 times daily  Yes Historical Provider, MD   magnesium (MAGNESIUM-OXIDE) 250 MG TABS tablet Take 250 mg by mouth daily. Yes Historical Provider, MD   Coenzyme Q10 (COQ10) 50 MG CAPS Take  by mouth 2 times daily. Yes Historical Provider, MD   vitamin D 25 MCG (1000 UT) CAPS Take 1,000 Units by mouth daily  Yes Historical Provider, MD   aspirin 81 MG chewable tablet Take 325 mg by mouth daily  Yes Historical Provider, MD   therapeutic multivitamin-minerals (THERAGRAN-M) tablet Take 1 tablet by mouth daily. Yes Historical Provider, MD   vitamin E 400 UNIT capsule Take 400 Units by mouth daily.    Yes Historical Provider, MD Calcium Citrate-Vitamin D 200-250 MG-UNIT TABS Take  by mouth. Yes Historical Provider, MD   folic acid (FOLVITE) 1 MG tablet Take 400 mcg by mouth daily  Yes Historical Provider, MD   Zinc 50 MG CAPS Take  by mouth. Yes Historical Provider, MD   acetaminophen (TYLENOL) 500 MG tablet Take 500 mg by mouth every 6 hours as needed for Pain  Historical Provider, MD   Ascorbic Acid (VITAMIN C) 500 MG tablet Take 1,000 mg by mouth daily. Historical Provider, MD     Family History   Problem Relation Age of Onset    Heart Disease Sister     Stroke Mother      Social History     Socioeconomic History    Marital status:       Spouse name: Not on file    Number of children: Not on file    Years of education: Not on file    Highest education level: Not on file   Occupational History    Not on file   Social Needs    Financial resource strain: Not on file    Food insecurity     Worry: Not on file     Inability: Not on file    Transportation needs     Medical: Not on file     Non-medical: Not on file   Tobacco Use    Smoking status: Never Smoker    Smokeless tobacco: Never Used   Substance and Sexual Activity    Alcohol use: No    Drug use: No    Sexual activity: Not Currently   Lifestyle    Physical activity     Days per week: Not on file     Minutes per session: Not on file    Stress: Not on file   Relationships    Social connections     Talks on phone: Not on file     Gets together: Not on file     Attends Mu-ism service: Not on file     Active member of club or organization: Not on file     Attends meetings of clubs or organizations: Not on file     Relationship status: Not on file    Intimate partner violence     Fear of current or ex partner: Not on file     Emotionally abused: Not on file     Physically abused: Not on file     Forced sexual activity: Not on file   Other Topics Concern    Not on file   Social History Narrative    Not on file       Review of Systems   Constitutional: Positive for unexpected weight change. Negative for appetite change, chills, fatigue and fever. HENT: Negative for congestion, ear discharge, ear pain, facial swelling, hearing loss, sinus pressure, sneezing and sore throat. Respiratory: Negative for cough. Cardiovascular: Negative for chest pain. Gastrointestinal: Negative for diarrhea, nausea and vomiting. Genitourinary: Negative for difficulty urinating, dysuria, hematuria and urgency. Musculoskeletal: Negative for arthralgias and gait problem. Neurological: Negative for dizziness, weakness and headaches. Neuropathic pain   Hematological: Negative for adenopathy. Psychiatric/Behavioral: Negative for sleep disturbance and suicidal ideas. Physical Exam  Vitals signs reviewed. HENT:      Head: Normocephalic. Cardiovascular:      Rate and Rhythm: Normal rate and regular rhythm. Pulses: Normal pulses. Heart sounds: Normal heart sounds. Pulmonary:      Effort: Pulmonary effort is normal.      Breath sounds: Normal breath sounds. Musculoskeletal:      Right lower leg: No edema. Left lower leg: No edema. Skin:     Comments: Residual erythematous lesions noted under R breast extending to R posterior aspect of back. Neurological:      General: No focal deficit present. Mental Status: She is alert and oriented to person, place, and time. Assessment:     1. 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 Reviewed importance to monitor drowsiness    2. Weight loss  Stressed importance of increasing protein in diet. Enc to use Boost/Ensure and provided samples today in office. Plan:    See above plan. Return in about 4 weeks (around 12/28/2020) for IO, shingles f/u 30 min appt.     NED Torres - CNP

## 2020-12-17 NOTE — TELEPHONE ENCOUNTER
Refill request for gabapentin medication.      [facility]      Last visit - 11-30-20     Pending visit - 1-4-21    Last refill -11-30-20      Medication Contract signed -none on file   Last Oarrs ran-         Additional Comments

## 2021-01-01 ENCOUNTER — HOSPITAL ENCOUNTER (OUTPATIENT)
Dept: CT IMAGING | Age: 81
Discharge: HOME | End: 2021-04-07
Payer: MEDICARE

## 2021-01-01 ENCOUNTER — OFFICE VISIT (OUTPATIENT)
Dept: INTERNAL MEDICINE CLINIC | Age: 81
End: 2021-01-01
Payer: MEDICARE

## 2021-01-01 ENCOUNTER — TELEPHONE (OUTPATIENT)
Dept: INTERNAL MEDICINE CLINIC | Age: 81
End: 2021-01-01

## 2021-01-01 ENCOUNTER — APPOINTMENT (OUTPATIENT)
Dept: GENERAL RADIOLOGY | Age: 81
DRG: 350 | End: 2021-01-01
Payer: MEDICARE

## 2021-01-01 ENCOUNTER — CARE COORDINATION (OUTPATIENT)
Dept: CASE MANAGEMENT | Age: 81
End: 2021-01-01

## 2021-01-01 ENCOUNTER — HOSPITAL ENCOUNTER (INPATIENT)
Age: 81
LOS: 6 days | Discharge: HOSPICE | DRG: 391 | End: 2021-07-13
Attending: EMERGENCY MEDICINE | Admitting: INTERNAL MEDICINE
Payer: MEDICARE

## 2021-01-01 ENCOUNTER — APPOINTMENT (OUTPATIENT)
Dept: GENERAL RADIOLOGY | Age: 81
DRG: 391 | End: 2021-01-01
Payer: MEDICARE

## 2021-01-01 ENCOUNTER — APPOINTMENT (OUTPATIENT)
Dept: ULTRASOUND IMAGING | Age: 81
DRG: 391 | End: 2021-01-01
Payer: MEDICARE

## 2021-01-01 ENCOUNTER — ANESTHESIA (OUTPATIENT)
Dept: OPERATING ROOM | Age: 81
DRG: 350 | End: 2021-01-01
Payer: MEDICARE

## 2021-01-01 ENCOUNTER — HOSPITAL ENCOUNTER (INPATIENT)
Age: 81
LOS: 6 days | Discharge: HOSPICE | DRG: 640 | End: 2021-07-23
Attending: EMERGENCY MEDICINE | Admitting: INTERNAL MEDICINE
Payer: MEDICARE

## 2021-01-01 ENCOUNTER — TELEPHONE (OUTPATIENT)
Dept: SURGERY | Age: 81
End: 2021-01-01

## 2021-01-01 ENCOUNTER — APPOINTMENT (OUTPATIENT)
Dept: GENERAL RADIOLOGY | Age: 81
DRG: 640 | End: 2021-01-01
Payer: MEDICARE

## 2021-01-01 ENCOUNTER — ANESTHESIA EVENT (OUTPATIENT)
Dept: OPERATING ROOM | Age: 81
DRG: 350 | End: 2021-01-01
Payer: MEDICARE

## 2021-01-01 ENCOUNTER — APPOINTMENT (OUTPATIENT)
Dept: CT IMAGING | Age: 81
DRG: 391 | End: 2021-01-01
Payer: MEDICARE

## 2021-01-01 ENCOUNTER — APPOINTMENT (OUTPATIENT)
Dept: CT IMAGING | Age: 81
DRG: 350 | End: 2021-01-01
Payer: MEDICARE

## 2021-01-01 ENCOUNTER — HOSPITAL ENCOUNTER (OUTPATIENT)
Age: 81
Discharge: HOME | End: 2021-04-07
Payer: MEDICARE

## 2021-01-01 ENCOUNTER — HOSPITAL ENCOUNTER (INPATIENT)
Age: 81
LOS: 7 days | Discharge: NURSING, REHAB OR LONG-TERM CARE | DRG: 350 | End: 2021-06-13
Attending: EMERGENCY MEDICINE | Admitting: HOSPITALIST
Payer: MEDICARE

## 2021-01-01 VITALS
DIASTOLIC BLOOD PRESSURE: 94 MMHG | OXYGEN SATURATION: 94 % | TEMPERATURE: 98 F | RESPIRATION RATE: 18 BRPM | HEIGHT: 63 IN | WEIGHT: 90.9 LBS | HEART RATE: 87 BPM | BODY MASS INDEX: 16.11 KG/M2 | SYSTOLIC BLOOD PRESSURE: 156 MMHG

## 2021-01-01 VITALS
RESPIRATION RATE: 16 BRPM | WEIGHT: 87.13 LBS | SYSTOLIC BLOOD PRESSURE: 154 MMHG | TEMPERATURE: 97.9 F | HEART RATE: 75 BPM | DIASTOLIC BLOOD PRESSURE: 99 MMHG | BODY MASS INDEX: 16.03 KG/M2 | OXYGEN SATURATION: 96 % | HEIGHT: 62 IN

## 2021-01-01 VITALS
SYSTOLIC BLOOD PRESSURE: 130 MMHG | HEART RATE: 88 BPM | BODY MASS INDEX: 17.18 KG/M2 | WEIGHT: 97 LBS | OXYGEN SATURATION: 98 % | DIASTOLIC BLOOD PRESSURE: 68 MMHG | TEMPERATURE: 98 F

## 2021-01-01 VITALS
WEIGHT: 98 LBS | SYSTOLIC BLOOD PRESSURE: 138 MMHG | BODY MASS INDEX: 17.36 KG/M2 | OXYGEN SATURATION: 97 % | TEMPERATURE: 98.1 F | DIASTOLIC BLOOD PRESSURE: 84 MMHG | HEART RATE: 78 BPM

## 2021-01-01 VITALS
DIASTOLIC BLOOD PRESSURE: 86 MMHG | SYSTOLIC BLOOD PRESSURE: 180 MMHG | RESPIRATION RATE: 1 BRPM | OXYGEN SATURATION: 100 %

## 2021-01-01 VITALS
RESPIRATION RATE: 18 BRPM | HEART RATE: 105 BPM | TEMPERATURE: 98.1 F | WEIGHT: 83.33 LBS | HEIGHT: 63 IN | OXYGEN SATURATION: 95 % | BODY MASS INDEX: 14.77 KG/M2 | DIASTOLIC BLOOD PRESSURE: 88 MMHG | SYSTOLIC BLOOD PRESSURE: 129 MMHG

## 2021-01-01 DIAGNOSIS — R10.31 RIGHT LOWER QUADRANT ABDOMINAL PAIN: ICD-10-CM

## 2021-01-01 DIAGNOSIS — I10 ESSENTIAL HYPERTENSION: ICD-10-CM

## 2021-01-01 DIAGNOSIS — R10.32 ABDOMINAL PAIN, ACUTE, BILATERAL LOWER QUADRANT: ICD-10-CM

## 2021-01-01 DIAGNOSIS — R62.7 FAILURE TO THRIVE IN ADULT: ICD-10-CM

## 2021-01-01 DIAGNOSIS — K30 INDIGESTION: ICD-10-CM

## 2021-01-01 DIAGNOSIS — K40.30 INCARCERATED RIGHT INGUINAL HERNIA: Primary | ICD-10-CM

## 2021-01-01 DIAGNOSIS — I10 UNCONTROLLED HYPERTENSION: ICD-10-CM

## 2021-01-01 DIAGNOSIS — R63.4 WEIGHT LOSS: ICD-10-CM

## 2021-01-01 DIAGNOSIS — R13.10 DYSPHAGIA, UNSPECIFIED TYPE: ICD-10-CM

## 2021-01-01 DIAGNOSIS — B02.23 NEUROPATHY DUE TO HERPES ZOSTER: Primary | ICD-10-CM

## 2021-01-01 DIAGNOSIS — E78.2 MIXED HYPERLIPIDEMIA: ICD-10-CM

## 2021-01-01 DIAGNOSIS — E46 PROTEIN-CALORIE MALNUTRITION, UNSPECIFIED SEVERITY (HCC): Primary | ICD-10-CM

## 2021-01-01 DIAGNOSIS — R53.1 WEAKNESS: Primary | ICD-10-CM

## 2021-01-01 DIAGNOSIS — R11.0 NAUSEA: ICD-10-CM

## 2021-01-01 DIAGNOSIS — R10.31 ABDOMINAL PAIN, ACUTE, BILATERAL LOWER QUADRANT: ICD-10-CM

## 2021-01-01 DIAGNOSIS — N39.3 STRESS BLADDER INCONTINENCE, FEMALE: ICD-10-CM

## 2021-01-01 DIAGNOSIS — E46 MALNUTRITION, UNSPECIFIED TYPE (HCC): ICD-10-CM

## 2021-01-01 DIAGNOSIS — R62.7 FAILURE TO THRIVE IN ADULT: Primary | ICD-10-CM

## 2021-01-01 DIAGNOSIS — K56.609 SBO (SMALL BOWEL OBSTRUCTION) (HCC): Primary | ICD-10-CM

## 2021-01-01 DIAGNOSIS — K59.00 CONSTIPATION, UNSPECIFIED CONSTIPATION TYPE: Primary | ICD-10-CM

## 2021-01-01 LAB
A/G RATIO: 0.9 (ref 1.1–2.2)
A/G RATIO: 1 (ref 1.1–2.2)
ABO/RH: NORMAL
ALBUMIN SERPL-MCNC: 2.9 G/DL (ref 3.4–5)
ALBUMIN SERPL-MCNC: 2.9 G/DL (ref 3.4–5)
ALBUMIN SERPL-MCNC: 3 G/DL (ref 3.4–5)
ALBUMIN SERPL-MCNC: 3.2 G/DL (ref 3.4–5)
ALBUMIN SERPL-MCNC: 3.3 G/DL (ref 3.4–5)
ALBUMIN SERPL-MCNC: 3.7 G/DL (ref 3.4–5)
ALBUMIN SERPL-MCNC: 3.9 G/DL (ref 3.4–5)
ALP BLD-CCNC: 101 U/L (ref 40–129)
ALP BLD-CCNC: 86 U/L (ref 40–129)
ALP BLD-CCNC: 86 U/L (ref 40–129)
ALP BLD-CCNC: 94 U/L (ref 40–129)
ALT SERPL-CCNC: 10 U/L (ref 10–40)
ALT SERPL-CCNC: 10 U/L (ref 10–40)
ALT SERPL-CCNC: 12 U/L (ref 10–40)
ALT SERPL-CCNC: 8 U/L (ref 10–40)
AMORPHOUS: ABNORMAL /HPF
ANION GAP SERPL CALCULATED.3IONS-SCNC: 10 MMOL/L (ref 3–16)
ANION GAP SERPL CALCULATED.3IONS-SCNC: 11 MMOL/L (ref 3–16)
ANION GAP SERPL CALCULATED.3IONS-SCNC: 12 MMOL/L (ref 3–16)
ANION GAP SERPL CALCULATED.3IONS-SCNC: 15 MMOL/L (ref 3–16)
ANION GAP SERPL CALCULATED.3IONS-SCNC: 4 MMOL/L (ref 3–16)
ANION GAP SERPL CALCULATED.3IONS-SCNC: 7 MMOL/L (ref 3–16)
ANION GAP SERPL CALCULATED.3IONS-SCNC: 8 MMOL/L (ref 3–16)
ANION GAP SERPL CALCULATED.3IONS-SCNC: 9 MMOL/L (ref 3–16)
ANTIBODY SCREEN: NORMAL
APTT: 27.2 SEC (ref 24.2–36.2)
APTT: 30.4 SEC (ref 24.2–36.2)
APTT: 30.5 SEC (ref 24.2–36.2)
APTT: 30.9 SEC (ref 24.2–36.2)
APTT: 33.4 SEC (ref 24.2–36.2)
APTT: 36 SEC (ref 24.2–36.2)
APTT: 45.2 SEC (ref 24.2–36.2)
APTT: 47.2 SEC (ref 24.2–36.2)
APTT: 50.1 SEC (ref 24.2–36.2)
APTT: 53.8 SEC (ref 24.2–36.2)
APTT: 54.3 SEC (ref 24.2–36.2)
AST SERPL-CCNC: 17 U/L (ref 15–37)
AST SERPL-CCNC: 19 U/L (ref 15–37)
AST SERPL-CCNC: 21 U/L (ref 15–37)
AST SERPL-CCNC: 26 U/L (ref 15–37)
BASOPHILS ABSOLUTE: 0 K/UL (ref 0–0.2)
BASOPHILS ABSOLUTE: 0.1 K/UL (ref 0–0.2)
BASOPHILS RELATIVE PERCENT: 0.4 %
BASOPHILS RELATIVE PERCENT: 0.4 %
BASOPHILS RELATIVE PERCENT: 0.5 %
BASOPHILS RELATIVE PERCENT: 0.7 %
BASOPHILS RELATIVE PERCENT: 0.9 %
BILIRUB SERPL-MCNC: 0.3 MG/DL (ref 0–1)
BILIRUB SERPL-MCNC: 0.4 MG/DL (ref 0–1)
BILIRUB SERPL-MCNC: 0.4 MG/DL (ref 0–1)
BILIRUB SERPL-MCNC: 0.5 MG/DL (ref 0–1)
BILIRUBIN URINE: NEGATIVE
BILIRUBIN, POC: NORMAL
BLOOD CULTURE, ROUTINE: NORMAL
BLOOD URINE, POC: NORMAL
BLOOD, URINE: NEGATIVE
BUN BLDV-MCNC: 10 MG/DL (ref 7–20)
BUN BLDV-MCNC: 13 MG/DL (ref 7–20)
BUN BLDV-MCNC: 13 MG/DL (ref 7–20)
BUN BLDV-MCNC: 14 MG/DL (ref 7–20)
BUN BLDV-MCNC: 15 MG/DL (ref 7–20)
BUN BLDV-MCNC: 15 MG/DL (ref 7–20)
BUN BLDV-MCNC: 18 MG/DL (ref 7–20)
BUN BLDV-MCNC: 19 MG/DL (ref 7–20)
BUN BLDV-MCNC: 23 MG/DL (ref 7–20)
BUN BLDV-MCNC: 24 MG/DL (ref 7–20)
BUN BLDV-MCNC: 24 MG/DL (ref 7–20)
BUN BLDV-MCNC: 26 MG/DL (ref 7–20)
BUN BLDV-MCNC: 26 MG/DL (ref 7–20)
CALCIUM SERPL-MCNC: 10.1 MG/DL (ref 8.3–10.6)
CALCIUM SERPL-MCNC: 10.3 MG/DL (ref 8.3–10.6)
CALCIUM SERPL-MCNC: 10.3 MG/DL (ref 8.3–10.6)
CALCIUM SERPL-MCNC: 10.4 MG/DL (ref 8.3–10.6)
CALCIUM SERPL-MCNC: 10.4 MG/DL (ref 8.3–10.6)
CALCIUM SERPL-MCNC: 10.5 MG/DL (ref 8.3–10.6)
CALCIUM SERPL-MCNC: 10.6 MG/DL (ref 8.3–10.6)
CALCIUM SERPL-MCNC: 8.7 MG/DL (ref 8.3–10.6)
CALCIUM SERPL-MCNC: 9.3 MG/DL (ref 8.3–10.6)
CALCIUM SERPL-MCNC: 9.4 MG/DL (ref 8.3–10.6)
CALCIUM SERPL-MCNC: 9.4 MG/DL (ref 8.3–10.6)
CALCIUM SERPL-MCNC: 9.6 MG/DL (ref 8.3–10.6)
CALCIUM SERPL-MCNC: 9.7 MG/DL (ref 8.3–10.6)
CALCIUM SERPL-MCNC: 9.7 MG/DL (ref 8.3–10.6)
CALCIUM SERPL-MCNC: 9.8 MG/DL (ref 8.3–10.6)
CALCIUM SERPL-MCNC: 9.9 MG/DL (ref 8.3–10.6)
CHLORIDE BLD-SCNC: 100 MMOL/L (ref 99–110)
CHLORIDE BLD-SCNC: 100 MMOL/L (ref 99–110)
CHLORIDE BLD-SCNC: 101 MMOL/L (ref 99–110)
CHLORIDE BLD-SCNC: 102 MMOL/L (ref 99–110)
CHLORIDE BLD-SCNC: 103 MMOL/L (ref 99–110)
CHLORIDE BLD-SCNC: 105 MMOL/L (ref 99–110)
CHLORIDE BLD-SCNC: 105 MMOL/L (ref 99–110)
CHLORIDE BLD-SCNC: 108 MMOL/L (ref 99–110)
CHLORIDE BLD-SCNC: 97 MMOL/L (ref 99–110)
CHLORIDE BLD-SCNC: 99 MMOL/L (ref 99–110)
CLARITY, POC: CLEAR
CLARITY: CLEAR
CO-ENZYME Q10: 0.5 MG/L (ref 0.4–1.6)
CO2: 24 MMOL/L (ref 21–32)
CO2: 25 MMOL/L (ref 21–32)
CO2: 26 MMOL/L (ref 21–32)
CO2: 27 MMOL/L (ref 21–32)
CO2: 27 MMOL/L (ref 21–32)
CO2: 28 MMOL/L (ref 21–32)
CO2: 29 MMOL/L (ref 21–32)
CO2: 30 MMOL/L (ref 21–32)
CO2: 30 MMOL/L (ref 21–32)
CO2: 32 MMOL/L (ref 21–32)
COLOR, POC: YELLOW
COLOR: YELLOW
CREAT SERPL-MCNC: 0.6 MG/DL (ref 0.6–1.2)
CREAT SERPL-MCNC: 0.6 MG/DL (ref 0.6–1.2)
CREAT SERPL-MCNC: 0.7 MG/DL (ref 0.6–1.2)
CREAT SERPL-MCNC: 0.8 MG/DL (ref 0.6–1.2)
CREAT SERPL-MCNC: 0.9 MG/DL (ref 0.6–1.2)
CREAT SERPL-MCNC: 1.1 MG/DL (ref 0.6–1.2)
CULTURE, BLOOD 2: NORMAL
D DIMER: <200 NG/ML DDU (ref 0–229)
EKG ATRIAL RATE: 0 BPM
EKG ATRIAL RATE: 74 BPM
EKG ATRIAL RATE: 78 BPM
EKG DIAGNOSIS: NORMAL
EKG P AXIS: 26 DEGREES
EKG P AXIS: 65 DEGREES
EKG P-R INTERVAL: 138 MS
EKG P-R INTERVAL: 172 MS
EKG Q-T INTERVAL: 308 MS
EKG Q-T INTERVAL: 412 MS
EKG Q-T INTERVAL: 428 MS
EKG QRS DURATION: 126 MS
EKG QRS DURATION: 134 MS
EKG QRS DURATION: 134 MS
EKG QTC CALCULATION (BAZETT): 469 MS
EKG QTC CALCULATION (BAZETT): 475 MS
EKG QTC CALCULATION (BAZETT): 528 MS
EKG R AXIS: -65 DEGREES
EKG R AXIS: -69 DEGREES
EKG R AXIS: -79 DEGREES
EKG T AXIS: 100 DEGREES
EKG T AXIS: 73 DEGREES
EKG T AXIS: 80 DEGREES
EKG VENTRICULAR RATE: 177 BPM
EKG VENTRICULAR RATE: 74 BPM
EKG VENTRICULAR RATE: 78 BPM
EOSINOPHILS ABSOLUTE: 0 K/UL (ref 0–0.6)
EOSINOPHILS ABSOLUTE: 0.1 K/UL (ref 0–0.6)
EOSINOPHILS ABSOLUTE: 0.1 K/UL (ref 0–0.6)
EOSINOPHILS ABSOLUTE: 0.2 K/UL (ref 0–0.6)
EOSINOPHILS ABSOLUTE: 0.2 K/UL (ref 0–0.6)
EOSINOPHILS RELATIVE PERCENT: 0.7 %
EOSINOPHILS RELATIVE PERCENT: 0.8 %
EOSINOPHILS RELATIVE PERCENT: 1.5 %
EOSINOPHILS RELATIVE PERCENT: 2.9 %
EOSINOPHILS RELATIVE PERCENT: 3.5 %
EPITHELIAL CELLS, UA: ABNORMAL /HPF (ref 0–5)
ESTIMATED AVERAGE GLUCOSE: 99.7 MG/DL
FOLATE: >20 NG/ML (ref 4.78–24.2)
GFR AFRICAN AMERICAN: 57
GFR AFRICAN AMERICAN: >60
GFR NON-AFRICAN AMERICAN: 47
GFR NON-AFRICAN AMERICAN: 60
GFR NON-AFRICAN AMERICAN: >60
GLOBULIN: 3.5 G/DL
GLOBULIN: 3.6 G/DL
GLOBULIN: 3.6 G/DL
GLOBULIN: 4.2 G/DL
GLUCOSE BLD-MCNC: 100 MG/DL (ref 70–99)
GLUCOSE BLD-MCNC: 104 MG/DL (ref 70–99)
GLUCOSE BLD-MCNC: 137 MG/DL (ref 70–99)
GLUCOSE BLD-MCNC: 141 MG/DL (ref 70–99)
GLUCOSE BLD-MCNC: 146 MG/DL (ref 70–99)
GLUCOSE BLD-MCNC: 154 MG/DL (ref 70–99)
GLUCOSE BLD-MCNC: 58 MG/DL (ref 70–99)
GLUCOSE BLD-MCNC: 80 MG/DL (ref 70–99)
GLUCOSE BLD-MCNC: 82 MG/DL (ref 70–99)
GLUCOSE BLD-MCNC: 84 MG/DL (ref 70–99)
GLUCOSE BLD-MCNC: 89 MG/DL (ref 70–99)
GLUCOSE BLD-MCNC: 91 MG/DL (ref 70–99)
GLUCOSE BLD-MCNC: 94 MG/DL (ref 70–99)
GLUCOSE BLD-MCNC: 95 MG/DL (ref 70–99)
GLUCOSE BLD-MCNC: 95 MG/DL (ref 70–99)
GLUCOSE BLD-MCNC: 97 MG/DL (ref 70–99)
GLUCOSE URINE, POC: NORMAL
GLUCOSE URINE: NEGATIVE MG/DL
HBA1C MFR BLD: 5.1 %
HCT VFR BLD CALC: 33.1 % (ref 36–48)
HCT VFR BLD CALC: 34 % (ref 36–48)
HCT VFR BLD CALC: 34.7 % (ref 36–48)
HCT VFR BLD CALC: 34.9 % (ref 36–48)
HCT VFR BLD CALC: 35.4 % (ref 36–48)
HCT VFR BLD CALC: 35.7 % (ref 36–48)
HCT VFR BLD CALC: 35.7 % (ref 36–48)
HCT VFR BLD CALC: 35.8 % (ref 36–48)
HCT VFR BLD CALC: 36.3 % (ref 36–48)
HCT VFR BLD CALC: 36.6 % (ref 36–48)
HCT VFR BLD CALC: 36.7 % (ref 36–48)
HCT VFR BLD CALC: 37.5 % (ref 36–48)
HCT VFR BLD CALC: 37.7 % (ref 36–48)
HCT VFR BLD CALC: 38.1 % (ref 36–48)
HCT VFR BLD CALC: 38.6 % (ref 36–48)
HCT VFR BLD CALC: 39.8 % (ref 36–48)
HCT VFR BLD CALC: 40.1 % (ref 36–48)
HCT VFR BLD CALC: 40.9 % (ref 36–48)
HEMOGLOBIN: 11.4 G/DL (ref 12–16)
HEMOGLOBIN: 11.5 G/DL (ref 12–16)
HEMOGLOBIN: 11.7 G/DL (ref 12–16)
HEMOGLOBIN: 11.8 G/DL (ref 12–16)
HEMOGLOBIN: 12 G/DL (ref 12–16)
HEMOGLOBIN: 12.2 G/DL (ref 12–16)
HEMOGLOBIN: 12.3 G/DL (ref 12–16)
HEMOGLOBIN: 12.4 G/DL (ref 12–16)
HEMOGLOBIN: 12.7 G/DL (ref 12–16)
HEMOGLOBIN: 12.8 G/DL (ref 12–16)
HEMOGLOBIN: 13 G/DL (ref 12–16)
HEMOGLOBIN: 13.2 G/DL (ref 12–16)
HEMOGLOBIN: 13.3 G/DL (ref 12–16)
HEMOGLOBIN: 13.5 G/DL (ref 12–16)
HEMOGLOBIN: 13.8 G/DL (ref 12–16)
INR BLD: 1.15 (ref 0.86–1.14)
INR BLD: 1.26 (ref 0.86–1.14)
KETONES, POC: NORMAL
KETONES, URINE: NEGATIVE MG/DL
LACTIC ACID: 0.8 MMOL/L (ref 0.4–2)
LACTIC ACID: 0.9 MMOL/L (ref 0.4–2)
LACTIC ACID: 1 MMOL/L (ref 0.4–2)
LEUKOCYTE EST, POC: NORMAL
LEUKOCYTE ESTERASE, URINE: NEGATIVE
LIPASE: 82 U/L (ref 13–60)
LIPASE: 91 U/L (ref 13–60)
LV EF: 23 %
LVEF MODALITY: NORMAL
LYMPHOCYTES ABSOLUTE: 0.8 K/UL (ref 1–5.1)
LYMPHOCYTES ABSOLUTE: 1 K/UL (ref 1–5.1)
LYMPHOCYTES ABSOLUTE: 1.1 K/UL (ref 1–5.1)
LYMPHOCYTES ABSOLUTE: 1.2 K/UL (ref 1–5.1)
LYMPHOCYTES ABSOLUTE: 1.4 K/UL (ref 1–5.1)
LYMPHOCYTES RELATIVE PERCENT: 12 %
LYMPHOCYTES RELATIVE PERCENT: 16.3 %
LYMPHOCYTES RELATIVE PERCENT: 18.4 %
LYMPHOCYTES RELATIVE PERCENT: 18.6 %
LYMPHOCYTES RELATIVE PERCENT: 20.7 %
MAGNESIUM: 1.8 MG/DL (ref 1.8–2.4)
MAGNESIUM: 1.9 MG/DL (ref 1.8–2.4)
MAGNESIUM: 2.1 MG/DL (ref 1.8–2.4)
MCH RBC QN AUTO: 31.3 PG (ref 26–34)
MCH RBC QN AUTO: 31.4 PG (ref 26–34)
MCH RBC QN AUTO: 31.4 PG (ref 26–34)
MCH RBC QN AUTO: 31.5 PG (ref 26–34)
MCH RBC QN AUTO: 31.5 PG (ref 26–34)
MCH RBC QN AUTO: 31.6 PG (ref 26–34)
MCH RBC QN AUTO: 31.7 PG (ref 26–34)
MCH RBC QN AUTO: 31.8 PG (ref 26–34)
MCH RBC QN AUTO: 31.8 PG (ref 26–34)
MCH RBC QN AUTO: 32.1 PG (ref 26–34)
MCH RBC QN AUTO: 32.1 PG (ref 26–34)
MCHC RBC AUTO-ENTMCNC: 33.1 G/DL (ref 31–36)
MCHC RBC AUTO-ENTMCNC: 33.6 G/DL (ref 31–36)
MCHC RBC AUTO-ENTMCNC: 33.7 G/DL (ref 31–36)
MCHC RBC AUTO-ENTMCNC: 33.8 G/DL (ref 31–36)
MCHC RBC AUTO-ENTMCNC: 33.9 G/DL (ref 31–36)
MCHC RBC AUTO-ENTMCNC: 34 G/DL (ref 31–36)
MCHC RBC AUTO-ENTMCNC: 34.2 G/DL (ref 31–36)
MCHC RBC AUTO-ENTMCNC: 34.3 G/DL (ref 31–36)
MCHC RBC AUTO-ENTMCNC: 34.4 G/DL (ref 31–36)
MCHC RBC AUTO-ENTMCNC: 34.4 G/DL (ref 31–36)
MCV RBC AUTO: 91.5 FL (ref 80–100)
MCV RBC AUTO: 91.9 FL (ref 80–100)
MCV RBC AUTO: 92.5 FL (ref 80–100)
MCV RBC AUTO: 92.7 FL (ref 80–100)
MCV RBC AUTO: 92.9 FL (ref 80–100)
MCV RBC AUTO: 93 FL (ref 80–100)
MCV RBC AUTO: 93.1 FL (ref 80–100)
MCV RBC AUTO: 93.5 FL (ref 80–100)
MCV RBC AUTO: 93.7 FL (ref 80–100)
MCV RBC AUTO: 93.7 FL (ref 80–100)
MCV RBC AUTO: 93.8 FL (ref 80–100)
MCV RBC AUTO: 94 FL (ref 80–100)
MCV RBC AUTO: 94.1 FL (ref 80–100)
MCV RBC AUTO: 94.3 FL (ref 80–100)
MCV RBC AUTO: 94.9 FL (ref 80–100)
MCV RBC AUTO: 95 FL (ref 80–100)
MICROSCOPIC EXAMINATION: NORMAL
MICROSCOPIC EXAMINATION: NORMAL
MICROSCOPIC EXAMINATION: YES
MONOCYTES ABSOLUTE: 0.4 K/UL (ref 0–1.3)
MONOCYTES ABSOLUTE: 0.4 K/UL (ref 0–1.3)
MONOCYTES ABSOLUTE: 0.5 K/UL (ref 0–1.3)
MONOCYTES ABSOLUTE: 0.6 K/UL (ref 0–1.3)
MONOCYTES ABSOLUTE: 0.6 K/UL (ref 0–1.3)
MONOCYTES RELATIVE PERCENT: 6.5 %
MONOCYTES RELATIVE PERCENT: 6.9 %
MONOCYTES RELATIVE PERCENT: 7.2 %
MONOCYTES RELATIVE PERCENT: 7.9 %
MONOCYTES RELATIVE PERCENT: 9.2 %
MUCUS: ABNORMAL /LPF
NEUTROPHILS ABSOLUTE: 4.3 K/UL (ref 1.7–7.7)
NEUTROPHILS ABSOLUTE: 4.4 K/UL (ref 1.7–7.7)
NEUTROPHILS ABSOLUTE: 4.5 K/UL (ref 1.7–7.7)
NEUTROPHILS ABSOLUTE: 4.7 K/UL (ref 1.7–7.7)
NEUTROPHILS ABSOLUTE: 5.6 K/UL (ref 1.7–7.7)
NEUTROPHILS RELATIVE PERCENT: 70.2 %
NEUTROPHILS RELATIVE PERCENT: 70.3 %
NEUTROPHILS RELATIVE PERCENT: 71.3 %
NEUTROPHILS RELATIVE PERCENT: 73.2 %
NEUTROPHILS RELATIVE PERCENT: 79 %
NITRITE, POC: NORMAL
NITRITE, URINE: NEGATIVE
PDW BLD-RTO: 13.9 % (ref 12.4–15.4)
PDW BLD-RTO: 14 % (ref 12.4–15.4)
PDW BLD-RTO: 14.1 % (ref 12.4–15.4)
PDW BLD-RTO: 14.2 % (ref 12.4–15.4)
PDW BLD-RTO: 14.3 % (ref 12.4–15.4)
PDW BLD-RTO: 14.4 % (ref 12.4–15.4)
PDW BLD-RTO: 14.5 % (ref 12.4–15.4)
PDW BLD-RTO: 14.5 % (ref 12.4–15.4)
PH UA: 6.5 (ref 5–8)
PH UA: 7 (ref 5–8)
PH UA: 7.5 (ref 5–8)
PH, POC: 5.5
PHOSPHORUS: 1.8 MG/DL (ref 2.5–4.9)
PHOSPHORUS: 2.2 MG/DL (ref 2.5–4.9)
PHOSPHORUS: 2.2 MG/DL (ref 2.5–4.9)
PHOSPHORUS: 2.4 MG/DL (ref 2.5–4.9)
PHOSPHORUS: 2.9 MG/DL (ref 2.5–4.9)
PLATELET # BLD: 189 K/UL (ref 135–450)
PLATELET # BLD: 190 K/UL (ref 135–450)
PLATELET # BLD: 195 K/UL (ref 135–450)
PLATELET # BLD: 197 K/UL (ref 135–450)
PLATELET # BLD: 200 K/UL (ref 135–450)
PLATELET # BLD: 210 K/UL (ref 135–450)
PLATELET # BLD: 217 K/UL (ref 135–450)
PLATELET # BLD: 220 K/UL (ref 135–450)
PLATELET # BLD: 227 K/UL (ref 135–450)
PLATELET # BLD: 231 K/UL (ref 135–450)
PLATELET # BLD: 232 K/UL (ref 135–450)
PLATELET # BLD: 235 K/UL (ref 135–450)
PLATELET # BLD: 237 K/UL (ref 135–450)
PLATELET # BLD: 241 K/UL (ref 135–450)
PLATELET # BLD: 246 K/UL (ref 135–450)
PLATELET # BLD: 248 K/UL (ref 135–450)
PLATELET # BLD: 258 K/UL (ref 135–450)
PLATELET # BLD: 278 K/UL (ref 135–450)
PMV BLD AUTO: 6.7 FL (ref 5–10.5)
PMV BLD AUTO: 6.7 FL (ref 5–10.5)
PMV BLD AUTO: 6.8 FL (ref 5–10.5)
PMV BLD AUTO: 6.9 FL (ref 5–10.5)
PMV BLD AUTO: 7.1 FL (ref 5–10.5)
PMV BLD AUTO: 7.1 FL (ref 5–10.5)
PMV BLD AUTO: 7.2 FL (ref 5–10.5)
PMV BLD AUTO: 7.2 FL (ref 5–10.5)
PMV BLD AUTO: 7.3 FL (ref 5–10.5)
PMV BLD AUTO: 7.4 FL (ref 5–10.5)
PMV BLD AUTO: 7.4 FL (ref 5–10.5)
PMV BLD AUTO: 7.5 FL (ref 5–10.5)
PMV BLD AUTO: 7.5 FL (ref 5–10.5)
POTASSIUM REFLEX MAGNESIUM: 3.6 MMOL/L (ref 3.5–5.1)
POTASSIUM REFLEX MAGNESIUM: 3.6 MMOL/L (ref 3.5–5.1)
POTASSIUM REFLEX MAGNESIUM: 3.8 MMOL/L (ref 3.5–5.1)
POTASSIUM REFLEX MAGNESIUM: 3.8 MMOL/L (ref 3.5–5.1)
POTASSIUM REFLEX MAGNESIUM: 3.9 MMOL/L (ref 3.5–5.1)
POTASSIUM REFLEX MAGNESIUM: 4 MMOL/L (ref 3.5–5.1)
POTASSIUM REFLEX MAGNESIUM: 4.1 MMOL/L (ref 3.5–5.1)
POTASSIUM REFLEX MAGNESIUM: 4.3 MMOL/L (ref 3.5–5.1)
POTASSIUM REFLEX MAGNESIUM: 4.5 MMOL/L (ref 3.5–5.1)
POTASSIUM SERPL-SCNC: 3.5 MMOL/L (ref 3.5–5.1)
POTASSIUM SERPL-SCNC: 3.6 MMOL/L (ref 3.5–5.1)
POTASSIUM SERPL-SCNC: 3.8 MMOL/L (ref 3.5–5.1)
POTASSIUM SERPL-SCNC: 3.9 MMOL/L (ref 3.5–5.1)
POTASSIUM SERPL-SCNC: 4 MMOL/L (ref 3.5–5.1)
POTASSIUM SERPL-SCNC: 4.1 MMOL/L (ref 3.5–5.1)
POTASSIUM SERPL-SCNC: 4.7 MMOL/L (ref 3.5–5.1)
PREALBUMIN: 21.3 MG/DL (ref 20–40)
PRO-BNP: 3848 PG/ML (ref 0–449)
PROTEIN UA: ABNORMAL MG/DL
PROTEIN UA: NEGATIVE MG/DL
PROTEIN UA: NEGATIVE MG/DL
PROTEIN, POC: NORMAL
PROTHROMBIN TIME: 13.4 SEC (ref 10–13.2)
PROTHROMBIN TIME: 14.6 SEC (ref 10–13.2)
RBC # BLD: 3.58 M/UL (ref 4–5.2)
RBC # BLD: 3.67 M/UL (ref 4–5.2)
RBC # BLD: 3.69 M/UL (ref 4–5.2)
RBC # BLD: 3.73 M/UL (ref 4–5.2)
RBC # BLD: 3.79 M/UL (ref 4–5.2)
RBC # BLD: 3.82 M/UL (ref 4–5.2)
RBC # BLD: 3.84 M/UL (ref 4–5.2)
RBC # BLD: 3.86 M/UL (ref 4–5.2)
RBC # BLD: 3.9 M/UL (ref 4–5.2)
RBC # BLD: 3.91 M/UL (ref 4–5.2)
RBC # BLD: 3.95 M/UL (ref 4–5.2)
RBC # BLD: 4.02 M/UL (ref 4–5.2)
RBC # BLD: 4.04 M/UL (ref 4–5.2)
RBC # BLD: 4.1 M/UL (ref 4–5.2)
RBC # BLD: 4.19 M/UL (ref 4–5.2)
RBC # BLD: 4.2 M/UL (ref 4–5.2)
RBC # BLD: 4.27 M/UL (ref 4–5.2)
RBC # BLD: 4.31 M/UL (ref 4–5.2)
RBC UA: ABNORMAL /HPF (ref 0–4)
SARS-COV-2, NAAT: NOT DETECTED
SODIUM BLD-SCNC: 135 MMOL/L (ref 136–145)
SODIUM BLD-SCNC: 136 MMOL/L (ref 136–145)
SODIUM BLD-SCNC: 136 MMOL/L (ref 136–145)
SODIUM BLD-SCNC: 137 MMOL/L (ref 136–145)
SODIUM BLD-SCNC: 138 MMOL/L (ref 136–145)
SODIUM BLD-SCNC: 139 MMOL/L (ref 136–145)
SODIUM BLD-SCNC: 140 MMOL/L (ref 136–145)
SPECIFIC GRAVITY UA: 1.01 (ref 1–1.03)
SPECIFIC GRAVITY UA: 1.01 (ref 1–1.03)
SPECIFIC GRAVITY UA: 1.02 (ref 1–1.03)
SPECIFIC GRAVITY, POC: 1.02
T4 FREE: 1.4 NG/DL (ref 0.9–1.8)
TOTAL PROTEIN: 6.8 G/DL (ref 6.4–8.2)
TOTAL PROTEIN: 6.9 G/DL (ref 6.4–8.2)
TOTAL PROTEIN: 7.3 G/DL (ref 6.4–8.2)
TOTAL PROTEIN: 8.1 G/DL (ref 6.4–8.2)
TROPONIN: 0.01 NG/ML
TROPONIN: 0.02 NG/ML
TROPONIN: 0.03 NG/ML
TROPONIN: 0.06 NG/ML
TROPONIN: 0.08 NG/ML
TROPONIN: <0.01 NG/ML
TROPONIN: <0.01 NG/ML
TSH SERPL DL<=0.05 MIU/L-ACNC: 2.49 UIU/ML (ref 0.27–4.2)
URINE CULTURE, ROUTINE: NORMAL
URINE REFLEX TO CULTURE: NORMAL
URINE TYPE: ABNORMAL
URINE TYPE: NORMAL
URINE TYPE: NORMAL
UROBILINOGEN, POC: 0.2
UROBILINOGEN, URINE: 0.2 E.U./DL
VITAMIN B-12: 959 PG/ML (ref 211–911)
VITAMIN D 25-HYDROXY: 75.4 NG/ML
WBC # BLD: 4.5 K/UL (ref 4–11)
WBC # BLD: 4.6 K/UL (ref 4–11)
WBC # BLD: 5.1 K/UL (ref 4–11)
WBC # BLD: 5.3 K/UL (ref 4–11)
WBC # BLD: 5.3 K/UL (ref 4–11)
WBC # BLD: 5.5 K/UL (ref 4–11)
WBC # BLD: 5.5 K/UL (ref 4–11)
WBC # BLD: 5.8 K/UL (ref 4–11)
WBC # BLD: 5.8 K/UL (ref 4–11)
WBC # BLD: 6 K/UL (ref 4–11)
WBC # BLD: 6.1 K/UL (ref 4–11)
WBC # BLD: 6.1 K/UL (ref 4–11)
WBC # BLD: 6.2 K/UL (ref 4–11)
WBC # BLD: 6.3 K/UL (ref 4–11)
WBC # BLD: 6.5 K/UL (ref 4–11)
WBC # BLD: 6.6 K/UL (ref 4–11)
WBC # BLD: 7.1 K/UL (ref 4–11)
WBC # BLD: 7.3 K/UL (ref 4–11)
WBC UA: ABNORMAL /HPF (ref 0–5)

## 2021-01-01 PROCEDURE — 74174 CTA ABD&PLVS W/CONTRAST: CPT

## 2021-01-01 PROCEDURE — 92610 EVALUATE SWALLOWING FUNCTION: CPT

## 2021-01-01 PROCEDURE — 97530 THERAPEUTIC ACTIVITIES: CPT

## 2021-01-01 PROCEDURE — 83690 ASSAY OF LIPASE: CPT

## 2021-01-01 PROCEDURE — 36415 COLL VENOUS BLD VENIPUNCTURE: CPT

## 2021-01-01 PROCEDURE — 1200000000 HC SEMI PRIVATE

## 2021-01-01 PROCEDURE — 97116 GAIT TRAINING THERAPY: CPT

## 2021-01-01 PROCEDURE — 3700000000 HC ANESTHESIA ATTENDED CARE: Performed by: SURGERY

## 2021-01-01 PROCEDURE — 80048 BASIC METABOLIC PNL TOTAL CA: CPT

## 2021-01-01 PROCEDURE — S2900 ROBOTIC SURGICAL SYSTEM: HCPCS | Performed by: SURGERY

## 2021-01-01 PROCEDURE — 85730 THROMBOPLASTIN TIME PARTIAL: CPT

## 2021-01-01 PROCEDURE — 97110 THERAPEUTIC EXERCISES: CPT

## 2021-01-01 PROCEDURE — 85379 FIBRIN DEGRADATION QUANT: CPT

## 2021-01-01 PROCEDURE — 99024 POSTOP FOLLOW-UP VISIT: CPT | Performed by: SURGERY

## 2021-01-01 PROCEDURE — 81002 URINALYSIS NONAUTO W/O SCOPE: CPT | Performed by: NURSE PRACTITIONER

## 2021-01-01 PROCEDURE — 85610 PROTHROMBIN TIME: CPT

## 2021-01-01 PROCEDURE — 99223 1ST HOSP IP/OBS HIGH 75: CPT | Performed by: INTERNAL MEDICINE

## 2021-01-01 PROCEDURE — 87635 SARS-COV-2 COVID-19 AMP PRB: CPT

## 2021-01-01 PROCEDURE — C9113 INJ PANTOPRAZOLE SODIUM, VIA: HCPCS | Performed by: SURGERY

## 2021-01-01 PROCEDURE — 1036F TOBACCO NON-USER: CPT | Performed by: NURSE PRACTITIONER

## 2021-01-01 PROCEDURE — 92611 MOTION FLUOROSCOPY/SWALLOW: CPT

## 2021-01-01 PROCEDURE — 2580000003 HC RX 258: Performed by: SURGERY

## 2021-01-01 PROCEDURE — 3600000009 HC SURGERY ROBOT BASE: Performed by: SURGERY

## 2021-01-01 PROCEDURE — 84484 ASSAY OF TROPONIN QUANT: CPT

## 2021-01-01 PROCEDURE — 0YU70JZ SUPPLEMENT RIGHT FEMORAL REGION WITH SYNTHETIC SUBSTITUTE, OPEN APPROACH: ICD-10-PCS | Performed by: SURGERY

## 2021-01-01 PROCEDURE — 71045 X-RAY EXAM CHEST 1 VIEW: CPT

## 2021-01-01 PROCEDURE — 96375 TX/PRO/DX INJ NEW DRUG ADDON: CPT

## 2021-01-01 PROCEDURE — 99223 1ST HOSP IP/OBS HIGH 75: CPT | Performed by: PSYCHIATRY & NEUROLOGY

## 2021-01-01 PROCEDURE — 80053 COMPREHEN METABOLIC PANEL: CPT

## 2021-01-01 PROCEDURE — 99284 EMERGENCY DEPT VISIT MOD MDM: CPT

## 2021-01-01 PROCEDURE — 6370000000 HC RX 637 (ALT 250 FOR IP): Performed by: NURSE PRACTITIONER

## 2021-01-01 PROCEDURE — 6360000002 HC RX W HCPCS: Performed by: NURSE PRACTITIONER

## 2021-01-01 PROCEDURE — 83735 ASSAY OF MAGNESIUM: CPT

## 2021-01-01 PROCEDURE — 97535 SELF CARE MNGMENT TRAINING: CPT

## 2021-01-01 PROCEDURE — 6370000000 HC RX 637 (ALT 250 FOR IP): Performed by: INTERNAL MEDICINE

## 2021-01-01 PROCEDURE — 80069 RENAL FUNCTION PANEL: CPT

## 2021-01-01 PROCEDURE — 6360000002 HC RX W HCPCS: Performed by: HOSPITALIST

## 2021-01-01 PROCEDURE — 85027 COMPLETE CBC AUTOMATED: CPT

## 2021-01-01 PROCEDURE — 93010 ELECTROCARDIOGRAM REPORT: CPT | Performed by: INTERNAL MEDICINE

## 2021-01-01 PROCEDURE — 82565 ASSAY OF CREATININE: CPT

## 2021-01-01 PROCEDURE — G8427 DOCREV CUR MEDS BY ELIG CLIN: HCPCS | Performed by: NURSE PRACTITIONER

## 2021-01-01 PROCEDURE — 2580000003 HC RX 258: Performed by: INTERNAL MEDICINE

## 2021-01-01 PROCEDURE — 85025 COMPLETE CBC W/AUTO DIFF WBC: CPT

## 2021-01-01 PROCEDURE — 6360000002 HC RX W HCPCS: Performed by: PHYSICIAN ASSISTANT

## 2021-01-01 PROCEDURE — 96374 THER/PROPH/DIAG INJ IV PUSH: CPT

## 2021-01-01 PROCEDURE — 49650 LAP ING HERNIA REPAIR INIT: CPT | Performed by: SURGERY

## 2021-01-01 PROCEDURE — 75635 CT ANGIO ABDOMINAL ARTERIES: CPT

## 2021-01-01 PROCEDURE — 97162 PT EVAL MOD COMPLEX 30 MIN: CPT

## 2021-01-01 PROCEDURE — 99232 SBSQ HOSP IP/OBS MODERATE 35: CPT | Performed by: INTERNAL MEDICINE

## 2021-01-01 PROCEDURE — 6360000002 HC RX W HCPCS: Performed by: INTERNAL MEDICINE

## 2021-01-01 PROCEDURE — 2500000003 HC RX 250 WO HCPCS: Performed by: SURGERY

## 2021-01-01 PROCEDURE — 84443 ASSAY THYROID STIM HORMONE: CPT

## 2021-01-01 PROCEDURE — 82746 ASSAY OF FOLIC ACID SERUM: CPT

## 2021-01-01 PROCEDURE — 6360000002 HC RX W HCPCS: Performed by: SURGERY

## 2021-01-01 PROCEDURE — 87040 BLOOD CULTURE FOR BACTERIA: CPT

## 2021-01-01 PROCEDURE — 4040F PNEUMOC VAC/ADMIN/RCVD: CPT | Performed by: NURSE PRACTITIONER

## 2021-01-01 PROCEDURE — 92526 ORAL FUNCTION THERAPY: CPT

## 2021-01-01 PROCEDURE — 83605 ASSAY OF LACTIC ACID: CPT

## 2021-01-01 PROCEDURE — 2500000003 HC RX 250 WO HCPCS: Performed by: NURSE PRACTITIONER

## 2021-01-01 PROCEDURE — 74018 RADEX ABDOMEN 1 VIEW: CPT

## 2021-01-01 PROCEDURE — 2500000003 HC RX 250 WO HCPCS: Performed by: HOSPITALIST

## 2021-01-01 PROCEDURE — 2580000003 HC RX 258: Performed by: HOSPITALIST

## 2021-01-01 PROCEDURE — 99232 SBSQ HOSP IP/OBS MODERATE 35: CPT | Performed by: SURGERY

## 2021-01-01 PROCEDURE — 3700000001 HC ADD 15 MINUTES (ANESTHESIA): Performed by: SURGERY

## 2021-01-01 PROCEDURE — 84439 ASSAY OF FREE THYROXINE: CPT

## 2021-01-01 PROCEDURE — 1123F ACP DISCUSS/DSCN MKR DOCD: CPT | Performed by: NURSE PRACTITIONER

## 2021-01-01 PROCEDURE — 6360000004 HC RX CONTRAST MEDICATION: Performed by: INTERNAL MEDICINE

## 2021-01-01 PROCEDURE — 81003 URINALYSIS AUTO W/O SCOPE: CPT

## 2021-01-01 PROCEDURE — 82607 VITAMIN B-12: CPT

## 2021-01-01 PROCEDURE — 2709999900 HC NON-CHARGEABLE SUPPLY: Performed by: INTERNAL MEDICINE

## 2021-01-01 PROCEDURE — 7100000001 HC PACU RECOVERY - ADDTL 15 MIN: Performed by: SURGERY

## 2021-01-01 PROCEDURE — 82542 COL CHROMOTOGRAPHY QUAL/QUAN: CPT

## 2021-01-01 PROCEDURE — APPSS45 APP SPLIT SHARED TIME 31-45 MINUTES: Performed by: CLINICAL NURSE SPECIALIST

## 2021-01-01 PROCEDURE — 93005 ELECTROCARDIOGRAM TRACING: CPT | Performed by: INTERNAL MEDICINE

## 2021-01-01 PROCEDURE — 83036 HEMOGLOBIN GLYCOSYLATED A1C: CPT

## 2021-01-01 PROCEDURE — 83880 ASSAY OF NATRIURETIC PEPTIDE: CPT

## 2021-01-01 PROCEDURE — G8399 PT W/DXA RESULTS DOCUMENT: HCPCS | Performed by: NURSE PRACTITIONER

## 2021-01-01 PROCEDURE — 99285 EMERGENCY DEPT VISIT HI MDM: CPT

## 2021-01-01 PROCEDURE — C1769 GUIDE WIRE: HCPCS | Performed by: INTERNAL MEDICINE

## 2021-01-01 PROCEDURE — 2580000003 HC RX 258: Performed by: PHYSICIAN ASSISTANT

## 2021-01-01 PROCEDURE — G8484 FLU IMMUNIZE NO ADMIN: HCPCS | Performed by: NURSE PRACTITIONER

## 2021-01-01 PROCEDURE — 3609012400 HC EGD TRANSORAL BIOPSY SINGLE/MULTIPLE: Performed by: INTERNAL MEDICINE

## 2021-01-01 PROCEDURE — 6360000004 HC RX CONTRAST MEDICATION: Performed by: PHYSICIAN ASSISTANT

## 2021-01-01 PROCEDURE — 74022 RADEX COMPL AQT ABD SERIES: CPT

## 2021-01-01 PROCEDURE — 1111F DSCHRG MED/CURRENT MED MERGE: CPT | Performed by: NURSE PRACTITIONER

## 2021-01-01 PROCEDURE — 2500000003 HC RX 250 WO HCPCS: Performed by: INTERNAL MEDICINE

## 2021-01-01 PROCEDURE — 97165 OT EVAL LOW COMPLEX 30 MIN: CPT

## 2021-01-01 PROCEDURE — 2709999900 HC NON-CHARGEABLE SUPPLY: Performed by: SURGERY

## 2021-01-01 PROCEDURE — 74177 CT ABD & PELVIS W/CONTRAST: CPT

## 2021-01-01 PROCEDURE — 1090F PRES/ABSN URINE INCON ASSESS: CPT | Performed by: NURSE PRACTITIONER

## 2021-01-01 PROCEDURE — 84134 ASSAY OF PREALBUMIN: CPT

## 2021-01-01 PROCEDURE — 74220 X-RAY XM ESOPHAGUS 1CNTRST: CPT

## 2021-01-01 PROCEDURE — 0D758ZZ DILATION OF ESOPHAGUS, VIA NATURAL OR ARTIFICIAL OPENING ENDOSCOPIC: ICD-10-PCS | Performed by: INTERNAL MEDICINE

## 2021-01-01 PROCEDURE — C9113 INJ PANTOPRAZOLE SODIUM, VIA: HCPCS | Performed by: HOSPITALIST

## 2021-01-01 PROCEDURE — 74230 X-RAY XM SWLNG FUNCJ C+: CPT

## 2021-01-01 PROCEDURE — 88305 TISSUE EXAM BY PATHOLOGIST: CPT

## 2021-01-01 PROCEDURE — 7100000000 HC PACU RECOVERY - FIRST 15 MIN: Performed by: SURGERY

## 2021-01-01 PROCEDURE — 86901 BLOOD TYPING SEROLOGIC RH(D): CPT

## 2021-01-01 PROCEDURE — 93005 ELECTROCARDIOGRAM TRACING: CPT | Performed by: EMERGENCY MEDICINE

## 2021-01-01 PROCEDURE — 2500000003 HC RX 250 WO HCPCS: Performed by: NURSE ANESTHETIST, CERTIFIED REGISTERED

## 2021-01-01 PROCEDURE — G8419 CALC BMI OUT NRM PARAM NOF/U: HCPCS | Performed by: NURSE PRACTITIONER

## 2021-01-01 PROCEDURE — 6360000002 HC RX W HCPCS: Performed by: NURSE ANESTHETIST, CERTIFIED REGISTERED

## 2021-01-01 PROCEDURE — 3600000019 HC SURGERY ROBOT ADDTL 15MIN: Performed by: SURGERY

## 2021-01-01 PROCEDURE — 76705 ECHO EXAM OF ABDOMEN: CPT

## 2021-01-01 PROCEDURE — 99223 1ST HOSP IP/OBS HIGH 75: CPT | Performed by: SURGERY

## 2021-01-01 PROCEDURE — 0509F URINE INCON PLAN DOCD: CPT | Performed by: NURSE PRACTITIONER

## 2021-01-01 PROCEDURE — 7100000010 HC PHASE II RECOVERY - FIRST 15 MIN: Performed by: INTERNAL MEDICINE

## 2021-01-01 PROCEDURE — 3609012700 HC EGD DILATION SAVORY: Performed by: INTERNAL MEDICINE

## 2021-01-01 PROCEDURE — 97166 OT EVAL MOD COMPLEX 45 MIN: CPT

## 2021-01-01 PROCEDURE — 0DB68ZX EXCISION OF STOMACH, VIA NATURAL OR ARTIFICIAL OPENING ENDOSCOPIC, DIAGNOSTIC: ICD-10-PCS | Performed by: INTERNAL MEDICINE

## 2021-01-01 PROCEDURE — 86900 BLOOD TYPING SEROLOGIC ABO: CPT

## 2021-01-01 PROCEDURE — 8E0W8CZ ROBOTIC ASSISTED PROCEDURE OF TRUNK REGION, VIA NATURAL OR ARTIFICIAL OPENING ENDOSCOPIC: ICD-10-PCS | Performed by: SURGERY

## 2021-01-01 PROCEDURE — 99214 OFFICE O/P EST MOD 30 MIN: CPT | Performed by: NURSE PRACTITIONER

## 2021-01-01 PROCEDURE — C1781 MESH (IMPLANTABLE): HCPCS | Performed by: SURGERY

## 2021-01-01 PROCEDURE — 93005 ELECTROCARDIOGRAM TRACING: CPT | Performed by: HOSPITALIST

## 2021-01-01 PROCEDURE — 82306 VITAMIN D 25 HYDROXY: CPT

## 2021-01-01 PROCEDURE — 93306 TTE W/DOPPLER COMPLETE: CPT

## 2021-01-01 PROCEDURE — 7100000011 HC PHASE II RECOVERY - ADDTL 15 MIN: Performed by: INTERNAL MEDICINE

## 2021-01-01 PROCEDURE — 2580000003 HC RX 258: Performed by: NURSE PRACTITIONER

## 2021-01-01 PROCEDURE — 81001 URINALYSIS AUTO W/SCOPE: CPT

## 2021-01-01 PROCEDURE — G8510 SCR DEP NEG, NO PLAN REQD: HCPCS | Performed by: NURSE PRACTITIONER

## 2021-01-01 PROCEDURE — 86850 RBC ANTIBODY SCREEN: CPT

## 2021-01-01 PROCEDURE — 99152 MOD SED SAME PHYS/QHP 5/>YRS: CPT | Performed by: INTERNAL MEDICINE

## 2021-01-01 PROCEDURE — 2580000003 HC RX 258: Performed by: NURSE ANESTHETIST, CERTIFIED REGISTERED

## 2021-01-01 DEVICE — MESH HERN L W10.8XL16CM R INGUINAL WHT POLYPR MFIL: Type: IMPLANTABLE DEVICE | Site: INGUINAL | Status: FUNCTIONAL

## 2021-01-01 RX ORDER — MEPERIDINE HYDROCHLORIDE 50 MG/ML
12.5 INJECTION INTRAMUSCULAR; INTRAVENOUS; SUBCUTANEOUS EVERY 5 MIN PRN
Status: DISCONTINUED | OUTPATIENT
Start: 2021-01-01 | End: 2021-01-01 | Stop reason: HOSPADM

## 2021-01-01 RX ORDER — DEXAMETHASONE SODIUM PHOSPHATE 10 MG/ML
INJECTION INTRAMUSCULAR; INTRAVENOUS PRN
Status: DISCONTINUED | OUTPATIENT
Start: 2021-01-01 | End: 2021-01-01 | Stop reason: SDUPTHER

## 2021-01-01 RX ORDER — ALPRAZOLAM 0.25 MG/1
0.25 TABLET ORAL 3 TIMES DAILY PRN
Qty: 20 TABLET | Refills: 0 | Status: SHIPPED | OUTPATIENT
Start: 2021-01-01 | End: 2021-01-01

## 2021-01-01 RX ORDER — METOCLOPRAMIDE HYDROCHLORIDE 5 MG/ML
5 INJECTION INTRAMUSCULAR; INTRAVENOUS ONCE
Status: COMPLETED | OUTPATIENT
Start: 2021-01-01 | End: 2021-01-01

## 2021-01-01 RX ORDER — METOPROLOL TARTRATE 5 MG/5ML
INJECTION INTRAVENOUS
Status: DISPENSED
Start: 2021-01-01 | End: 2021-01-01

## 2021-01-01 RX ORDER — CLOPIDOGREL BISULFATE 75 MG/1
75 TABLET ORAL DAILY
COMMUNITY

## 2021-01-01 RX ORDER — OXYCODONE HYDROCHLORIDE AND ACETAMINOPHEN 5; 325 MG/1; MG/1
1 TABLET ORAL PRN
Status: DISCONTINUED | OUTPATIENT
Start: 2021-01-01 | End: 2021-01-01 | Stop reason: HOSPADM

## 2021-01-01 RX ORDER — BISACODYL 10 MG
10 SUPPOSITORY, RECTAL RECTAL DAILY PRN
Status: DISCONTINUED | OUTPATIENT
Start: 2021-01-01 | End: 2021-01-01 | Stop reason: HOSPADM

## 2021-01-01 RX ORDER — PROMETHAZINE HYDROCHLORIDE 25 MG/ML
25 INJECTION, SOLUTION INTRAMUSCULAR; INTRAVENOUS EVERY 4 HOURS PRN
Status: DISCONTINUED | OUTPATIENT
Start: 2021-01-01 | End: 2021-01-01 | Stop reason: HOSPADM

## 2021-01-01 RX ORDER — DILTIAZEM HYDROCHLORIDE 5 MG/ML
20 INJECTION INTRAVENOUS ONCE
Status: DISCONTINUED | OUTPATIENT
Start: 2021-01-01 | End: 2021-01-01

## 2021-01-01 RX ORDER — PANTOPRAZOLE SODIUM 40 MG/10ML
40 INJECTION, POWDER, LYOPHILIZED, FOR SOLUTION INTRAVENOUS DAILY
Status: DISCONTINUED | OUTPATIENT
Start: 2021-01-01 | End: 2021-01-01 | Stop reason: HOSPADM

## 2021-01-01 RX ORDER — ONDANSETRON 2 MG/ML
4 INJECTION INTRAMUSCULAR; INTRAVENOUS
Status: DISCONTINUED | OUTPATIENT
Start: 2021-01-01 | End: 2021-01-01 | Stop reason: HOSPADM

## 2021-01-01 RX ORDER — SODIUM CHLORIDE 9 MG/ML
25 INJECTION, SOLUTION INTRAVENOUS PRN
Status: DISCONTINUED | OUTPATIENT
Start: 2021-01-01 | End: 2021-01-01 | Stop reason: HOSPADM

## 2021-01-01 RX ORDER — METOPROLOL TARTRATE 5 MG/5ML
5 INJECTION INTRAVENOUS ONCE
Status: COMPLETED | OUTPATIENT
Start: 2021-01-01 | End: 2021-01-01

## 2021-01-01 RX ORDER — ONDANSETRON 2 MG/ML
4 INJECTION INTRAMUSCULAR; INTRAVENOUS EVERY 6 HOURS PRN
Status: DISCONTINUED | OUTPATIENT
Start: 2021-01-01 | End: 2021-01-01 | Stop reason: HOSPADM

## 2021-01-01 RX ORDER — ACETAMINOPHEN 325 MG/1
650 TABLET ORAL EVERY 6 HOURS PRN
Status: DISCONTINUED | OUTPATIENT
Start: 2021-01-01 | End: 2021-01-01 | Stop reason: HOSPADM

## 2021-01-01 RX ORDER — POTASSIUM CHLORIDE 20 MEQ/1
40 TABLET, EXTENDED RELEASE ORAL PRN
Status: DISCONTINUED | OUTPATIENT
Start: 2021-01-01 | End: 2021-01-01 | Stop reason: HOSPADM

## 2021-01-01 RX ORDER — SODIUM CHLORIDE 0.9 % (FLUSH) 0.9 %
10 SYRINGE (ML) INJECTION PRN
Status: DISCONTINUED | OUTPATIENT
Start: 2021-01-01 | End: 2021-01-01 | Stop reason: HOSPADM

## 2021-01-01 RX ORDER — ALPRAZOLAM 0.25 MG/1
0.25 TABLET ORAL 2 TIMES DAILY
Status: DISCONTINUED | OUTPATIENT
Start: 2021-01-01 | End: 2021-01-01

## 2021-01-01 RX ORDER — CHOLESTYRAMINE 4 G/9G
1 POWDER, FOR SUSPENSION ORAL 2 TIMES DAILY
Status: DISCONTINUED | OUTPATIENT
Start: 2021-01-01 | End: 2021-01-01 | Stop reason: HOSPADM

## 2021-01-01 RX ORDER — ATORVASTATIN CALCIUM 10 MG/1
20 TABLET, FILM COATED ORAL NIGHTLY
Status: DISCONTINUED | OUTPATIENT
Start: 2021-01-01 | End: 2021-01-01 | Stop reason: HOSPADM

## 2021-01-01 RX ORDER — 0.9 % SODIUM CHLORIDE 0.9 %
1000 INTRAVENOUS SOLUTION INTRAVENOUS ONCE
Status: COMPLETED | OUTPATIENT
Start: 2021-01-01 | End: 2021-01-01

## 2021-01-01 RX ORDER — MORPHINE SULFATE 4 MG/ML
4 INJECTION, SOLUTION INTRAMUSCULAR; INTRAVENOUS EVERY 4 HOURS PRN
Status: DISCONTINUED | OUTPATIENT
Start: 2021-01-01 | End: 2021-01-01 | Stop reason: HOSPADM

## 2021-01-01 RX ORDER — LORAZEPAM 2 MG/ML
0.5 INJECTION INTRAMUSCULAR EVERY 4 HOURS PRN
Status: DISCONTINUED | OUTPATIENT
Start: 2021-01-01 | End: 2021-01-01 | Stop reason: HOSPADM

## 2021-01-01 RX ORDER — ATORVASTATIN CALCIUM 10 MG/1
20 TABLET, FILM COATED ORAL DAILY
Status: DISCONTINUED | OUTPATIENT
Start: 2021-01-01 | End: 2021-01-01 | Stop reason: CLARIF

## 2021-01-01 RX ORDER — PROPOFOL 10 MG/ML
INJECTION, EMULSION INTRAVENOUS PRN
Status: DISCONTINUED | OUTPATIENT
Start: 2021-01-01 | End: 2021-01-01 | Stop reason: SDUPTHER

## 2021-01-01 RX ORDER — METOPROLOL TARTRATE 5 MG/5ML
10 INJECTION INTRAVENOUS ONCE
Status: COMPLETED | OUTPATIENT
Start: 2021-01-01 | End: 2021-01-01

## 2021-01-01 RX ORDER — HEPARIN SODIUM 10000 [USP'U]/100ML
570 INJECTION, SOLUTION INTRAVENOUS CONTINUOUS
Status: DISCONTINUED | OUTPATIENT
Start: 2021-01-01 | End: 2021-01-01

## 2021-01-01 RX ORDER — ACETAMINOPHEN 650 MG/1
650 SUPPOSITORY RECTAL EVERY 6 HOURS PRN
Status: DISCONTINUED | OUTPATIENT
Start: 2021-01-01 | End: 2021-01-01 | Stop reason: HOSPADM

## 2021-01-01 RX ORDER — LORAZEPAM 2 MG/ML
0.5 INJECTION INTRAMUSCULAR ONCE
Status: COMPLETED | OUTPATIENT
Start: 2021-01-01 | End: 2021-01-01

## 2021-01-01 RX ORDER — 0.9 % SODIUM CHLORIDE 0.9 %
500 INTRAVENOUS SOLUTION INTRAVENOUS ONCE
Status: COMPLETED | OUTPATIENT
Start: 2021-01-01 | End: 2021-01-01

## 2021-01-01 RX ORDER — DIPHENHYDRAMINE HYDROCHLORIDE 50 MG/ML
25 INJECTION INTRAMUSCULAR; INTRAVENOUS ONCE
Status: COMPLETED | OUTPATIENT
Start: 2021-01-01 | End: 2021-01-01

## 2021-01-01 RX ORDER — HYDRALAZINE HYDROCHLORIDE 20 MG/ML
5 INJECTION INTRAMUSCULAR; INTRAVENOUS EVERY 4 HOURS PRN
Status: DISCONTINUED | OUTPATIENT
Start: 2021-01-01 | End: 2021-01-01

## 2021-01-01 RX ORDER — SPIRONOLACTONE 25 MG/1
25 TABLET ORAL DAILY
Status: DISCONTINUED | OUTPATIENT
Start: 2021-01-01 | End: 2021-01-01 | Stop reason: HOSPADM

## 2021-01-01 RX ORDER — ALPRAZOLAM 0.25 MG/1
0.25 TABLET ORAL 2 TIMES DAILY
Qty: 60 TABLET | Refills: 0 | Status: ON HOLD | OUTPATIENT
Start: 2021-01-01 | End: 2021-01-01 | Stop reason: SDUPTHER

## 2021-01-01 RX ORDER — SODIUM CHLORIDE 9 MG/ML
INJECTION, SOLUTION INTRAVENOUS CONTINUOUS
Status: ACTIVE | OUTPATIENT
Start: 2021-01-01 | End: 2021-01-01

## 2021-01-01 RX ORDER — DILTIAZEM HYDROCHLORIDE 120 MG/1
120 CAPSULE, COATED, EXTENDED RELEASE ORAL DAILY
Qty: 30 CAPSULE | Refills: 3 | Status: SHIPPED | OUTPATIENT
Start: 2021-01-01

## 2021-01-01 RX ORDER — CEFAZOLIN SODIUM 1 G/3ML
INJECTION, POWDER, FOR SOLUTION INTRAMUSCULAR; INTRAVENOUS PRN
Status: DISCONTINUED | OUTPATIENT
Start: 2021-01-01 | End: 2021-01-01 | Stop reason: SDUPTHER

## 2021-01-01 RX ORDER — DILTIAZEM HYDROCHLORIDE 5 MG/ML
10 INJECTION INTRAVENOUS ONCE
Status: COMPLETED | OUTPATIENT
Start: 2021-01-01 | End: 2021-01-01

## 2021-01-01 RX ORDER — SODIUM PHOSPHATE,MONO-DIBASIC 19G-7G/118
1 ENEMA (ML) RECTAL ONCE
Status: DISCONTINUED | OUTPATIENT
Start: 2021-01-01 | End: 2021-01-01 | Stop reason: HOSPADM

## 2021-01-01 RX ORDER — CEPHALEXIN 250 MG/1
CAPSULE ORAL
COMMUNITY
Start: 2021-01-01 | End: 2021-01-01

## 2021-01-01 RX ORDER — METOPROLOL TARTRATE 5 MG/5ML
5 INJECTION INTRAVENOUS ONCE
Status: DISCONTINUED | OUTPATIENT
Start: 2021-01-01 | End: 2021-01-01

## 2021-01-01 RX ORDER — METOPROLOL SUCCINATE 50 MG/1
100 TABLET, EXTENDED RELEASE ORAL DAILY
Status: DISCONTINUED | OUTPATIENT
Start: 2021-01-01 | End: 2021-01-01 | Stop reason: HOSPADM

## 2021-01-01 RX ORDER — SPIRONOLACTONE 25 MG/1
TABLET ORAL
Qty: 30 TABLET | Refills: 0 | Status: SHIPPED | OUTPATIENT
Start: 2021-01-01

## 2021-01-01 RX ORDER — DILTIAZEM HYDROCHLORIDE 120 MG/1
120 CAPSULE, COATED, EXTENDED RELEASE ORAL DAILY
Status: DISCONTINUED | OUTPATIENT
Start: 2021-01-01 | End: 2021-01-01 | Stop reason: HOSPADM

## 2021-01-01 RX ORDER — POLYETHYLENE GLYCOL 3350 17 G/17G
17 POWDER, FOR SOLUTION ORAL DAILY PRN
Status: DISCONTINUED | OUTPATIENT
Start: 2021-01-01 | End: 2021-01-01 | Stop reason: HOSPADM

## 2021-01-01 RX ORDER — MORPHINE SULFATE 2 MG/ML
1 INJECTION, SOLUTION INTRAMUSCULAR; INTRAVENOUS EVERY 5 MIN PRN
Status: DISCONTINUED | OUTPATIENT
Start: 2021-01-01 | End: 2021-01-01 | Stop reason: HOSPADM

## 2021-01-01 RX ORDER — SODIUM CHLORIDE 0.9 % (FLUSH) 0.9 %
5-40 SYRINGE (ML) INJECTION PRN
Status: DISCONTINUED | OUTPATIENT
Start: 2021-01-01 | End: 2021-01-01 | Stop reason: HOSPADM

## 2021-01-01 RX ORDER — POTASSIUM CHLORIDE 7.45 MG/ML
10 INJECTION INTRAVENOUS PRN
Status: DISCONTINUED | OUTPATIENT
Start: 2021-01-01 | End: 2021-01-01 | Stop reason: HOSPADM

## 2021-01-01 RX ORDER — MORPHINE SULFATE 2 MG/ML
2 INJECTION, SOLUTION INTRAMUSCULAR; INTRAVENOUS EVERY 4 HOURS PRN
Status: DISCONTINUED | OUTPATIENT
Start: 2021-01-01 | End: 2021-01-01 | Stop reason: HOSPADM

## 2021-01-01 RX ORDER — ONDANSETRON 4 MG/1
4 TABLET, ORALLY DISINTEGRATING ORAL EVERY 8 HOURS PRN
Status: DISCONTINUED | OUTPATIENT
Start: 2021-01-01 | End: 2021-01-01 | Stop reason: HOSPADM

## 2021-01-01 RX ORDER — SODIUM CHLORIDE 9 MG/ML
INJECTION, SOLUTION INTRAVENOUS CONTINUOUS
Status: DISCONTINUED | OUTPATIENT
Start: 2021-01-01 | End: 2021-01-01 | Stop reason: HOSPADM

## 2021-01-01 RX ORDER — ROCURONIUM BROMIDE 10 MG/ML
INJECTION, SOLUTION INTRAVENOUS PRN
Status: DISCONTINUED | OUTPATIENT
Start: 2021-01-01 | End: 2021-01-01 | Stop reason: SDUPTHER

## 2021-01-01 RX ORDER — HEPARIN SODIUM 1000 [USP'U]/ML
1200 INJECTION, SOLUTION INTRAVENOUS; SUBCUTANEOUS PRN
Status: DISCONTINUED | OUTPATIENT
Start: 2021-01-01 | End: 2021-01-01

## 2021-01-01 RX ORDER — PHENYLEPHRINE HCL IN 0.9% NACL 1 MG/10 ML
SYRINGE (ML) INTRAVENOUS PRN
Status: DISCONTINUED | OUTPATIENT
Start: 2021-01-01 | End: 2021-01-01 | Stop reason: SDUPTHER

## 2021-01-01 RX ORDER — PAROXETINE HYDROCHLORIDE 20 MG/1
20 TABLET, FILM COATED ORAL DAILY
Status: DISCONTINUED | OUTPATIENT
Start: 2021-01-01 | End: 2021-01-01

## 2021-01-01 RX ORDER — ONDANSETRON 2 MG/ML
INJECTION INTRAMUSCULAR; INTRAVENOUS PRN
Status: DISCONTINUED | OUTPATIENT
Start: 2021-01-01 | End: 2021-01-01 | Stop reason: SDUPTHER

## 2021-01-01 RX ORDER — SODIUM CHLORIDE, SODIUM LACTATE, POTASSIUM CHLORIDE, CALCIUM CHLORIDE 600; 310; 30; 20 MG/100ML; MG/100ML; MG/100ML; MG/100ML
INJECTION, SOLUTION INTRAVENOUS CONTINUOUS
Status: DISCONTINUED | OUTPATIENT
Start: 2021-01-01 | End: 2021-01-01

## 2021-01-01 RX ORDER — DILTIAZEM HYDROCHLORIDE 60 MG/1
30 TABLET, FILM COATED ORAL EVERY 6 HOURS SCHEDULED
Status: DISCONTINUED | OUTPATIENT
Start: 2021-01-01 | End: 2021-01-01 | Stop reason: HOSPADM

## 2021-01-01 RX ORDER — PAROXETINE HYDROCHLORIDE 20 MG/1
20 TABLET, FILM COATED ORAL DAILY
Qty: 30 TABLET | Refills: 1 | Status: ON HOLD
Start: 2021-01-01 | End: 2021-01-01 | Stop reason: HOSPADM

## 2021-01-01 RX ORDER — CLOPIDOGREL BISULFATE 75 MG/1
75 TABLET ORAL DAILY
Status: DISCONTINUED | OUTPATIENT
Start: 2021-01-01 | End: 2021-01-01 | Stop reason: HOSPADM

## 2021-01-01 RX ORDER — FOLIC ACID 1 MG/1
400 TABLET ORAL DAILY
Status: DISCONTINUED | OUTPATIENT
Start: 2021-01-01 | End: 2021-01-01 | Stop reason: HOSPADM

## 2021-01-01 RX ORDER — METOPROLOL SUCCINATE 50 MG/1
25 TABLET, EXTENDED RELEASE ORAL NIGHTLY
Status: DISCONTINUED | OUTPATIENT
Start: 2021-01-01 | End: 2021-01-01 | Stop reason: HOSPADM

## 2021-01-01 RX ORDER — SODIUM CHLORIDE 0.9 % (FLUSH) 0.9 %
5-40 SYRINGE (ML) INJECTION EVERY 12 HOURS SCHEDULED
Status: DISCONTINUED | OUTPATIENT
Start: 2021-01-01 | End: 2021-01-01 | Stop reason: HOSPADM

## 2021-01-01 RX ORDER — SIMVASTATIN 20 MG
TABLET ORAL
Qty: 90 TABLET | Refills: 3 | Status: ON HOLD
Start: 2021-01-01 | End: 2021-01-01 | Stop reason: HOSPADM

## 2021-01-01 RX ORDER — METOPROLOL SUCCINATE 25 MG/1
25 TABLET, EXTENDED RELEASE ORAL NIGHTLY
Status: DISCONTINUED | OUTPATIENT
Start: 2021-01-01 | End: 2021-01-01 | Stop reason: HOSPADM

## 2021-01-01 RX ORDER — FENTANYL CITRATE 50 UG/ML
INJECTION, SOLUTION INTRAMUSCULAR; INTRAVENOUS PRN
Status: DISCONTINUED | OUTPATIENT
Start: 2021-01-01 | End: 2021-01-01 | Stop reason: ALTCHOICE

## 2021-01-01 RX ORDER — OXYCODONE HYDROCHLORIDE AND ACETAMINOPHEN 5; 325 MG/1; MG/1
2 TABLET ORAL PRN
Status: DISCONTINUED | OUTPATIENT
Start: 2021-01-01 | End: 2021-01-01 | Stop reason: HOSPADM

## 2021-01-01 RX ORDER — ASPIRIN 81 MG/1
325 TABLET, CHEWABLE ORAL DAILY
Status: DISCONTINUED | OUTPATIENT
Start: 2021-01-01 | End: 2021-01-01

## 2021-01-01 RX ORDER — PROMETHAZINE HYDROCHLORIDE 25 MG/1
12.5 TABLET ORAL EVERY 6 HOURS PRN
Status: DISCONTINUED | OUTPATIENT
Start: 2021-01-01 | End: 2021-01-01 | Stop reason: HOSPADM

## 2021-01-01 RX ORDER — SODIUM CHLORIDE 0.9 % (FLUSH) 0.9 %
10 SYRINGE (ML) INJECTION EVERY 12 HOURS SCHEDULED
Status: DISCONTINUED | OUTPATIENT
Start: 2021-01-01 | End: 2021-01-01 | Stop reason: HOSPADM

## 2021-01-01 RX ORDER — FENTANYL CITRATE 50 UG/ML
INJECTION, SOLUTION INTRAMUSCULAR; INTRAVENOUS PRN
Status: DISCONTINUED | OUTPATIENT
Start: 2021-01-01 | End: 2021-01-01 | Stop reason: SDUPTHER

## 2021-01-01 RX ORDER — SODIUM CHLORIDE, SODIUM LACTATE, POTASSIUM CHLORIDE, CALCIUM CHLORIDE 600; 310; 30; 20 MG/100ML; MG/100ML; MG/100ML; MG/100ML
INJECTION, SOLUTION INTRAVENOUS CONTINUOUS PRN
Status: DISCONTINUED | OUTPATIENT
Start: 2021-01-01 | End: 2021-01-01 | Stop reason: SDUPTHER

## 2021-01-01 RX ORDER — ATORVASTATIN CALCIUM 10 MG/1
10 TABLET, FILM COATED ORAL DAILY
Status: DISCONTINUED | OUTPATIENT
Start: 2021-01-01 | End: 2021-01-01 | Stop reason: HOSPADM

## 2021-01-01 RX ORDER — MORPHINE SULFATE 2 MG/ML
2 INJECTION, SOLUTION INTRAMUSCULAR; INTRAVENOUS EVERY 5 MIN PRN
Status: DISCONTINUED | OUTPATIENT
Start: 2021-01-01 | End: 2021-01-01 | Stop reason: HOSPADM

## 2021-01-01 RX ORDER — ALPRAZOLAM 0.25 MG/1
0.25 TABLET ORAL 2 TIMES DAILY
Status: DISCONTINUED | OUTPATIENT
Start: 2021-01-01 | End: 2021-01-01 | Stop reason: HOSPADM

## 2021-01-01 RX ORDER — AZITHROMYCIN 250 MG/1
250 TABLET, FILM COATED ORAL SEE ADMIN INSTRUCTIONS
Status: ON HOLD | COMMUNITY
Start: 2021-01-01 | End: 2021-01-01 | Stop reason: HOSPADM

## 2021-01-01 RX ORDER — LIDOCAINE HYDROCHLORIDE 20 MG/ML
INJECTION, SOLUTION INFILTRATION; PERINEURAL PRN
Status: DISCONTINUED | OUTPATIENT
Start: 2021-01-01 | End: 2021-01-01 | Stop reason: SDUPTHER

## 2021-01-01 RX ORDER — CHOLESTYRAMINE 4 G/9G
1 POWDER, FOR SUSPENSION ORAL 2 TIMES DAILY
Qty: 90 PACKET | Refills: 3 | Status: ON HOLD
Start: 2021-01-01 | End: 2021-01-01 | Stop reason: HOSPADM

## 2021-01-01 RX ORDER — SENNA AND DOCUSATE SODIUM 50; 8.6 MG/1; MG/1
2 TABLET, FILM COATED ORAL DAILY
Qty: 60 TABLET | Refills: 0 | Status: SHIPPED | OUTPATIENT
Start: 2021-01-01 | End: 2021-08-22

## 2021-01-01 RX ORDER — DEXTROSE, SODIUM CHLORIDE, AND POTASSIUM CHLORIDE 5; .45; .15 G/100ML; G/100ML; G/100ML
INJECTION INTRAVENOUS CONTINUOUS
Status: DISPENSED | OUTPATIENT
Start: 2021-01-01 | End: 2021-01-01

## 2021-01-01 RX ORDER — CHOLESTYRAMINE LIGHT 4 G/5.7G
4 POWDER, FOR SUSPENSION ORAL DAILY
Status: DISCONTINUED | OUTPATIENT
Start: 2021-01-01 | End: 2021-01-01 | Stop reason: SDUPTHER

## 2021-01-01 RX ORDER — MAGNESIUM SULFATE IN WATER 40 MG/ML
2000 INJECTION, SOLUTION INTRAVENOUS PRN
Status: DISCONTINUED | OUTPATIENT
Start: 2021-01-01 | End: 2021-01-01 | Stop reason: HOSPADM

## 2021-01-01 RX ORDER — HEPARIN SODIUM 1000 [USP'U]/ML
2400 INJECTION, SOLUTION INTRAVENOUS; SUBCUTANEOUS PRN
Status: DISCONTINUED | OUTPATIENT
Start: 2021-01-01 | End: 2021-01-01

## 2021-01-01 RX ORDER — BUPIVACAINE HYDROCHLORIDE 5 MG/ML
INJECTION, SOLUTION EPIDURAL; INTRACAUDAL PRN
Status: DISCONTINUED | OUTPATIENT
Start: 2021-01-01 | End: 2021-01-01 | Stop reason: ALTCHOICE

## 2021-01-01 RX ORDER — METOPROLOL SUCCINATE 100 MG/1
100 TABLET, EXTENDED RELEASE ORAL DAILY
Qty: 90 TABLET | Refills: 3 | Status: SHIPPED | OUTPATIENT
Start: 2021-01-01

## 2021-01-01 RX ORDER — SODIUM PHOSPHATE,MONO-DIBASIC 19G-7G/118
1 ENEMA (ML) RECTAL DAILY PRN
Status: DISCONTINUED | OUTPATIENT
Start: 2021-01-01 | End: 2021-01-01 | Stop reason: HOSPADM

## 2021-01-01 RX ORDER — LORAZEPAM 2 MG/ML
0.25 INJECTION INTRAMUSCULAR EVERY 6 HOURS PRN
Status: DISCONTINUED | OUTPATIENT
Start: 2021-01-01 | End: 2021-01-01 | Stop reason: HOSPADM

## 2021-01-01 RX ORDER — MORPHINE SULFATE 2 MG/ML
2 INJECTION, SOLUTION INTRAMUSCULAR; INTRAVENOUS
Status: DISCONTINUED | OUTPATIENT
Start: 2021-01-01 | End: 2021-01-01 | Stop reason: HOSPADM

## 2021-01-01 RX ORDER — ONDANSETRON 2 MG/ML
4 INJECTION INTRAMUSCULAR; INTRAVENOUS EVERY 30 MIN PRN
Status: DISCONTINUED | OUTPATIENT
Start: 2021-01-01 | End: 2021-01-01 | Stop reason: HOSPADM

## 2021-01-01 RX ORDER — MIDAZOLAM HYDROCHLORIDE 5 MG/ML
INJECTION INTRAMUSCULAR; INTRAVENOUS PRN
Status: DISCONTINUED | OUTPATIENT
Start: 2021-01-01 | End: 2021-01-01 | Stop reason: ALTCHOICE

## 2021-01-01 RX ORDER — VITAMIN B COMPLEX
1000 TABLET ORAL DAILY
Status: DISCONTINUED | OUTPATIENT
Start: 2021-01-01 | End: 2021-01-01 | Stop reason: HOSPADM

## 2021-01-01 RX ORDER — SENNA AND DOCUSATE SODIUM 50; 8.6 MG/1; MG/1
2 TABLET, FILM COATED ORAL DAILY PRN
Status: DISCONTINUED | OUTPATIENT
Start: 2021-01-01 | End: 2021-01-01 | Stop reason: HOSPADM

## 2021-01-01 RX ORDER — METOPROLOL SUCCINATE 25 MG/1
25 TABLET, EXTENDED RELEASE ORAL NIGHTLY
Qty: 30 TABLET | Refills: 3 | Status: ON HOLD
Start: 2021-01-01 | End: 2021-01-01 | Stop reason: HOSPADM

## 2021-01-01 RX ORDER — CEPHALEXIN 500 MG/1
500 CAPSULE ORAL DAILY
Status: ON HOLD | COMMUNITY
End: 2021-01-01 | Stop reason: HOSPADM

## 2021-01-01 RX ADMIN — LORAZEPAM 0.5 MG: 2 INJECTION INTRAMUSCULAR; INTRAVENOUS at 22:18

## 2021-01-01 RX ADMIN — DOCUSATE SODIUM 50 MG AND SENNOSIDES 8.6 MG 2 TABLET: 8.6; 5 TABLET, FILM COATED ORAL at 04:15

## 2021-01-01 RX ADMIN — SODIUM CHLORIDE, PRESERVATIVE FREE 10 ML: 5 INJECTION INTRAVENOUS at 09:48

## 2021-01-01 RX ADMIN — LORAZEPAM 0.25 MG: 2 INJECTION INTRAMUSCULAR; INTRAVENOUS at 22:47

## 2021-01-01 RX ADMIN — DILTIAZEM HYDROCHLORIDE 30 MG: 60 TABLET, FILM COATED ORAL at 00:01

## 2021-01-01 RX ADMIN — ONDANSETRON 4 MG: 2 INJECTION INTRAMUSCULAR; INTRAVENOUS at 16:54

## 2021-01-01 RX ADMIN — SPIRONOLACTONE 25 MG: 25 TABLET ORAL at 10:18

## 2021-01-01 RX ADMIN — METOPROLOL SUCCINATE 100 MG: 50 TABLET, FILM COATED, EXTENDED RELEASE ORAL at 08:38

## 2021-01-01 RX ADMIN — Medication 40 MCG: at 14:21

## 2021-01-01 RX ADMIN — METOPROLOL TARTRATE 5 MG: 5 INJECTION INTRAVENOUS at 06:22

## 2021-01-01 RX ADMIN — CEFAZOLIN 2000 MG: 1 INJECTION, POWDER, FOR SOLUTION INTRAMUSCULAR; INTRAVENOUS at 14:18

## 2021-01-01 RX ADMIN — SODIUM CHLORIDE: 9 INJECTION, SOLUTION INTRAVENOUS at 17:18

## 2021-01-01 RX ADMIN — PANTOPRAZOLE SODIUM 40 MG: 40 INJECTION, POWDER, FOR SOLUTION INTRAVENOUS at 08:34

## 2021-01-01 RX ADMIN — ALPRAZOLAM 0.25 MG: 0.25 TABLET ORAL at 21:50

## 2021-01-01 RX ADMIN — DEXAMETHASONE SODIUM PHOSPHATE 4 MG: 10 INJECTION INTRAMUSCULAR; INTRAVENOUS at 14:01

## 2021-01-01 RX ADMIN — SODIUM CHLORIDE, PRESERVATIVE FREE 10 ML: 5 INJECTION INTRAVENOUS at 20:05

## 2021-01-01 RX ADMIN — APIXABAN 2.5 MG: 2.5 TABLET, FILM COATED ORAL at 20:04

## 2021-01-01 RX ADMIN — SODIUM CHLORIDE 500 ML: 9 INJECTION, SOLUTION INTRAVENOUS at 14:22

## 2021-01-01 RX ADMIN — SPIRONOLACTONE 25 MG: 25 TABLET ORAL at 08:47

## 2021-01-01 RX ADMIN — SODIUM CHLORIDE, PRESERVATIVE FREE 10 ML: 5 INJECTION INTRAVENOUS at 21:05

## 2021-01-01 RX ADMIN — IOPAMIDOL 110 ML: 755 INJECTION, SOLUTION INTRAVENOUS at 14:04

## 2021-01-01 RX ADMIN — DILTIAZEM HYDROCHLORIDE 5 MG/HR: 5 INJECTION INTRAVENOUS at 11:34

## 2021-01-01 RX ADMIN — PANTOPRAZOLE SODIUM 40 MG: 40 INJECTION, POWDER, FOR SOLUTION INTRAVENOUS at 07:37

## 2021-01-01 RX ADMIN — CLOPIDOGREL BISULFATE 75 MG: 75 TABLET, FILM COATED ORAL at 08:38

## 2021-01-01 RX ADMIN — CLOPIDOGREL BISULFATE 75 MG: 75 TABLET, FILM COATED ORAL at 10:04

## 2021-01-01 RX ADMIN — HEPARIN SODIUM 1200 UNITS: 1000 INJECTION INTRAVENOUS; SUBCUTANEOUS at 04:36

## 2021-01-01 RX ADMIN — APIXABAN 2.5 MG: 5 TABLET, FILM COATED ORAL at 21:59

## 2021-01-01 RX ADMIN — CHOLESTYRAMINE 4 G: 4 POWDER, FOR SUSPENSION ORAL at 12:21

## 2021-01-01 RX ADMIN — DILTIAZEM HYDROCHLORIDE 10 MG: 5 INJECTION INTRAVENOUS at 11:31

## 2021-01-01 RX ADMIN — SODIUM CHLORIDE, PRESERVATIVE FREE 10 ML: 5 INJECTION INTRAVENOUS at 08:24

## 2021-01-01 RX ADMIN — METOPROLOL SUCCINATE 25 MG: 50 TABLET, FILM COATED, EXTENDED RELEASE ORAL at 21:07

## 2021-01-01 RX ADMIN — SODIUM CHLORIDE, PRESERVATIVE FREE 10 ML: 5 INJECTION INTRAVENOUS at 22:00

## 2021-01-01 RX ADMIN — APIXABAN 2.5 MG: 5 TABLET, FILM COATED ORAL at 08:47

## 2021-01-01 RX ADMIN — APIXABAN 2.5 MG: 2.5 TABLET, FILM COATED ORAL at 22:02

## 2021-01-01 RX ADMIN — SODIUM CHLORIDE, PRESERVATIVE FREE 10 ML: 5 INJECTION INTRAVENOUS at 08:34

## 2021-01-01 RX ADMIN — ONDANSETRON 4 MG: 2 INJECTION INTRAMUSCULAR; INTRAVENOUS at 09:12

## 2021-01-01 RX ADMIN — CLOPIDOGREL BISULFATE 75 MG: 75 TABLET ORAL at 10:19

## 2021-01-01 RX ADMIN — SPIRONOLACTONE 25 MG: 25 TABLET ORAL at 09:50

## 2021-01-01 RX ADMIN — METOPROLOL SUCCINATE 100 MG: 50 TABLET, EXTENDED RELEASE ORAL at 10:18

## 2021-01-01 RX ADMIN — LORAZEPAM 0.25 MG: 2 INJECTION INTRAMUSCULAR; INTRAVENOUS at 15:11

## 2021-01-01 RX ADMIN — SODIUM CHLORIDE: 9 INJECTION, SOLUTION INTRAVENOUS at 06:17

## 2021-01-01 RX ADMIN — SPIRONOLACTONE 25 MG: 25 TABLET ORAL at 10:54

## 2021-01-01 RX ADMIN — METOPROLOL SUCCINATE 100 MG: 50 TABLET, EXTENDED RELEASE ORAL at 21:48

## 2021-01-01 RX ADMIN — LORAZEPAM 0.5 MG: 2 INJECTION INTRAMUSCULAR; INTRAVENOUS at 11:29

## 2021-01-01 RX ADMIN — DIPHENHYDRAMINE HYDROCHLORIDE 25 MG: 50 INJECTION, SOLUTION INTRAMUSCULAR; INTRAVENOUS at 11:38

## 2021-01-01 RX ADMIN — SODIUM CHLORIDE, PRESERVATIVE FREE 10 ML: 5 INJECTION INTRAVENOUS at 08:40

## 2021-01-01 RX ADMIN — ONDANSETRON 4 MG: 2 INJECTION INTRAMUSCULAR; INTRAVENOUS at 09:56

## 2021-01-01 RX ADMIN — METOPROLOL TARTRATE 5 MG: 5 INJECTION, SOLUTION INTRAVENOUS at 06:43

## 2021-01-01 RX ADMIN — METOPROLOL SUCCINATE 25 MG: 50 TABLET, FILM COATED, EXTENDED RELEASE ORAL at 21:59

## 2021-01-01 RX ADMIN — CHOLESTYRAMINE 4 G: 4 POWDER, FOR SUSPENSION ORAL at 20:05

## 2021-01-01 RX ADMIN — SODIUM CHLORIDE, PRESERVATIVE FREE 10 ML: 5 INJECTION INTRAVENOUS at 10:54

## 2021-01-01 RX ADMIN — SODIUM CHLORIDE, PRESERVATIVE FREE 10 ML: 5 INJECTION INTRAVENOUS at 21:17

## 2021-01-01 RX ADMIN — SODIUM CHLORIDE: 9 INJECTION, SOLUTION INTRAVENOUS at 09:07

## 2021-01-01 RX ADMIN — SODIUM CHLORIDE, PRESERVATIVE FREE 10 ML: 5 INJECTION INTRAVENOUS at 09:45

## 2021-01-01 RX ADMIN — METOPROLOL SUCCINATE 25 MG: 25 TABLET, EXTENDED RELEASE ORAL at 00:12

## 2021-01-01 RX ADMIN — APIXABAN 2.5 MG: 5 TABLET, FILM COATED ORAL at 08:38

## 2021-01-01 RX ADMIN — DILTIAZEM HYDROCHLORIDE 30 MG: 60 TABLET, FILM COATED ORAL at 12:07

## 2021-01-01 RX ADMIN — SODIUM CHLORIDE, PRESERVATIVE FREE 10 ML: 5 INJECTION INTRAVENOUS at 10:04

## 2021-01-01 RX ADMIN — ATORVASTATIN CALCIUM 20 MG: 10 TABLET, FILM COATED ORAL at 21:07

## 2021-01-01 RX ADMIN — SODIUM CHLORIDE: 9 INJECTION, SOLUTION INTRAVENOUS at 04:02

## 2021-01-01 RX ADMIN — LORAZEPAM 0.25 MG: 2 INJECTION INTRAMUSCULAR; INTRAVENOUS at 08:23

## 2021-01-01 RX ADMIN — HYDRALAZINE HYDROCHLORIDE 5 MG: 20 INJECTION INTRAMUSCULAR; INTRAVENOUS at 23:07

## 2021-01-01 RX ADMIN — ACETAMINOPHEN 650 MG: 325 TABLET ORAL at 20:34

## 2021-01-01 RX ADMIN — DILTIAZEM HYDROCHLORIDE 30 MG: 60 TABLET, FILM COATED ORAL at 19:00

## 2021-01-01 RX ADMIN — ROCURONIUM BROMIDE 30 MG: 10 SOLUTION INTRAVENOUS at 14:01

## 2021-01-01 RX ADMIN — SODIUM CHLORIDE: 9 INJECTION, SOLUTION INTRAVENOUS at 20:11

## 2021-01-01 RX ADMIN — DOCUSATE SODIUM 50 MG AND SENNOSIDES 8.6 MG 2 TABLET: 8.6; 5 TABLET, FILM COATED ORAL at 16:02

## 2021-01-01 RX ADMIN — PANTOPRAZOLE SODIUM 40 MG: 40 INJECTION, POWDER, FOR SOLUTION INTRAVENOUS at 08:24

## 2021-01-01 RX ADMIN — CLOPIDOGREL BISULFATE 75 MG: 75 TABLET ORAL at 21:51

## 2021-01-01 RX ADMIN — SODIUM CHLORIDE, PRESERVATIVE FREE 10 ML: 5 INJECTION INTRAVENOUS at 21:45

## 2021-01-01 RX ADMIN — METOPROLOL SUCCINATE 25 MG: 50 TABLET, FILM COATED, EXTENDED RELEASE ORAL at 21:19

## 2021-01-01 RX ADMIN — METOPROLOL SUCCINATE 100 MG: 50 TABLET, FILM COATED, EXTENDED RELEASE ORAL at 08:47

## 2021-01-01 RX ADMIN — METOPROLOL SUCCINATE 100 MG: 50 TABLET, FILM COATED, EXTENDED RELEASE ORAL at 10:53

## 2021-01-01 RX ADMIN — CHOLESTYRAMINE 4 G: 4 POWDER, FOR SUSPENSION ORAL at 09:48

## 2021-01-01 RX ADMIN — HEPARIN SODIUM 4.9 ML/HR: 10000 INJECTION, SOLUTION INTRAVENOUS at 22:30

## 2021-01-01 RX ADMIN — CLOPIDOGREL BISULFATE 75 MG: 75 TABLET, FILM COATED ORAL at 08:46

## 2021-01-01 RX ADMIN — LORAZEPAM 0.25 MG: 2 INJECTION INTRAMUSCULAR; INTRAVENOUS at 11:22

## 2021-01-01 RX ADMIN — IOPAMIDOL 75 ML: 755 INJECTION, SOLUTION INTRAVENOUS at 12:05

## 2021-01-01 RX ADMIN — SPIRONOLACTONE 25 MG: 25 TABLET ORAL at 12:08

## 2021-01-01 RX ADMIN — SODIUM CHLORIDE, PRESERVATIVE FREE 10 ML: 5 INJECTION INTRAVENOUS at 21:06

## 2021-01-01 RX ADMIN — FENTANYL CITRATE 25 MCG: 50 INJECTION INTRAMUSCULAR; INTRAVENOUS at 14:32

## 2021-01-01 RX ADMIN — ALPRAZOLAM 0.25 MG: 0.25 TABLET ORAL at 17:11

## 2021-01-01 RX ADMIN — ATORVASTATIN CALCIUM 20 MG: 10 TABLET, FILM COATED ORAL at 20:04

## 2021-01-01 RX ADMIN — LIDOCAINE HYDROCHLORIDE 40 MG: 20 INJECTION, SOLUTION INFILTRATION; PERINEURAL at 14:01

## 2021-01-01 RX ADMIN — ATORVASTATIN CALCIUM 10 MG: 10 TABLET, FILM COATED ORAL at 20:26

## 2021-01-01 RX ADMIN — APIXABAN 2.5 MG: 5 TABLET, FILM COATED ORAL at 20:06

## 2021-01-01 RX ADMIN — SPIRONOLACTONE 25 MG: 25 TABLET ORAL at 21:55

## 2021-01-01 RX ADMIN — SODIUM CHLORIDE: 9 INJECTION, SOLUTION INTRAVENOUS at 12:57

## 2021-01-01 RX ADMIN — FENTANYL CITRATE 12.5 MCG: 50 INJECTION INTRAMUSCULAR; INTRAVENOUS at 14:01

## 2021-01-01 RX ADMIN — ACETAMINOPHEN 650 MG: 325 TABLET ORAL at 10:18

## 2021-01-01 RX ADMIN — PROPOFOL 40 MG: 10 INJECTION, EMULSION INTRAVENOUS at 14:01

## 2021-01-01 RX ADMIN — APIXABAN 2.5 MG: 2.5 TABLET, FILM COATED ORAL at 08:50

## 2021-01-01 RX ADMIN — LORAZEPAM 0.5 MG: 2 INJECTION INTRAMUSCULAR; INTRAVENOUS at 10:31

## 2021-01-01 RX ADMIN — METOPROLOL SUCCINATE 25 MG: 25 TABLET, EXTENDED RELEASE ORAL at 20:04

## 2021-01-01 RX ADMIN — SODIUM CHLORIDE, PRESERVATIVE FREE 10 ML: 5 INJECTION INTRAVENOUS at 20:51

## 2021-01-01 RX ADMIN — METOPROLOL TARTRATE 10 MG: 5 INJECTION INTRAVENOUS at 18:11

## 2021-01-01 RX ADMIN — FOLIC ACID 500 MCG: 1 TABLET ORAL at 09:44

## 2021-01-01 RX ADMIN — SODIUM CHLORIDE 500 ML: 9 INJECTION, SOLUTION INTRAVENOUS at 07:41

## 2021-01-01 RX ADMIN — DILTIAZEM HYDROCHLORIDE 120 MG: 120 CAPSULE, COATED, EXTENDED RELEASE ORAL at 10:18

## 2021-01-01 RX ADMIN — CLOPIDOGREL BISULFATE 75 MG: 75 TABLET ORAL at 09:50

## 2021-01-01 RX ADMIN — ATORVASTATIN CALCIUM 20 MG: 10 TABLET, FILM COATED ORAL at 21:26

## 2021-01-01 RX ADMIN — LORAZEPAM 0.5 MG: 2 INJECTION INTRAMUSCULAR; INTRAVENOUS at 21:37

## 2021-01-01 RX ADMIN — DEXTROSE MONOHYDRATE 150 MG: 50 INJECTION, SOLUTION INTRAVENOUS at 07:19

## 2021-01-01 RX ADMIN — SPIRONOLACTONE 25 MG: 25 TABLET ORAL at 08:38

## 2021-01-01 RX ADMIN — APIXABAN 2.5 MG: 5 TABLET, FILM COATED ORAL at 12:17

## 2021-01-01 RX ADMIN — FOLIC ACID 500 MCG: 1 TABLET ORAL at 08:49

## 2021-01-01 RX ADMIN — SODIUM CHLORIDE, PRESERVATIVE FREE 10 ML: 5 INJECTION INTRAVENOUS at 08:50

## 2021-01-01 RX ADMIN — SODIUM CHLORIDE 1000 ML: 9 INJECTION, SOLUTION INTRAVENOUS at 09:56

## 2021-01-01 RX ADMIN — METOPROLOL SUCCINATE 25 MG: 50 TABLET, FILM COATED, EXTENDED RELEASE ORAL at 21:06

## 2021-01-01 RX ADMIN — ACETAMINOPHEN 650 MG: 325 TABLET ORAL at 09:58

## 2021-01-01 RX ADMIN — PANTOPRAZOLE SODIUM 40 MG: 40 INJECTION, POWDER, FOR SOLUTION INTRAVENOUS at 08:50

## 2021-01-01 RX ADMIN — ATORVASTATIN CALCIUM 10 MG: 10 TABLET, FILM COATED ORAL at 10:19

## 2021-01-01 RX ADMIN — SUGAMMADEX 200 MG: 100 INJECTION, SOLUTION INTRAVENOUS at 15:39

## 2021-01-01 RX ADMIN — ONDANSETRON 4 MG: 2 INJECTION INTRAMUSCULAR; INTRAVENOUS at 10:20

## 2021-01-01 RX ADMIN — ATORVASTATIN CALCIUM 20 MG: 10 TABLET, FILM COATED ORAL at 21:05

## 2021-01-01 RX ADMIN — METOPROLOL SUCCINATE 25 MG: 50 TABLET, FILM COATED, EXTENDED RELEASE ORAL at 20:05

## 2021-01-01 RX ADMIN — METOPROLOL SUCCINATE 100 MG: 50 TABLET, FILM COATED, EXTENDED RELEASE ORAL at 10:03

## 2021-01-01 RX ADMIN — FOLIC ACID 500 MCG: 1 TABLET ORAL at 08:24

## 2021-01-01 RX ADMIN — ACETAMINOPHEN 650 MG: 325 TABLET ORAL at 04:48

## 2021-01-01 RX ADMIN — SODIUM CHLORIDE, SODIUM LACTATE, POTASSIUM CHLORIDE, AND CALCIUM CHLORIDE: .6; .31; .03; .02 INJECTION, SOLUTION INTRAVENOUS at 13:55

## 2021-01-01 RX ADMIN — FENTANYL CITRATE 12.5 MCG: 50 INJECTION INTRAMUSCULAR; INTRAVENOUS at 13:54

## 2021-01-01 RX ADMIN — DILTIAZEM HYDROCHLORIDE 120 MG: 120 CAPSULE, COATED, EXTENDED RELEASE ORAL at 09:50

## 2021-01-01 RX ADMIN — BISACODYL 10 MG: 5 TABLET, COATED ORAL at 15:57

## 2021-01-01 RX ADMIN — APIXABAN 2.5 MG: 2.5 TABLET, FILM COATED ORAL at 09:50

## 2021-01-01 RX ADMIN — METOPROLOL SUCCINATE 100 MG: 50 TABLET, EXTENDED RELEASE ORAL at 09:44

## 2021-01-01 RX ADMIN — APIXABAN 2.5 MG: 2.5 TABLET, FILM COATED ORAL at 08:24

## 2021-01-01 RX ADMIN — SODIUM CHLORIDE: 9 INJECTION, SOLUTION INTRAVENOUS at 22:00

## 2021-01-01 RX ADMIN — SODIUM CHLORIDE: 9 INJECTION, SOLUTION INTRAVENOUS at 03:43

## 2021-01-01 RX ADMIN — ATORVASTATIN CALCIUM 20 MG: 10 TABLET, FILM COATED ORAL at 20:06

## 2021-01-01 RX ADMIN — APIXABAN 2.5 MG: 2.5 TABLET, FILM COATED ORAL at 09:44

## 2021-01-01 RX ADMIN — APIXABAN 2.5 MG: 5 TABLET, FILM COATED ORAL at 21:19

## 2021-01-01 RX ADMIN — METOPROLOL SUCCINATE 25 MG: 50 TABLET, FILM COATED, EXTENDED RELEASE ORAL at 21:26

## 2021-01-01 RX ADMIN — APIXABAN 2.5 MG: 2.5 TABLET, FILM COATED ORAL at 20:26

## 2021-01-01 RX ADMIN — PANTOPRAZOLE SODIUM 40 MG: 40 INJECTION, POWDER, FOR SOLUTION INTRAVENOUS at 09:48

## 2021-01-01 RX ADMIN — ALPRAZOLAM 0.25 MG: 0.25 TABLET ORAL at 09:50

## 2021-01-01 RX ADMIN — APIXABAN 2.5 MG: 5 TABLET, FILM COATED ORAL at 21:06

## 2021-01-01 RX ADMIN — SODIUM CHLORIDE: 9 INJECTION, SOLUTION INTRAVENOUS at 17:11

## 2021-01-01 RX ADMIN — SPIRONOLACTONE 25 MG: 25 TABLET ORAL at 10:03

## 2021-01-01 RX ADMIN — ALPRAZOLAM 0.25 MG: 0.25 TABLET ORAL at 10:18

## 2021-01-01 RX ADMIN — LORAZEPAM 0.25 MG: 2 INJECTION INTRAMUSCULAR; INTRAVENOUS at 08:24

## 2021-01-01 RX ADMIN — ONDANSETRON 4 MG: 2 INJECTION INTRAMUSCULAR; INTRAVENOUS at 13:54

## 2021-01-01 RX ADMIN — METOCLOPRAMIDE HYDROCHLORIDE 5 MG: 5 INJECTION INTRAMUSCULAR; INTRAVENOUS at 11:39

## 2021-01-01 RX ADMIN — SODIUM CHLORIDE: 9 INJECTION, SOLUTION INTRAVENOUS at 21:17

## 2021-01-01 RX ADMIN — DILTIAZEM HYDROCHLORIDE 120 MG: 120 CAPSULE, COATED, EXTENDED RELEASE ORAL at 21:51

## 2021-01-01 RX ADMIN — LORAZEPAM 0.5 MG: 2 INJECTION INTRAMUSCULAR; INTRAVENOUS at 21:51

## 2021-01-01 RX ADMIN — APIXABAN 2.5 MG: 5 TABLET, FILM COATED ORAL at 21:26

## 2021-01-01 RX ADMIN — Medication 40 MCG: at 14:19

## 2021-01-01 RX ADMIN — POTASSIUM CHLORIDE, DEXTROSE MONOHYDRATE AND SODIUM CHLORIDE: 150; 5; 450 INJECTION, SOLUTION INTRAVENOUS at 16:32

## 2021-01-01 RX ADMIN — METOPROLOL SUCCINATE 100 MG: 50 TABLET, FILM COATED, EXTENDED RELEASE ORAL at 12:08

## 2021-01-01 RX ADMIN — ALPRAZOLAM 0.25 MG: 0.25 TABLET ORAL at 16:02

## 2021-01-01 RX ADMIN — IOPAMIDOL 75 ML: 755 INJECTION, SOLUTION INTRAVENOUS at 10:36

## 2021-01-01 RX ADMIN — FENTANYL CITRATE 25 MCG: 50 INJECTION INTRAMUSCULAR; INTRAVENOUS at 15:22

## 2021-01-01 RX ADMIN — DILTIAZEM HYDROCHLORIDE 10 MG: 5 INJECTION INTRAVENOUS at 21:05

## 2021-01-01 RX ADMIN — PANTOPRAZOLE SODIUM 40 MG: 40 INJECTION, POWDER, FOR SOLUTION INTRAVENOUS at 09:47

## 2021-01-01 RX ADMIN — MORPHINE SULFATE 2 MG: 2 INJECTION, SOLUTION INTRAMUSCULAR; INTRAVENOUS at 12:25

## 2021-01-01 RX ADMIN — METOPROLOL SUCCINATE 100 MG: 50 TABLET, EXTENDED RELEASE ORAL at 08:50

## 2021-01-01 RX ADMIN — ATORVASTATIN CALCIUM 10 MG: 10 TABLET, FILM COATED ORAL at 21:55

## 2021-01-01 RX ADMIN — APIXABAN 2.5 MG: 2.5 TABLET, FILM COATED ORAL at 21:05

## 2021-01-01 RX ADMIN — METOPROLOL TARTRATE 5 MG: 5 INJECTION INTRAVENOUS at 06:07

## 2021-01-01 RX ADMIN — MORPHINE SULFATE 2 MG: 2 INJECTION, SOLUTION INTRAMUSCULAR; INTRAVENOUS at 09:07

## 2021-01-01 RX ADMIN — FENTANYL CITRATE 25 MCG: 50 INJECTION INTRAMUSCULAR; INTRAVENOUS at 15:48

## 2021-01-01 RX ADMIN — SODIUM CHLORIDE, PRESERVATIVE FREE 10 ML: 5 INJECTION INTRAVENOUS at 22:31

## 2021-01-01 RX ADMIN — DOCUSATE SODIUM 50 MG AND SENNOSIDES 8.6 MG 2 TABLET: 8.6; 5 TABLET, FILM COATED ORAL at 00:20

## 2021-01-01 RX ADMIN — ATORVASTATIN CALCIUM 20 MG: 10 TABLET, FILM COATED ORAL at 21:19

## 2021-01-01 RX ADMIN — SODIUM PHOSPHATE, MONOBASIC, MONOHYDRATE AND SODIUM PHOSPHATE, DIBASIC, ANHYDROUS 20 MMOL: 276; 142 INJECTION, SOLUTION INTRAVENOUS at 10:03

## 2021-01-01 RX ADMIN — APIXABAN 2.5 MG: 2.5 TABLET, FILM COATED ORAL at 10:19

## 2021-01-01 RX ADMIN — APIXABAN 2.5 MG: 2.5 TABLET, FILM COATED ORAL at 21:46

## 2021-01-01 RX ADMIN — SODIUM CHLORIDE: 9 INJECTION, SOLUTION INTRAVENOUS at 10:33

## 2021-01-01 RX ADMIN — ATORVASTATIN CALCIUM 20 MG: 10 TABLET, FILM COATED ORAL at 21:59

## 2021-01-01 RX ADMIN — ONDANSETRON 4 MG: 2 INJECTION INTRAMUSCULAR; INTRAVENOUS at 01:13

## 2021-01-01 RX ADMIN — METOPROLOL SUCCINATE 100 MG: 50 TABLET, EXTENDED RELEASE ORAL at 08:24

## 2021-01-01 RX ADMIN — CLOPIDOGREL BISULFATE 75 MG: 75 TABLET, FILM COATED ORAL at 12:18

## 2021-01-01 RX ADMIN — METOPROLOL SUCCINATE 100 MG: 50 TABLET, EXTENDED RELEASE ORAL at 09:49

## 2021-01-01 RX ADMIN — SODIUM CHLORIDE: 9 INJECTION, SOLUTION INTRAVENOUS at 20:58

## 2021-01-01 RX ADMIN — SODIUM CHLORIDE, PRESERVATIVE FREE 10 ML: 5 INJECTION INTRAVENOUS at 08:48

## 2021-01-01 RX ADMIN — SODIUM CHLORIDE, PRESERVATIVE FREE 10 ML: 5 INJECTION INTRAVENOUS at 07:37

## 2021-01-01 ASSESSMENT — ENCOUNTER SYMPTOMS
VOMITING: 0
SHORTNESS OF BREATH: 0
FACIAL SWELLING: 0
BACK PAIN: 0
NAUSEA: 0
COUGH: 0
ALLERGIC/IMMUNOLOGIC NEGATIVE: 1
NAUSEA: 0
EYES NEGATIVE: 1
CHEST TIGHTNESS: 0
BACK PAIN: 0
SHORTNESS OF BREATH: 0
ABDOMINAL PAIN: 1
DIARRHEA: 0
COUGH: 0
NAUSEA: 1
ABDOMINAL PAIN: 0
VOMITING: 0
RESPIRATORY NEGATIVE: 1
NAUSEA: 1
SORE THROAT: 0
VOMITING: 0
CONSTIPATION: 1
EYE PAIN: 0
SINUS PRESSURE: 0
SORE THROAT: 0
SHORTNESS OF BREATH: 1
SORE THROAT: 0
TROUBLE SWALLOWING: 1
COUGH: 0
FACIAL SWELLING: 0
ABDOMINAL PAIN: 1
VOMITING: 1
DIARRHEA: 0
COUGH: 0
SINUS PRESSURE: 0

## 2021-01-01 ASSESSMENT — PAIN SCALES - GENERAL
PAINLEVEL_OUTOF10: 0
PAINLEVEL_OUTOF10: 10
PAINLEVEL_OUTOF10: 0
PAINLEVEL_OUTOF10: 2
PAINLEVEL_OUTOF10: 0
PAINLEVEL_OUTOF10: 10
PAINLEVEL_OUTOF10: 0
PAINLEVEL_OUTOF10: 4
PAINLEVEL_OUTOF10: 0
PAINLEVEL_OUTOF10: 3
PAINLEVEL_OUTOF10: 0
PAINLEVEL_OUTOF10: 0
PAINLEVEL_OUTOF10: 4
PAINLEVEL_OUTOF10: 0
PAINLEVEL_OUTOF10: 7
PAINLEVEL_OUTOF10: 3
PAINLEVEL_OUTOF10: 0
PAINLEVEL_OUTOF10: 3
PAINLEVEL_OUTOF10: 10
PAINLEVEL_OUTOF10: 3
PAINLEVEL_OUTOF10: 10
PAINLEVEL_OUTOF10: 0
PAINLEVEL_OUTOF10: 10
PAINLEVEL_OUTOF10: 3
PAINLEVEL_OUTOF10: 4
PAINLEVEL_OUTOF10: 3
PAINLEVEL_OUTOF10: 0
PAINLEVEL_OUTOF10: 3
PAINLEVEL_OUTOF10: 0
PAINLEVEL_OUTOF10: 0

## 2021-01-01 ASSESSMENT — PULMONARY FUNCTION TESTS
PIF_VALUE: 25
PIF_VALUE: 1
PIF_VALUE: 16
PIF_VALUE: 24
PIF_VALUE: 22
PIF_VALUE: 26
PIF_VALUE: 12
PIF_VALUE: 18
PIF_VALUE: 0
PIF_VALUE: 0
PIF_VALUE: 26
PIF_VALUE: 22
PIF_VALUE: 22
PIF_VALUE: 26
PIF_VALUE: 23
PIF_VALUE: 20
PIF_VALUE: 18
PIF_VALUE: 16
PIF_VALUE: 18
PIF_VALUE: 27
PIF_VALUE: 25
PIF_VALUE: 0
PIF_VALUE: 15
PIF_VALUE: 26
PIF_VALUE: 25
PIF_VALUE: 19
PIF_VALUE: 18
PIF_VALUE: 21
PIF_VALUE: 18
PIF_VALUE: 24
PIF_VALUE: 1
PIF_VALUE: 26
PIF_VALUE: 23
PIF_VALUE: 1
PIF_VALUE: 19
PIF_VALUE: 18
PIF_VALUE: 25
PIF_VALUE: 24
PIF_VALUE: 1
PIF_VALUE: 26
PIF_VALUE: 0
PIF_VALUE: 28
PIF_VALUE: 18
PIF_VALUE: 0
PIF_VALUE: 9
PIF_VALUE: 26
PIF_VALUE: 16
PIF_VALUE: 18
PIF_VALUE: 26
PIF_VALUE: 18
PIF_VALUE: 28
PIF_VALUE: 17
PIF_VALUE: 18
PIF_VALUE: 26
PIF_VALUE: 27
PIF_VALUE: 28
PIF_VALUE: 18
PIF_VALUE: 25
PIF_VALUE: 13
PIF_VALUE: 23
PIF_VALUE: 2
PIF_VALUE: 28
PIF_VALUE: 19
PIF_VALUE: 25
PIF_VALUE: 23
PIF_VALUE: 25
PIF_VALUE: 24
PIF_VALUE: 3
PIF_VALUE: 0
PIF_VALUE: 3
PIF_VALUE: 28
PIF_VALUE: 4
PIF_VALUE: 27
PIF_VALUE: 1
PIF_VALUE: 27
PIF_VALUE: 18
PIF_VALUE: 26
PIF_VALUE: 27
PIF_VALUE: 0
PIF_VALUE: 0
PIF_VALUE: 19
PIF_VALUE: 26
PIF_VALUE: 18
PIF_VALUE: 26
PIF_VALUE: 23
PIF_VALUE: 18
PIF_VALUE: 24
PIF_VALUE: 20
PIF_VALUE: 17
PIF_VALUE: 3
PIF_VALUE: 26
PIF_VALUE: 26
PIF_VALUE: 18
PIF_VALUE: 20
PIF_VALUE: 3
PIF_VALUE: 12
PIF_VALUE: 0
PIF_VALUE: 18
PIF_VALUE: 20
PIF_VALUE: 18
PIF_VALUE: 2
PIF_VALUE: 24
PIF_VALUE: 25
PIF_VALUE: 0
PIF_VALUE: 0
PIF_VALUE: 27
PIF_VALUE: 18
PIF_VALUE: 26
PIF_VALUE: 26
PIF_VALUE: 4
PIF_VALUE: 22
PIF_VALUE: 18
PIF_VALUE: 28
PIF_VALUE: 26
PIF_VALUE: 0
PIF_VALUE: 28
PIF_VALUE: 35
PIF_VALUE: 1
PIF_VALUE: 3

## 2021-01-01 ASSESSMENT — PAIN SCALES - WONG BAKER
WONGBAKER_NUMERICALRESPONSE: 4
WONGBAKER_NUMERICALRESPONSE: 0
WONGBAKER_NUMERICALRESPONSE: 0

## 2021-01-01 ASSESSMENT — PAIN DESCRIPTION - LOCATION
LOCATION: ABDOMEN
LOCATION: RECTUM
LOCATION: NECK

## 2021-01-01 ASSESSMENT — PATIENT HEALTH QUESTIONNAIRE - PHQ9
1. LITTLE INTEREST OR PLEASURE IN DOING THINGS: 0
2. FEELING DOWN, DEPRESSED OR HOPELESS: 1
SUM OF ALL RESPONSES TO PHQ QUESTIONS 1-9: 1
SUM OF ALL RESPONSES TO PHQ9 QUESTIONS 1 & 2: 1

## 2021-01-01 ASSESSMENT — PAIN - FUNCTIONAL ASSESSMENT: PAIN_FUNCTIONAL_ASSESSMENT: 0-10

## 2021-01-01 ASSESSMENT — LIFESTYLE VARIABLES: SMOKING_STATUS: 0

## 2021-01-01 ASSESSMENT — PAIN DESCRIPTION - PAIN TYPE: TYPE: ACUTE PAIN

## 2021-01-04 NOTE — PROGRESS NOTES
Lynda Cota  1940      HPI:  Chief Complaint   Patient presents with    Herpes Zoster     getting better , only one spot left , decrease on gabapentin.  Weight Loss     drinking boost     Urinary Frequency     wants urine checked. Dx shingles ~2 months ago. Gabapentin 1 pill AM and PM. She has been able to decrease this medication dose. Shingles pain has improved but one little spot that is still bothersome. She is ready to continue to wean off medication. Pt has noticed some odor to urine. She has seen Dr Leti Villatoro for Urology. Weight loss in the past but she is trying to drink an Ensure/Boost daily. Continues to feel gassy and bloating. She has not had GI procedure as not recommended to stop blood thinner. /84 (Site: Right Upper Arm, Position: Sitting, Cuff Size: Large Adult)   Pulse 78   Temp 98.1 °F (36.7 °C) (Temporal)   Wt 98 lb (44.5 kg)   SpO2 97%   BMI 17.36 kg/m²     Prior to Visit Medications    Medication Sig Taking?  Authorizing Provider   cephALEXin (KEFLEX) 500 MG capsule Take 500 mg by mouth daily Yes Historical Provider, MD   simvastatin (ZOCOR) 20 MG tablet TAKE ONE TABLET BY MOUTH DAILY Yes NED Manuel - CNP   metoprolol succinate (TOPROL XL) 100 MG extended release tablet Take 1 tablet by mouth daily Yes NED Manuel CNP   gabapentin (NEURONTIN) 100 MG capsule TAKE ONE TO THREE CAPSULES BY MOUTH UP TO THREE TIMES A DAY AS NEEDED Yes NED Manuel CNP   spironolactone (ALDACTONE) 25 MG tablet Take 25 mg by mouth daily Yes Historical Provider, MD   apixaban (ELIQUIS) 2.5 MG TABS tablet Take 1 tablet by mouth 2 times daily Yes Zachary Hernandez MD   acetaminophen (TYLENOL) 500 MG tablet Take 500 mg by mouth every 6 hours as needed for Pain Yes Historical Provider, MD   glucosamine-chondroitin 500-400 MG tablet Take by mouth 2 times daily  Yes Historical Provider, MD magnesium (MAGNESIUM-OXIDE) 250 MG TABS tablet Take 250 mg by mouth daily. Yes Historical Provider, MD   Coenzyme Q10 (COQ10) 50 MG CAPS Take  by mouth 2 times daily. Yes Historical Provider, MD   vitamin D 25 MCG (1000 UT) CAPS Take 1,000 Units by mouth daily  Yes Historical Provider, MD   aspirin 81 MG chewable tablet Take 325 mg by mouth daily  Yes Historical Provider, MD   therapeutic multivitamin-minerals (THERAGRAN-M) tablet Take 1 tablet by mouth daily. Yes Historical Provider, MD   vitamin E 400 UNIT capsule Take 400 Units by mouth daily. Yes Historical Provider, MD   Calcium Citrate-Vitamin D 200-250 MG-UNIT TABS Take  by mouth. Yes Historical Provider, MD   Ascorbic Acid (VITAMIN C) 500 MG tablet Take 1,000 mg by mouth daily. Yes Historical Provider, MD   folic acid (FOLVITE) 1 MG tablet Take 400 mcg by mouth daily  Yes Historical Provider, MD   Zinc 50 MG CAPS Take  by mouth. Yes Historical Provider, MD     Family History   Problem Relation Age of Onset    Heart Disease Sister     Stroke Mother      Social History     Socioeconomic History    Marital status:       Spouse name: Not on file    Number of children: Not on file    Years of education: Not on file    Highest education level: Not on file   Occupational History    Not on file   Social Needs    Financial resource strain: Not on file    Food insecurity     Worry: Not on file     Inability: Not on file    Transportation needs     Medical: Not on file     Non-medical: Not on file   Tobacco Use    Smoking status: Never Smoker    Smokeless tobacco: Never Used   Substance and Sexual Activity    Alcohol use: No    Drug use: No    Sexual activity: Not Currently   Lifestyle    Physical activity     Days per week: Not on file     Minutes per session: Not on file    Stress: Not on file   Relationships    Social connections     Talks on phone: Not on file     Gets together: Not on file Attends Denominational service: Not on file     Active member of club or organization: Not on file     Attends meetings of clubs or organizations: Not on file     Relationship status: Not on file    Intimate partner violence     Fear of current or ex partner: Not on file     Emotionally abused: Not on file     Physically abused: Not on file     Forced sexual activity: Not on file   Other Topics Concern    Not on file   Social History Narrative    Not on file       Review of Systems   Constitutional: Positive for appetite change and unexpected weight change. Negative for chills, fatigue and fever. HENT: Negative for congestion, ear discharge, ear pain, facial swelling, hearing loss, sinus pressure, sneezing and sore throat. Respiratory: Negative for cough. Cardiovascular: Negative for chest pain. Gastrointestinal: Negative for diarrhea, nausea and vomiting. Genitourinary: Negative for difficulty urinating, dysuria, hematuria and urgency. Urinary odor   Musculoskeletal: Negative for arthralgias and gait problem. Neurological: Positive for numbness (neuropathy). Negative for dizziness, weakness and headaches. Hematological: Negative for adenopathy. Psychiatric/Behavioral: Negative for sleep disturbance and suicidal ideas. Physical Exam  Vitals signs reviewed. Constitutional:       Comments: underweight   HENT:      Head: Normocephalic. Neck:      Vascular: No carotid bruit. Cardiovascular:      Rate and Rhythm: Normal rate and regular rhythm. Pulses: Normal pulses. Heart sounds: Normal heart sounds. Pulmonary:      Effort: Pulmonary effort is normal.      Breath sounds: Normal breath sounds. Abdominal:      General: Abdomen is flat. Bowel sounds are normal.      Palpations: There is no mass. Tenderness: There is no abdominal tenderness. Neurological:      Mental Status: She is alert.    Psychiatric:         Mood and Affect: Mood normal.

## 2021-01-04 NOTE — PATIENT INSTRUCTIONS
Continue to decrease Gabapentin and wean off medication. Urine culture has been sent to lab - we will call you if there is a positive infection. Consider taking medication that Dr Claudio Morel prescribed in the past for mood. Consider Boost / Ensure from [employer].

## 2021-02-03 NOTE — PROGRESS NOTES
Giancarlo Johnson  1940      HPI:  Chief Complaint   Patient presents with    Herpes Zoster     Much better,     Pneumonia    Other     EGD tomorrow        Pt states she is having EGD performed tomorrow with Jazmine Gallegos. She is having nausea, appetite loss, dysphagia. She is using yogurt to take pills and this works. She is very nervous about the upcoming procedure. Weight is stable. CT 1/25/2021 showed pneumonia and pulmonary nodules they are monitoring. Herpes zoster - resolved, denies concerns. /68 (Site: Left Upper Arm, Position: Sitting, Cuff Size: Small Adult)   Pulse 88   Temp 98 °F (36.7 °C) (Temporal)   Wt 97 lb (44 kg)   SpO2 98%   BMI 17.18 kg/m²     Prior to Visit Medications    Medication Sig Taking? Authorizing Provider   azithromycin (ZITHROMAX) 250 MG tablet Take 250 mg by mouth See Admin Instructions Yes Historical Provider, MD   cephALEXin (KEFLEX) 500 MG capsule Take 500 mg by mouth daily Yes Historical Provider, MD   simvastatin (ZOCOR) 20 MG tablet TAKE ONE TABLET BY MOUTH DAILY Yes NED Manuel - CNP   metoprolol succinate (TOPROL XL) 100 MG extended release tablet Take 1 tablet by mouth daily Yes NED Powell - CNP   spironolactone (ALDACTONE) 25 MG tablet Take 25 mg by mouth daily Yes Historical Provider, MD   apixaban (ELIQUIS) 2.5 MG TABS tablet Take 1 tablet by mouth 2 times daily Yes Carrol Lawler MD   acetaminophen (TYLENOL) 500 MG tablet Take 500 mg by mouth every 6 hours as needed for Pain Yes Historical Provider, MD   glucosamine-chondroitin 500-400 MG tablet Take by mouth 2 times daily  Yes Historical Provider, MD   magnesium (MAGNESIUM-OXIDE) 250 MG TABS tablet Take 250 mg by mouth daily. Yes Historical Provider, MD   Coenzyme Q10 (COQ10) 50 MG CAPS Take  by mouth 2 times daily.  Yes Historical Provider, MD   vitamin D 25 MCG (1000 UT) CAPS Take 1,000 Units by mouth daily  Yes Historical Provider, MD aspirin 81 MG chewable tablet Take 325 mg by mouth daily  Yes Historical Provider, MD   therapeutic multivitamin-minerals (THERAGRAN-M) tablet Take 1 tablet by mouth daily. Yes Historical Provider, MD   vitamin E 400 UNIT capsule Take 400 Units by mouth daily. Yes Historical Provider, MD   Calcium Citrate-Vitamin D 200-250 MG-UNIT TABS Take  by mouth. Yes Historical Provider, MD   Ascorbic Acid (VITAMIN C) 500 MG tablet Take 1,000 mg by mouth daily. Yes Historical Provider, MD   folic acid (FOLVITE) 1 MG tablet Take 400 mcg by mouth daily  Yes Historical Provider, MD   Zinc 50 MG CAPS Take  by mouth. Yes Historical Provider, MD     Family History   Problem Relation Age of Onset    Heart Disease Sister     Stroke Mother      Social History     Socioeconomic History    Marital status:       Spouse name: Not on file    Number of children: Not on file    Years of education: Not on file    Highest education level: Not on file   Occupational History    Not on file   Social Needs    Financial resource strain: Not on file    Food insecurity     Worry: Not on file     Inability: Not on file    Transportation needs     Medical: Not on file     Non-medical: Not on file   Tobacco Use    Smoking status: Never Smoker    Smokeless tobacco: Never Used   Substance and Sexual Activity    Alcohol use: No    Drug use: No    Sexual activity: Not Currently   Lifestyle    Physical activity     Days per week: Not on file     Minutes per session: Not on file    Stress: Not on file   Relationships    Social connections     Talks on phone: Not on file     Gets together: Not on file     Attends Anabaptist service: Not on file     Active member of club or organization: Not on file     Attends meetings of clubs or organizations: Not on file     Relationship status: Not on file    Intimate partner violence     Fear of current or ex partner: Not on file     Emotionally abused: Not on file Physically abused: Not on file     Forced sexual activity: Not on file   Other Topics Concern    Not on file   Social History Narrative    Not on file       Review of Systems   Constitutional: Positive for appetite change. Negative for chills, fatigue, fever and unexpected weight change. HENT: Positive for trouble swallowing. Negative for congestion, ear discharge, ear pain, facial swelling, hearing loss, sinus pressure, sneezing and sore throat. Respiratory: Negative for cough. Cardiovascular: Negative for chest pain. Gastrointestinal: Positive for nausea. Negative for diarrhea and vomiting. Genitourinary: Negative for difficulty urinating, dysuria, hematuria and urgency. Musculoskeletal: Negative for arthralgias and gait problem. Neurological: Negative for dizziness, weakness and headaches. Hematological: Negative for adenopathy. Psychiatric/Behavioral: Negative for sleep disturbance and suicidal ideas. Physical Exam  Vitals signs reviewed. Constitutional:       Comments: underweight   HENT:      Head: Normocephalic. Cardiovascular:      Rate and Rhythm: Normal rate and regular rhythm. Pulses: Normal pulses. Heart sounds: Normal heart sounds. Pulmonary:      Effort: Pulmonary effort is normal.      Breath sounds: Normal breath sounds. Neurological:      General: No focal deficit present. Mental Status: She is alert and oriented to person, place, and time. Psychiatric:      Comments: Good eye contact. Flat affect. Assessment:     1. Neuropathy due to herpes zoster  Resolved    2. Stress bladder incontinence, female  Monitor, f/u urology, consider further testing in future    3. Dysphagia, unspecified type  Worsening, EGD scheduled tomorrow with Dr Raj Patel    4. Nausea  Worsening, EGD scheduled for tomorrow      Plan:    See above plan.      - Consider starting Cymbalta prescribed by Cardiology to help mood - pt hesitant - Discussed possibility of her moving into children's homes but she is not ready at this time    Return in about 6 months (around 8/3/2021) for IO, 30 min appt.     NED Thomson - CNP

## 2021-02-05 NOTE — TELEPHONE ENCOUNTER
Please call patient today and see how she is doing after EGD on Thursday. Did they give her any reason for chronic nausea and loss of appetite? Has she tried starting Cymbalta that was given by Cardiology?

## 2021-02-08 NOTE — TELEPHONE ENCOUNTER
Patient returning call. She said that during the endoscopy,  they took biopsies and results should be back in a week. The OV was being sent to our office. she has not started Cymbalta yet.

## 2021-02-19 NOTE — TELEPHONE ENCOUNTER
Patient followed up with Urology , states they want to do a test to check for blockages that may be causing the frequent infections. She went to set up and is concerned about going through with this since she will need general anesthesia to have it done at [facility]. She states that the keflex is working and at her age would prefer to not take the risk of surgery . Patient  Would like you opinion . Aware that you are out of the office until Monday.

## 2021-02-19 NOTE — TELEPHONE ENCOUNTER
Spoke with urology they said the last OV she had with them was 9/8/2020 which is the one that we have in media.

## 2021-02-19 NOTE — TELEPHONE ENCOUNTER
Can you make sure I have all records from Urology most recent visit to review before speaking with patient.

## 2021-03-08 NOTE — TELEPHONE ENCOUNTER
Patient said that she has been having problems with her digestive system. She feels its a gas problem. She recently had an EGD, and there was not any obstruction. Patient is asking what steps she should take next to try and  figure this thing out?   Call back at [phone]

## 2021-04-12 NOTE — TELEPHONE ENCOUNTER
Please call pt to see how she is doing on Monday. Also, another GI doctor may be able to review CTA.

## 2021-04-12 NOTE — TELEPHONE ENCOUNTER
JOHN:  Mando Dupree calling back to let us know that she has talked to patient several times today, as well as Friday. She is trying to make an appt for patient to see a vascular Doctor who is out in [facility], but patient doesn't want to drive that far for fear of getting lost.  She has appt tomorrow with  (Cardio) , and will see what he has to say about her seeing a vascular DrMarjorie Wilder for thought. .. Patient does have a son Constantine Nassar, who I have spoken to before. I think he would be willing to drive her to that appt in [facility].   Rhina Gu number is [phone]

## 2021-04-12 NOTE — TELEPHONE ENCOUNTER
Spoke to [place] from Dr [facility] office and she said she would have Juliie follow up with the patient. Yolis Carias already called patient and they are sending her to a Vascular doctor . She has appt with her cardiologist tomorrow. She will get name of one from cardiologist.  She wanted me to let you know she appreciates all your he[p and said that Aixa Chris is very helpful also. She said out of all her doctor offices , this one is the best for getting back to her and helping her with answers.

## 2021-04-12 NOTE — TELEPHONE ENCOUNTER
I called Cecilio Manuel, and she has not been feeling well at all, but did not go to the ER over the weekend. She did try to call Dr. Fior Douglas office first thing this morning to get some answers. Marisa Beaulieu said she would call her back on Friday and that did not happen. She also asked if another Doctor could review her results. She is waiting for a call back.

## 2021-04-12 NOTE — TELEPHONE ENCOUNTER
Noted. Please also call over to [facility] RODRIGUEZ and see if Jakob can have someone else read off on this report and make sure they are aware of patient not feeling well.

## 2021-05-20 NOTE — TELEPHONE ENCOUNTER
Karla Cobb from dr [facility] office would like to speak to [facility] regarding possible home health care for daisy. Please call her back when you get a chance at [phone]. She is aware you are out of the office until tomorrow.

## 2021-05-24 NOTE — TELEPHONE ENCOUNTER
DEEPTI Wyatt. Need to know if there are specific  HH needs they are looking for. I can then place orders .

## 2021-05-24 NOTE — TELEPHONE ENCOUNTER
Slade Raymond, we need to get [facility] [address] orders placed. Please see what specifically she needs assistance with at this time? Cardiology stated concerns with overall safety at home and nutrition.

## 2021-05-25 NOTE — TELEPHONE ENCOUNTER
[facility] , printed notes and insurance. Faxed to [employer].

## 2021-06-01 NOTE — TELEPHONE ENCOUNTER
Please call [employer]. Can they set patient up with Meals on Wheels? Do we have samples of Boost? Can [facility] help her find cheaper alternative?

## 2021-06-02 NOTE — TELEPHONE ENCOUNTER
[place]. Spoke with Dao , she states patient called [facility] and canceled all visits. She told them she could handle this all on her own. I will see if I can get boost samples. [phone] (home)   LM to get more information.

## 2021-06-02 NOTE — TELEPHONE ENCOUNTER
Noted.     Yes to Boost samples. Please also get her Meals on Wheels which I think is through [address].

## 2021-06-02 NOTE — TELEPHONE ENCOUNTER
Spoke with patient discussed CO[employer] and Meals on Wheels. She will call the agency and they will help with getting her started. Aware I requested boost samples , I will let her know if they arrive. Instructed she could speak with the pharmacist at [facility] as they have a generic version . They will be able to help her pick out the proper one.

## 2021-06-06 PROBLEM — I77.1 CELIAC ARTERY STENOSIS (HCC): Status: ACTIVE | Noted: 2021-01-01

## 2021-06-06 PROBLEM — I16.0 MALIGNANT HYPERTENSIVE URGENCY: Status: ACTIVE | Noted: 2021-01-01

## 2021-06-06 PROBLEM — K40.30 UNILATERAL INCARCERATED INGUINAL HERNIA: Status: ACTIVE | Noted: 2021-01-01

## 2021-06-06 PROBLEM — I48.0 PAF (PAROXYSMAL ATRIAL FIBRILLATION) (HCC): Status: ACTIVE | Noted: 2020-07-07

## 2021-06-06 PROBLEM — K56.609 SBO (SMALL BOWEL OBSTRUCTION) (HCC): Status: ACTIVE | Noted: 2021-01-01

## 2021-06-06 PROBLEM — I73.9 PAD (PERIPHERAL ARTERY DISEASE) (HCC): Status: ACTIVE | Noted: 2021-01-01

## 2021-06-06 NOTE — CONSULTS
(ELIQUIS) 2.5 MG TABS tablet Take 1 tablet by mouth 2 times daily 7/8/20   Mir Huggins MD   acetaminophen (TYLENOL) 500 MG tablet Take 500 mg by mouth every 6 hours as needed for Pain    Historical Provider, MD   glucosamine-chondroitin 500-400 MG tablet Take by mouth 2 times daily     Historical Provider, MD   magnesium (MAGNESIUM-OXIDE) 250 MG TABS tablet Take 250 mg by mouth daily. Historical Provider, MD   Coenzyme Q10 (COQ10) 50 MG CAPS Take  by mouth 2 times daily. Historical Provider, MD   vitamin D 25 MCG (1000 UT) CAPS Take 1,000 Units by mouth daily     Historical Provider, MD   aspirin 81 MG chewable tablet Take 325 mg by mouth daily     Historical Provider, MD   therapeutic multivitamin-minerals (THERAGRAN-M) tablet Take 1 tablet by mouth daily. Historical Provider, MD   vitamin E 400 UNIT capsule Take 400 Units by mouth daily. Historical Provider, MD   Calcium Citrate-Vitamin D 200-250 MG-UNIT TABS Take  by mouth. Historical Provider, MD   Ascorbic Acid (VITAMIN C) 500 MG tablet Take 1,000 mg by mouth daily. Historical Provider, MD   folic acid (FOLVITE) 1 MG tablet Take 400 mcg by mouth daily     Historical Provider, MD   Zinc 50 MG CAPS Take  by mouth. Historical Provider, MD        Allergies:  Pneumococcal vaccines    Social History:   TOBACCO:   reports that she has never smoked. She has never used smokeless tobacco.  ETOH:   reports no history of alcohol use. DRUGS:   reports no history of drug use. Family History:        Problem Relation Age of Onset    Heart Disease Sister     Stroke Mother        REVIEW OF SYSTEMS:  She reports no complaints related to the eyes, ears , nose throat or mouth. She admits to weight loss. No chest pain. No SOB. No urinary complaints. No musculoskeletal complaints. No skin rashes. No neurologic deficits. No bleeding tendencies.   GI complaints include R groin pain and N/V.    PHYSICAL EXAM:  VITALS:  BP (!) 163/88 Pulse 56   Temp 98.1 °F (36.7 °C) (Oral)   Resp 19   Wt 94 lb (42.6 kg)   SpO2 98%   BMI 16.65 kg/m²     CONSTITUTIONAL:  alert, no apparent distress and cachectic  EYES:  sclera clear  ENT:  normocepalic, without obvious abnormality  NECK:  supple, symmetrical, trachea midline  LUNGS:  clear to auscultation  CARDIOVASCULAR:  regular rate and rhythm   ABDOMEN: non-distended, non-tender,  And soft.   No palpable or visible lump in the R groin in this cachectic female  MUSCULOSKELETAL:  No pitting edema lower extremities  NEUROLOGIC:  Mental Status Exam:  Level of Alertness:   awake  Orientation:   person, place, time  SKIN:  no rashes    DATA:    CBC:   Recent Labs     06/06/21  1218   WBC 6.6   HGB 13.8   HCT 40.9        BMP:    Recent Labs     06/06/21  1218      K 3.8   CL 99   CO2 28   BUN 24*   CREATININE 0.8   GLUCOSE 137*     Hepatic:   Recent Labs     06/06/21  1218   AST 26   ALT 12   BILITOT 0.3   ALKPHOS 101       Radiology Review:  CT with some dilated SB with transition in the R groin    ASSESSMENT:  Incarcerated [facility] - now reduced    PLAN:  IVF  NG  Hold Eliquis  AXR in AM  Allow acute SBO to cool off and hold anticoagulation and then plan for [facility] repair      Sigifredo Walker MD     [address] Surgery

## 2021-06-06 NOTE — H&P
HOSPITALISTS HISTORY AND PHYSICAL    6/7/2021 6:02 AM    Patient Information:  Deshawn Haile is a 78 y.o. female [id number]  PCP:  NED Herring CNP (Tel: [phone] )    Chief complaint:    Chief Complaint   Patient presents with    Abdominal Pain     stent placed in right groin in may, not sure if that is causing her to have abd pain now. History of Present Illness:  Carmelo Galan is a 78 y.o. female who presented to the ED to be evaluated for acutely worsening RLQ abdominal pain ongoing for greater than 1 weeks duration. Patient endorses right inguinal \"bulge\" where she reports vascular surgeon Dr. Pooja Ramesh gained access for mesenteric stent placement on 5/10/2021. Patient denies fever, chills, vomiting, and diarrhea; positive constipation and tenesmus appreciated. Upon arrival to the ED, CTA of the abdominal aorta was performed and notable for right inguinal hernia with possible incarcerated small bowel's content; consistent with evolving SBO. Further mention was made of mesenterorenal arterial disease with patent proximal celiac stent. Labs were obtained revealing mild lipase elevation at 91 as well as small troponin elevation 0.02 possibly due to malignant hypertensive urgency presentation. EKG revealed  left axis deviation, LBBB, and rhythm aberrancy. Hospitalist team consulted to admit this patient for early small bowel obstruction with concomitant malignant hypertensive urgency/troponin leak. History obtained from patient and review of [facility] chart    Old medical records show most recent echo performed October 2020 at [facility]:  Study Conclusions     - Left ventricle: The cavity size is normal. Wall thickness is     normal. Systolic function was moderately reduced. The calculated     ejection fraction was in the range of 35% to 40%. Diffuse     hypokinesis.  Features are consistent with a pseudonormal left     ventricular filling pattern, with concomitant abnormal relaxation     and increased filling pressure (grade 2 diastolic dysfunction).   The stroke volume is 45ml. The stroke index is 31ml/m^2. - Aortic valve: There was mild regurgitation.   - Aorta: The ascending aorta internal dimension in the A-P     direction, maximal systolic dimension is 9.4LQ. - Left atrium: The atrium is moderately dilated. - Inferior vena cava: The vessel was normal in size; the     respirophasic diameter changes were in the normal range (&gt;= 50%);     findings are consistent with normal central venous pressure. REVIEW OF SYSTEMS:   Constitutional: Positive for anorexia/weight loss and generalized weakness  ENT: Negative for rhinorrhea, epistaxis, hoarseness, and sore throat.   Respiratory: Positive for shortness of breath; without wheezing and cough  Cardiovascular: Negative for chest pain, palpitations, and peripheral edema; no orthopnea or PND  Gastrointestinal: Negative for N/V/D; no hematemesis, hematochezia, or melena  Genitourinary: Negative for dysuria, frequency, hesitancy, and urgency; no incontinence  Hematologic/Lymphatic: Positive for bleeding tendency due to twice daily Eliquis, excessive bruising, and enlarged LN  Musculoskeletal: Positive for decreased muscle mass, myalgias and arthalgias; able to ambulate without difficulty  Neurologic: Negative for LOC, seizure activity, paresthesias, dysarthria, vertigo, and gait disturbance  Skin: Healing lesions arms s/p Derm cryotherapy  Psychiatric: Negative for depression,anxiety, and agitation; denies SI/HI  Endocrine: Negative for polyuria/polydipsia/polyphagia; no heat/cold intolerance    Past Medical History:   has a past medical history of CAD (coronary artery disease), Cancer ([facility] [address] Carotid artery disease ([facility] [address] HTN (hypertension), Hyperlipemia, Hyperlipidemia, LBP (low back pain), Lung nodules, Osteoporosis, Seasonal allergies, Stress bladder incontinence, female, Unspecified cerebral artery occlusion with cerebral infarction, and Wears glasses. Past Surgical History:   has a past surgical history that includes Varicose vein surgery; Cardiac catheterization; and Colonoscopy (8/13/2014). Medications:  No current facility-administered medications on file prior to encounter. Current Outpatient Medications on File Prior to Encounter   Medication Sig Dispense Refill    clopidogrel (PLAVIX) 75 MG tablet Take 75 mg by mouth daily      simvastatin (ZOCOR) 20 MG tablet TAKE ONE TABLET BY MOUTH DAILY 90 tablet 3    metoprolol succinate (TOPROL XL) 100 MG extended release tablet Take 1 tablet by mouth daily 90 tablet 3    spironolactone (ALDACTONE) 25 MG tablet Take 25 mg by mouth daily      apixaban (ELIQUIS) 2.5 MG TABS tablet Take 1 tablet by mouth 2 times daily 60 tablet 0    acetaminophen (TYLENOL) 500 MG tablet Take 500 mg by mouth every 6 hours as needed for Pain      magnesium (MAGNESIUM-OXIDE) 250 MG TABS tablet Take 250 mg by mouth daily.  Coenzyme Q10 (COQ10) 50 MG CAPS Take  by mouth 2 times daily.  vitamin D 25 MCG (1000 UT) CAPS Take 1,000 Units by mouth daily       vitamin E 400 UNIT capsule Take 400 Units by mouth daily.  Calcium Citrate-Vitamin D 200-250 MG-UNIT TABS Take  by mouth.  Ascorbic Acid (VITAMIN C) 500 MG tablet Take 1,000 mg by mouth daily.  folic acid (FOLVITE) 1 MG tablet Take 400 mcg by mouth daily       Zinc 50 MG CAPS Take  by mouth.         Meals on Wheels MISC by Does not apply route 1 each 0    azithromycin (ZITHROMAX) 250 MG tablet Take 250 mg by mouth See Admin Instructions      cephALEXin (KEFLEX) 500 MG capsule Take 500 mg by mouth daily      glucosamine-chondroitin 500-400 MG tablet Take by mouth 2 times daily       aspirin 81 MG chewable tablet Take 325 mg by mouth daily       therapeutic multivitamin-minerals (THERAGRAN-M) tablet Take 1 tablet by mouth daily. Allergies: Allergies   Allergen Reactions    Pneumococcal Vaccines         Social History:   reports that she has never smoked. She has never used smokeless tobacco. She reports that she does not drink alcohol and does not use drugs. Family History:  family history includes Heart Disease in her sister; Stroke in her mother.      Physical Exam:  BP (!) 155/84   Pulse 81   Temp 98.9 °F (37.2 °C) (Oral)   Resp 20   Ht 5' 3\" (1.6 m)   Wt 89 lb 11.2 oz (40.7 kg)   SpO2 95%   BMI 15.89 kg/m²     General appearance: Pleasant, frail elderly female  Eyes: Sclera clear without conjunctival injection; PERRLA; EOMI  ENT: Mucous membranes moist without thrush; normal dentition  Neck: Supple without meningismus; no goiter; no carotid bruit bilaterally  Cardiovascular: Regular rhythm without ectopy; normal S1-S2 with no murmurs; no peripheral edema; no JVD  Respiratory: No tachypnea; CTAB with adequate air exchange, no wheeze, rhonchi or rales; I:E intact  Gastrointestinal: Right inguinal fullness with tenderness to palpation; bowel sounds diminished; no masses/organomegaly appreciated  Musculoskeletal: Bitemporal wasting/posterior ribs visible; FROM spine and extremities x4  Neurology: A&O x3; cranial nerves 2-12 grossly intact; motor 5/5  BUE/BLE; finger-to-nose/heel-to-shin intact; no pronator drift; no seizure activity  Psychiatry: Well-groomed with good eye contact; anxious affect; no visual/auditory hallucination  Skin: Warm, dry, normal turgor, no rash  PV: 2/4 radial and dorsalis pedis bilaterally; brisk capillary refill    Labs:  CBC:   Lab Results   Component Value Date    WBC 6.0 06/07/2021    RBC 3.91 06/07/2021    RBC 4.29 09/14/2016    HGB 12.3 06/07/2021    HCT 36.6 06/07/2021    MCV 93.7 06/07/2021    MCH 31.6 06/07/2021    MCHC 33.7 06/07/2021    RDW 14.3 06/07/2021     06/07/2021    MPV 6.9 06/07/2021     BMP:    Lab Results   Component Value Date     06/07/2021    K 3.9 06/07/2021    K 3.9 06/07/2021     06/07/2021    CO2 30 06/07/2021    BUN 15 06/07/2021    CREATININE 0.8 06/07/2021    CALCIUM 10.1 06/07/2021    GFRAA >60 06/07/2021    GFRAA >60 04/03/2013    LABGLOM >60 06/07/2021    GLUCOSE 95 06/07/2021    GLUCOSE 82 09/14/2016     XR ABDOMEN (KUB) (SINGLE AP VIEW)   Final Result   Enteric catheter side port just beyond the GE junction with the tip in the   proximal gastric body. XR ABDOMEN (KUB) (SINGLE AP VIEW)   Final Result   Unremarkable limited KUB. NG tube tip projects at the left upper quadrant, overlying the expected   location of the stomach. Side port of the NG tube projects at the distal   esophagus esophagogastric junction level. Consider dancing mid NG tube   several cm such that the side port is beyond esophagogastric junction. CTA ABDOMINAL AORTA W BILAT RUNOFF W CONTRAST   Final Result   No pseudoaneurysm is identified in the groins. There is a right inguinal hernia with small bowel content, possibly   incarcerated segment. There is mild dilation of the upstream small bowel,   consistent with evolving small bowel obstruction. Small amount of free fluid   is also noted. Mesenterorenal arterial disease. Celiac trunk stent placed in interval is   widely patent. Mild proximal SMA stenosis stable. Severe right and mild   left proximal renal artery stenoses stable. Aortoiliac vessels are widely patent. Femoropopliteal segments are patent, with mild, variable stenoses. Right anterior tibial and left peroneal one- vessel runoff. Small bilateral pleural effusions and bibasilar atelectasis.          XR ABDOMEN (2 VIEWS)    (Results Pending)         EKG:      I visualized CXR images and EKG strips personally and agree with documented interpretation    Discussed case  with ED provider    Problem List  Principal Problem:    SBO (small bowel obstruction) (HCC)  Active Problems:    Unilateral incarcerated inguinal hernia    Malignant hypertensive urgency    Aneurysm of thoracic aorta (HCC)    PAF (paroxysmal atrial fibrillation) (HCC)    PAD (peripheral artery disease) (HCC)    Celiac artery stenosis (HCC)  Resolved Problems:    * No resolved hospital problems.  *        Assessment/Plan:     Malignant hypertensive urgency with troponin leak  -Patient admitted to telemetry floor for continuous monitoring during stay; serial troponins to be trended  -EKG obtained in ED reviewed personally and found to be without evidence of LAD or acute ischemia  -Home medications placed on hold due to strict n.p.o. status  -IV hydralazine scheduled PRN (with parameters) to address extreme BP elevation  -ECHO scheduled to further assess cardiac structure and function    Inguinal hernia with SBO  -Consultation placed to surgery with plans for conservative management overnight  -NG tube in place, and patient remains strict n.p.o.  -Serial lactate levels to be trended  -IV morphine available sparingly for pain control as needed  -Due to celiac stent as well as PAF, anticoagulation unable to be discontinued abruptly; patient initiated on heparin GTT bridge    Unintentional weight loss with cachexia  -Etiology unclear at this time; concern for underlying occult process/malignancy  -TSH/ free T4, vitamin D, B12/folate, prealbumin and A1c lab work ordered with results currently pending  -ECHO scheduled to further assess cardiac function  -PT/OT consultation placed for evaluation and treatment  -Consultation placed to GI for further evaluation of malabsorption/anorexia    Chronic anticoagulation  -Patient with history of PAF as well as celiac artery stenosis typically maintained on Eliquis 2.5 mg twice daily  -Due to n.p.o. status, patient was initiated on low-dose weight-based heparin drip (without initial bolus) for bridging therapy pending possible surgical intervention    DVT prophylaxis-low-dose weight-based heparin drip initiated  Code status-full code  Diet-strict n.p.o. with NG tube in place  IV access-PIV established in ED      Admit as inpatient. I anticipate hospitalization spanning more than two midnights for investigation and treatment of the above medically necessary diagnoses. Please note that some part of this chart was generated using Dragon dictation software. Although every effort was made to ensure the accuracy of this automated transcription, some errors in transcription may have occurred inadvertently. If you may need any clarification, please do not hesitate to contact me through [facility].        Jose Venegas MD    6/7/2021 6:02 AM

## 2021-06-06 NOTE — ED NOTES
Dr. Keyonna Giles at bedside. Pt NG tube advanced from 55cm to 60 cm per radiologist xray read.  Pt resting in bed, denies needs at this time     Pedro Driver RN  06/06/21 1349

## 2021-06-06 NOTE — ED NOTES
Ps Gen Surg @ 3124  Re: Possible incarcerated right inguinal hernia with bowel segment emerging proximal small bowel obstruction  Dr Marilia Petit responded @ [address] [facility]  06/06/21 [id number]

## 2021-06-06 NOTE — ED PROVIDER NOTES
I independently performed a history and physical on Diana Braun. All diagnostic, treatment, and disposition decisions were made by myself in conjunction with the advanced practice provider. For further details of [facility] emergency department encounter, please see Maritza Clements PA-C's documentation. Patient is an 18-year-old female presenting today due to concern for right lower abdominal pain and groin discomfort worsening throughout the day today associated with nausea. She denies any chest pain or shortness of breath. She recently had a celiac artery stent placement related to atherosclerosis on May 10. She is on Eliquis. No reported fever. No urinary complaints. Due to worsening abdominal discomfort that is worse in the right lower abdominal region, she came to the ED. Physical:   Gen: Mild acute distress. AOx3. Psych: Anxious mood and affect  HEENT: NCAT, dry MM  Neck: supple  Cardiac: RRR, pulses 2+ in upper extremities  Lungs: C2AB, no R/R/W  Abdomen: soft and moderate RLQ tenderness with no R/G, mild distention noted and decreased bowel sounds   Neuro: no focal neuro deficits with strength 5/5 in all 4 extremities    The Ekg interpreted by me shows  Sinus rhythm with PACs noted with a rate of 74  Axis is   Left axis deviation  QTc is  within an acceptable range  Intervals and Durations are unremarkable. ST Segments: no acute change and nonspecific changes  No significant change from prior EKG dated - 7/7/20  No STEMI, LBBB present today similar to old EKG, no STEMI based on Sgarbossa criteria     Critical Care Time:  Time: 20 minutes  Includes  examination,lab interpretation, charting, treating for SBO   Excludes separate billable procedures. Patient at risk for serious decompensation if not treated for this life-threatening condition. MDM: Patient was evaluated due to concern for worsening abdominal pain throughout the day today with associated nausea. By the time I saw her, CT was resulted showing concern for incarcerated right inguinal hernia along with concern for small bowel obstruction. Therefore, I did order a nasogastric tube for placement and she will need to have general surgery see her for possible surgery related to this obstruction. Initial troponin was mildly elevated although she denied any chest pain or shortness of breath and this will need to be trended. She is stable for the floor with telemetry.      Nadia Sandy MD  06/07/21 [id number]

## 2021-06-06 NOTE — ED PROVIDER NOTES
[address] [facility] C3 TELE/MED SURG/ONC  EMERGENCY DEPARTMENT ENCOUNTER        Pt Name: Yobani Qureshi  MRN: [id number]  [place]illa 1940  Date of evaluation: 6/6/2021  Provider: Sandy Rodas PA-C  PCP: NED Dodd CNP  Note Started: 4:02 PM EDT        I have seen and evaluated this patient with my supervising physician Adrian Hebert MD.    [facility]       Chief Complaint   Patient presents with    Abdominal Pain     stent placed in right groin in may, not sure if that is causing her to have abd pain now. HISTORY OF PRESENT ILLNESS   (Location, Timing/Onset, Context/Setting, Quality, Duration, Modifying Factors, Severity, Associated Signs and Symptoms)  Note limiting factors. Yobani Qureshi is a 78 y.o. female who presents with a Chief Complaint of lower abdominal pain mostly on the right. States a bulge in the right inguinal area. This is site of her previous venipuncture as she had a procedure by Dr. Emilie Quiroz on May 10, 2021 at which time he placed stent. She is uncertain as to where the stent is placed. She does indicate abdominal pain with nausea and without vomiting. She does not indicate any diarrhea or constipation. She does report having mild constipation of the past 2 days. BM this morning. Gas productivity noted by patient. Pain increasing over the past several days in the right lower quadrant area. No chest pain or shortness of breath. Denies any fevers or chills. Patient has widespread vascular disease and she continues to smoke. The patient does have severe right renal artery stenosis and mild left renal artery stenosis. She has a stent in the celiac trunk. This appears to be widely patent based on CTA today. She does have some mild narrowing of the SMA. Nursing Notes were all reviewed and agreed with or any disagreements were addressed in the HPI.     REVIEW OF SYSTEMS    (2-9 systems for level 4, 10 or more for level 5)     Review of rhythm. Heart sounds: Normal heart sounds. Pulmonary:      Effort: Pulmonary effort is normal.      Breath sounds: Normal breath sounds. Abdominal:      General: Abdomen is flat. Bowel sounds are normal.      Palpations: Abdomen is soft. Tenderness: There is abdominal tenderness. Comments: Nondistended and tender in the lower abdomen more so on the right lower quadrant area. She does have what appears to be a bulge in the right inguinal area. Not pulsating. This likely represents the right inguinal hernia is noted on CT scan. Musculoskeletal:         General: Normal range of motion. Cervical back: Normal range of motion and neck supple. Skin:     General: Skin is warm and dry. Neurological:      General: No focal deficit present. Mental Status: She is alert and oriented to person, place, and time. Mental status is at baseline. Psychiatric:         Mood and Affect: Mood normal.         Behavior: Behavior normal.         Thought Content:  Thought content normal.         Judgment: Judgment normal.         DIAGNOSTIC RESULTS   LABS:    Labs Reviewed   COMPREHENSIVE METABOLIC PANEL - Abnormal; Notable for the following components:       Result Value    Glucose 137 (*)     BUN 24 (*)     Albumin/Globulin Ratio 0.9 (*)     All other components within normal limits    Narrative:     Performed at:  [place] [address] [address]   Phone [phone]   URINALYSIS - Abnormal; Notable for the following components:    Protein, UA TRACE (*)     All other components within normal limits    Narrative:     Performed at:  [place] [address] [address]   Phone [phone]   MICROSCOPIC URINALYSIS - Abnormal; Notable for the following components:    Mucus, UA Rare (*)     All other components within normal limits    Narrative:     Performed at:  [facility]  [address] [place] [address], [facility] [place]   Phone [phone]   TROPONIN - Abnormal; Notable for the following components:    Troponin 0.02 (*)     All other components within normal limits    Narrative:     Performed at:  [address], [facility] [place]   Phone [phone]   LIPASE - Abnormal; Notable for the following components:    Lipase 91.0 (*)     All other components within normal limits    Narrative:     Performed at:  [address], [facility] [place]   Phone [phone]   TROPONIN - Abnormal; Notable for the following components:    Troponin 0.02 (*)     All other components within normal limits    Narrative:     Performed at:  [place] [address] [place]   Phone [phone] - Abnormal; Notable for the following components:    Protime 14.6 (*)     INR 1.26 (*)     All other components within normal limits    Narrative:     Performed at:  [address], [facility] [place]   Phone [phone]   APTT - Abnormal; Notable for the following components:    aPTT 45.2 (*)     All other components within normal limits    Narrative:     Performed at:  [address], [facility] [place]   Phone [phone] - Abnormal; Notable for the following components:    Pro-BNP 3,848 (*)     All other components within normal limits    Narrative:     Performed at:  [address], [facility] [place]   Phone [phone]   CBC WITH AUTO DIFFERENTIAL - Abnormal; Notable for the following components:    RBC 3.91 (*)     All other components within normal limits    Narrative:     Performed at:  [facility]  [address] [place] [address], [address], [address]   Phone [phone]   COMPREHENSIVE METABOLIC PANEL W/ REFLEX TO MG FOR LOW K - Abnormal; Notable for the following components:    Albumin 3.3 (*)     Albumin/Globulin Ratio 0.9 (*)     All other components within normal limits    Narrative:     Performed at:  [address],  [address], [address]   Phone [phone]   APTT - Abnormal; Notable for the following components:    aPTT 54.3 (*)     All other components within normal limits    Narrative:     Performed at:  [address],  [address], [address]   Phone [phone]   VITAMIN B12 & FOLATE - Abnormal; Notable for the following components:    Vitamin B-12 959 (*)     All other components within normal limits    Narrative:     Performed at:  [address] [address]   Phone [phone]   CULTURE, BLOOD 1   CULTURE, BLOOD 2   CBC WITH AUTO DIFFERENTIAL    Narrative:     Performed at:  [address],  [address], [address]   Phone [phone]   LACTIC ACID, PLASMA    Narrative:     Performed at:  [address],  [address], [address]   Phone [phone]   MAGNESIUM    Narrative:     Performed at:  [facility]) [address],  [address], [address]   Phone [phone]   APTT    Narrative:     Performed at:  [address],  [address], [address]   Phone [phone]   D-DIMER, QUANTITATIVE    Narrative:     Performed at:  [address],  [address], [address]   Phone [phone]   CBC    Narrative:     Performed at:  [address],  [address], [address]   Phone [phone]   APTT    Narrative: Performed at:  [address], [facility] [employer]   Phone [phone]   BASIC METABOLIC PANEL    Narrative:     Performed at:  [address], [facility] [employer]   Phone [phone]   LACTIC ACID, PLASMA    Narrative:     Performed at:  [address], [facility] [employer]   Phone [phone]   LACTIC ACID, PLASMA    Narrative:     Performed at:  [address], [facility] [employer]   Phone [phone]   MAGNESIUM    Narrative:     Performed at:  [address], [facility] [employer]   Phone [phone]   TROPONIN    Narrative:     Performed at:  [address], [facility] [employer]   Phone [phone]   TROPONIN    Narrative:     Performed at:  [address], [facility] [employer]   Phone [phone]   TROPONIN    Narrative:     Performed at:  [address], [facility] [employer]   Phone [phone]   TROPONIN    Narrative:     Performed at:  [address], [facility] [employer]   Phone [phone]   TROPONIN    Narrative:     Performed at:  [address], [facility] [employer]   Phone [phone]   TROPONIN    Narrative:     Performed at:  [address], [facility] [employer]   Phone [phone]   HEMOGLOBIN A1C    Narrative:     Performed at:  [facility]  [address] [place], [place] [address]   Phone [phone]   PREALBUMIN    Narrative:     Performed at:  [facility]  [address] [place] [address]., [place] [address]   Phone [phone]   VITAMIN D 25 HYDROXY    Narrative:     Performed at:  [facility]  [address] [facility] [facility] [place] [address]   Phone [phone]   T4, FREE    Narrative:     Performed at:  [facility]  [address] [facility] [place] [address]   Phone [phone]   TSH WITHOUT REFLEX    Narrative:     Performed at:  [facility]  [address] [facility] [place] [facility] [place] [address]   Phone [phone]   BLOOD OCCULT STOOL SCREEN #1   BLOOD OCCULT STOOL SCREEN #1   CBC   BLOOD OCCULT STOOL SCREEN #1   TYPE AND SCREEN    Narrative:     Performed at:  [address]   Phone [phone]       All other labs were within normal range or not returned as of this dictation. EKG: All EKG's are interpreted by the Emergency Department Physician in the absence of a cardiologist.  Please see their note for interpretation of EKG. RADIOLOGY:   Non-plain film images such as CT, Ultrasound and MRI are read by the radiologist. Plain radiographic images are visualized and preliminarily interpreted by the ED Provider with the below findings:        Interpretation per the Radiologist below, if available at the time of this note:    XR ACUTE ABD SERIES CHEST 1 VW   Final Result   Malposition of nasogastric tube as above, the tube should be advanced. The findings were sent to the Radiology Results [address] at 8:43   am on 6/7/2021to be communicated to a licensed caregiver. XR ABDOMEN (KUB) (SINGLE AP VIEW)   Final Result   Enteric catheter side port just beyond the GE junction with the tip in the   proximal gastric body. XR ABDOMEN (KUB) (SINGLE AP VIEW)   Final Result   Unremarkable limited KUB. NG tube tip projects at the left upper quadrant, overlying the expected   location of the stomach.  Side port of the NG tube projects at the distal   esophagus esophagogastric junction level. Consider dancing mid NG tube   several cm such that the side port is beyond esophagogastric junction. CTA ABDOMINAL AORTA W BILAT RUNOFF W CONTRAST   Final Result   No pseudoaneurysm is identified in the groins. There is a right inguinal hernia with small bowel content, possibly   incarcerated segment. There is mild dilation of the upstream small bowel,   consistent with evolving small bowel obstruction. Small amount of free fluid   is also noted. Mesenterorenal arterial disease. Celiac trunk stent placed in interval is   widely patent. Mild proximal SMA stenosis stable. Severe right and mild   left proximal renal artery stenoses stable. Aortoiliac vessels are widely patent. Femoropopliteal segments are patent, with mild, variable stenoses. Right anterior tibial and left peroneal one- vessel runoff. Small bilateral pleural effusions and bibasilar atelectasis. CTA ABDOMINAL AORTA W BILAT RUNOFF W CONTRAST    Result Date: 6/6/2021  EXAMINATION: CTA OF THE AORTA WITH LOWER EXTREMITY RUNOFF 6/6/2021 1:50 pm TECHNIQUE: CTA of the pelvis and bilateral lower extremities was performed after the administration of intravenous contrast.   Multiplanar reformatted images are provided for review. MIP images are provided for review. Dose modulation, iterative reconstruction, and/or weight based adjustment of the mA/kV was utilized to reduce the radiation dose to as low as reasonably achievable. COMPARISON: 04/07/2021 HISTORY: ORDERING SYSTEM PROVIDED HISTORY: Recent stent placement right iliac femoral with firm enlarged with concern for pseudoaneurysm. TECHNOLOGIST PROVIDED HISTORY: Reason for exam:->Recent stent placement right iliac femoral with firm enlarged with concern for pseudoaneurysm.  Decision Support Exception - unselect if not a suspected or confirmed emergency medical condition->Emergency Medical Condition (MA) Reason for Exam: recent right iliac stent,having abd pain now Acuity: Acute Type of Exam: Initial FINDINGS: Nonvascular Lower Chest: Emphysematous and interstitial fibrotic changes are in the lung bases. There are trace bilateral pleural effusions, with peripheral atelectasis or pleuroparenchymal scarring. Organs: Liver is homogeneous and normal in attenuation. Gallbladder is absent. The pancreas, spleen and adrenal glands are unremarkable. The nephrograms are symmetrical.  There is no hydronephrosis. GI/Bowel: The stomach is unremarkable. There are mildly dilated loops of small bowel in the lower abdomen. There is a right inguinal hernia with distal ileum content and transition in caliber. There is mild prominence of stool in the colon. Pelvis: Urinary bladder and uterus are unremarkable. A small amount of free fluid is present. Peritoneum/Retroperitoneum: No adenopathy. Bones/Soft Tissues: No acute osseous abnormality. VASCULAR There is moderate cardiomegaly. The abdominal aorta is tortuous and widely patent, with mild calcification and no overt aneurysm. A metallic stent has been placed in the proximal celiac trunk. Celiac is widely patent. There is mild narrowing of the superior mesenteric artery at its origin, densely calcified. Inferior mesenteric artery is patent. In the main right renal artery, there is a near ostial severe stenosis, 5 mm in length, not significantly changed. In the left renal artery, there is mild proximal stenosis with dense calcification. The bilateral common and external iliac arteries are widely patent. Internal iliac arteries are patent, mild stenosis at origin of the left. Common femoral arteries are widely patent bilaterally. There is no pseudoaneurysm. There is in-line flow through the femoropopliteal segments. Distal SFAs are mildly irregular bilaterally, with mild stenoses.   Popliteal vessels are mildly irregular with mild stenoses. Of the right, there is 1 vessel runoff via the anterior tibial.  The peroneal and posterior tibial are proximally occluded. On the left, there is 1 vessel runoff via the peroneal.  The proximal posterior and anterior tibial arteries are occluded. There is intermittent reconstitution of the anterior tibial.     No pseudoaneurysm is identified in the groins. There is a right inguinal hernia with small bowel content, possibly incarcerated segment. There is mild dilation of the upstream small bowel, consistent with evolving small bowel obstruction. Small amount of free fluid is also noted. Mesenterorenal arterial disease. Celiac trunk stent placed in interval is widely patent. Mild proximal SMA stenosis stable. Severe right and mild left proximal renal artery stenoses stable. Aortoiliac vessels are widely patent. Femoropopliteal segments are patent, with mild, variable stenoses. Right anterior tibial and left peroneal one- vessel runoff. Small bilateral pleural effusions and bibasilar atelectasis.            PROCEDURES   Unless otherwise noted below, none     Procedures    CRITICAL CARE TIME   N/A    CONSULTS:  IP CONSULT TO GENERAL SURGERY  IP CONSULT TO HOSPITALIST  IP CONSULT TO GI  IP CONSULT TO GI      EMERGENCY DEPARTMENT COURSE and DIFFERENTIAL DIAGNOSIS/MDM:   Vitals:    Vitals:    06/07/21 0930 06/07/21 1028 06/07/21 1135 06/07/21 1920   BP: (!) 160/74 (!) 166/92 (!) 162/82 (!) 160/84   Pulse:  77 69 85   Resp:    16   Temp:   97.4 °F (36.3 °C) 99.7 °F (37.6 °C)   TempSrc:   Oral Oral   SpO2:   94% 93%   Weight:       Height:           Patient was given the following medications:  Medications   0.9 % sodium chloride infusion ( Intravenous New Bag 6/7/21 0617)   sodium chloride flush 0.9 % injection 10 mL (10 mLs Intravenous Given 6/7/21 0948)   sodium chloride flush 0.9 % injection 10 mL (has no administration in time range)   0.9 % sodium chloride infusion (has no administration in time range) potassium chloride (KLOR-CON M) extended release tablet 40 mEq (has no administration in time range)     Or   potassium bicarb-citric acid (EFFER-K) effervescent tablet 40 mEq (has no administration in time range)     Or   potassium chloride 10 mEq/100 mL IVPB (Peripheral Line) (has no administration in time range)   magnesium sulfate 2000 mg in 50 mL IVPB premix (has no administration in time range)   promethazine (PHENERGAN) tablet 12.5 mg ( Oral See Alternative 6/7/21 0113)     Or   ondansetron (ZOFRAN) injection 4 mg (4 mg Intravenous Given 6/7/21 0113)   acetaminophen (TYLENOL) tablet 650 mg (has no administration in time range)     Or   acetaminophen (TYLENOL) suppository 650 mg (has no administration in time range)   bisacodyl (DULCOLAX) suppository 10 mg (has no administration in time range)   pantoprazole (PROTONIX) injection 40 mg (40 mg Intravenous Given 6/7/21 0947)   heparin (porcine) injection 2,400 Units (has no administration in time range)   heparin (porcine) injection 1,200 Units (1,200 Units Intravenous Given 6/7/21 0436)   heparin 25,000 units in dextrose 5% 250 mL (premix) infusion (5.7 mL/hr Intravenous Rate/Dose Change 6/7/21 0436)   morphine (PF) injection 2 mg (has no administration in time range)     Or   morphine (PF) injection 4 mg (has no administration in time range)   phenol 1.4 % mouth spray 1 spray (has no administration in time range)   perflutren lipid microspheres (DEFINITY) injection 1.65 mg (has no administration in time range)   LORazepam (ATIVAN) injection 0.25 mg (0.25 mg Intravenous Given 6/7/21 1511)   benzocaine (HURRICAINE) 20 % oral spray (has no administration in time range)   0.9 % sodium chloride bolus (0 mLs Intravenous Stopped 6/6/21 1522)   iopamidol (ISOVUE-370) 76 % injection 110 mL (110 mLs Intravenous Given 6/6/21 1404)   metoprolol (LOPRESSOR) injection 10 mg (10 mg Intravenous Given 6/6/21 1811)           This patient resenting with progressive abdominal pain over the past several days. She is concerned it might be a complication related to her vascular procedure by Dr. Ailyn Mi on May 10, 2021. He apparently placed stent. I did obtain CT a abdominal aorta with runoffs showing severe right renal artery stenosis and mild left renal artery stenosis. Stent in the celiac trunk noted. Narrowing SMA noted. The CT scan further indicates what appears to be an incarcerated or possibly incarcerated right inguinal hernia with a small bowel obstruction proximal to this area. Fluid level noted. Laboratory studies obtained showing a WBC of 6.6. The patient's BUN 24, creatinine 0.8 and GFR greater than 60. Lipase 91. On July 7, 2020 lipase of 57. The patient had initial troponin of 0.02. This was repeated and remains at 0.02. Case reviewed with attending physician evaluates patient. He does indicate right inguinal hernia and the CT scan showing possible incarceration with an emerging small bowel obstruction. Recommendation for admission and contact general surgery. At 4:15 PM I spoke with Dr. Joya German on-call for general surgery. Case discussed. Recommending admission by hospitalist and they will consult. FINAL IMPRESSION      1. Incarcerated right inguinal hernia    2. Right lower quadrant abdominal pain    3. Uncontrolled hypertension          DISPOSITION/PLAN   DISPOSITION Admitted 06/06/2021 06:34:56 PM      PATIENT REFERRED TO:  No follow-up provider specified. DISCHARGE MEDICATIONS:  Current Discharge Medication List          DISCONTINUED MEDICATIONS:  Current Discharge Medication List                 (Please note that portions of this note were completed with a voice recognition program.  Efforts were made to edit the dictations but occasionally words are mis-transcribed. )    Juno Batista PA-C (electronically signed)           Juno Batista PA-C  06/07/21 1924

## 2021-06-07 NOTE — PROGRESS NOTES
Notified [facility] pt returned from [facility]  Electronically signed by Danitza Choudhary RN on 6/7/2021 at 8:59 AM

## 2021-06-07 NOTE — PLAN OF CARE
Nutrition Problem #1: Severe malnutrition  Intervention: Food and/or Nutrient Delivery: Continue NPO (Advance per MD)  Nutritional Goals: Receive the most appropriate form of nutrition during this admission.

## 2021-06-07 NOTE — PROGRESS NOTES
[facility] SURGERY    PATIENT NAME: Manuel Catalan     TODAY'S DATE: 6/7/2021    CHIEF COMPLAINT: none    INTERVAL HISTORY/HPI:    Pt without abdominal pain, having some flatus, no nausea, no fevers. REVIEW OF SYSTEMS:  Pertinent positives and negatives as per interval history section    OBJECTIVE:  VITALS:  BP (!) 162/82   Pulse 69   Temp 97.4 °F (36.3 °C) (Oral)   Resp 20   Ht 5' 3\" (1.6 m)   Wt 89 lb 11.2 oz (40.7 kg)   SpO2 94%   BMI 15.89 kg/m²     INTAKE/OUTPUT:    I/O last 3 completed shifts: In: 2058 [I.V.:773; NG/GT:60; IV Piggyback:500]  Out: 300 [Emesis/NG output:300]  No intake/output data recorded. CONSTITUTIONAL:  awake and alert  LUNGS:  Respirations easy and unlabored  CARD:  regular rate  ABDOMEN:  normal bowel sounds, soft, non-distended, non-tender, right groin no palpable hernia and soft    Data:  CBC:   Recent Labs     06/06/21 1218 06/06/21 2110 06/07/21  0314   WBC 6.6 6.5 6.0   HGB 13.8 13.5 12.3   HCT 40.9 40.1 36.6    232 210     BMP:    Recent Labs     06/06/21 1218 06/07/21  0314    137   K 3.8 3.9  3.9   CL 99 100   CO2 28 30   BUN 24* 15   CREATININE 0.8 0.8   GLUCOSE 137* 95     Hepatic:   Recent Labs     06/06/21  1218 06/07/21  0314   AST 26 21   ALT 12 10   BILITOT 0.3 0.4   ALKPHOS 101 86     Mag:      Recent Labs     06/06/21  1218 06/07/21  0314   MG 2.10 1.90      Phos:   No results for input(s): PHOS in the last 72 hours. INR:   Recent Labs     06/06/21  1632   INR 1.26*       Radiology Review:  *Imaging personally reviewed by me. AXR - no air fluid levels      ASSESSMENT AND PLAN:  79 yo with reduced right inguinal hernia  1. Hernia remains reduced and no signs of continued obstruction on exam or imaging  2. Remove ng and start clear liquids  3. Continue to hold plavix and eliquis for operation possibly this admission. Currently on heparin gtt which will hold prior to operation.      Electronically signed by Erlin Oleary Ghazala [facility], [address]

## 2021-06-07 NOTE — PROGRESS NOTES
Pt transported to 321 from ED. Alert and oriented, oriented to room/call light, POC. BP elevated, all other VSS. Assessment completed as charted. NG to CLWS. Pt denies any pain or nausea at present time. Resting in bed, call light and bedside table within reach. Bed locked and in lowest position. Bed alarm on and audible.

## 2021-06-07 NOTE — PROGRESS NOTES
Notified [facility] pt going to xray. VSS.   Electronically signed by Pastor Felty, RN on 6/7/2021 at 8:06 AM

## 2021-06-07 NOTE — CARE COORDINATION
CASE MANAGEMENT INITIAL ASSESSMENT    Reviewed chart and completed assessment with: Patient     Explained Case Management role/services. Primary contact information: Tullos Boer     [facility] Care Decision Maker :   Primary Decision Maker: Sloan Castro - Child - [phone]    Secondary Decision Maker: Isabela Mosquera - Child - [phone]        Can this person be reached and be able to respond quickly, such as within a few minutes or hours? Yes    Admit date/status:06/06/2021 Inpatient   Diagnosis:SBO   Is this a Readmission?:  No      Insurance:Medicare    Precert required for SNF: No       3 night stay required: No    Living arrangements, Adls, care needs, prior to admission: Patient reports living at home alone, able to live on 1st floor if needed, uses RW for ambulation     Transportation:Son supports need     Durable Medical Equipment at home:  Walker_x_Cane_x_RTS__ BSC__Shower Chair__  02__ HHN__ CPAP__  BiPap__  Hospital Bed__ W/C_x__ Other__________    Services in the home and/or outpatient, prior to admission: Patient reports active with [facility]    PT/OT recs: Home with [facility]     Hospital Exemption Notification (HEN):NA    Barriers to discharge: Denies Ng in place at this time    Plan/comments: Patient plans to dc home alone, using walker for ambulation active with [facility]. SW will follow for needs. NEYMAR Connolly on chart for MD signature

## 2021-06-07 NOTE — CONSULTS
Pharmacy to Manage Heparin Infusion per Hospital Policy    Dx: PAF  Pt wt = 40.7 kg. Baseline aPTT = 33.4 at 1632. Low Dose Heparin Infusion  Heparin infusion started at 490 units/hr (recommended initial max dose 1000 units./hr). Recheck aPTT in 6 hours. Goal aPTT = 49-76 seconds. aPTT < 36.3    Heparin 60 units/kg bolus  Increase infusion by 4 units/kg/hr  aPTT 36.3 - 48.9 Heparin 30 units/kg bolus Increase infusion by 2 units/kg/hr  aPTT 49 - 76    No bolus No change   aPTT 76.1 - 85 No bolus Decrease infusion by 2 units/kg/hr  aPTT 85.1 - 94 Hold heparin for 1 hour Decrease infusion by 3 units/kg/hr  aPTT 94.1 - 103    Hold heparin for 1 hour Decrease infusion by 4 units/kg/hr  aPTT > 103 Hold heparin for 1 hour Decrease infusion by 6 units/kg/hr    Obtain aPTT 6 hours after bolus and 6 hours after any dose change until two consecutive therapeutic aPTT are achieved- then daily. Mc Domingo, PharmD 6/6/2021  8:58 PM    6/7/2021  Recent Labs     06/07/21  0314   APTT 45.2*     Will give bolus of 1200 units and increase heparin gtt to 5.7 mL/hr. Recheck aPTT in 6 hours. Billie Walker, PharmD  6/7/2021 4:23 AM    6/7 1057  APTT = 54/3 seconds  Continue heparin at current rate of 5.7 ml/jr  Recheck  aPTT in 6 hours       6/7/2021  Recent Labs     06/07/21  1950   APTT 53.8*     Continue heparin at current rate of 5.7 ml/hr  Daily aPTT ordered.     Lynette Vogt [facility] 6/7/2021 8:36 PM

## 2021-06-07 NOTE — PROGRESS NOTES
NG advanced to jessica 58. Pt tolerated well. Flushed NG with 60mL water. NG returned to WS. Denies N/V. BS active x4; passing flatus. Denies pain.     Electronically signed by Wade Flores RN on 6/7/2021 at 11:05 AM

## 2021-06-07 NOTE — PROGRESS NOTES
Comprehensive Nutrition Assessment    Type and Reason for Visit:  Initial, Positive Nutrition Screen    Nutrition Recommendations/Plan:   1. Continue NPO, advance diet per MD  2. Will monitor for appropriate ONS when able to consume PO  3. If unable to tolerate PO diet, consider alternative method of nutrition if in line with POC d/t malnutrition. Consult RD for recommendations. 4. Monitor nutrition adequacy, pertinent labs, bowel habits, wt changes, and clinical progress    Nutrition Assessment:  Positive nutrition screen for weight loss and poor intake: Admitted with abdominal pain, found to have early small bowel obstruction. ECHO ordered. Pt currently NPO, NGT placed. Pt reports poor intake and appetite PTA. Pt reports drinking boost at home, willing to trial ONS when medically feasible. Reports UBW of 121 lb 6 months ago (-26% weight loss). Reports some difficulty chewing, would like soft food when diet advances. Provided pt with nutrition education on quick and easy recipes for when she returns home. If patient is unable to tolerate diet advancement consider alternative methods of nutrition if in line with POC. Will continue to monitor. Malnutrition Assessment:  Malnutrition Status:  Severe malnutrition    Context:  Acute Illness     Findings of the 6 clinical characteristics of malnutrition:  Energy Intake:  7 - 50% or less of estimated energy requirements for 5 or more days  Weight Loss:  7 - Greater than 7.5% over 3 months     Body Fat Loss:  1 - Mild body fat loss     Muscle Mass Loss:  1 - Mild muscle mass loss      Estimated Daily Nutrient Needs:  Energy (kcal):  5891-9922 kcals/day; Weight Used for Energy Requirements:  Ideal (52 kg)     Protein (g):  62-73 g/day; Weight Used for Protein Requirements:  Ideal (1.2-1.4 g/kg)        Fluid (ml/day):  1600 ml; Method Used for Fluid Requirements:  1 ml/kcal      Nutrition Related Findings:  NGT placed to suction, 200 ml output.  Lower partials, difficulty chewing. Wounds:  None       Current Nutrition Therapies:    Diet NPO    Anthropometric Measures:  · Height: 5' 3\" (160 cm)  · Current Body Weight: 89 lb (40.4 kg)   · Ideal Body Weight: 115 lbs; % Ideal Body Weight 77.4 %   · BMI: 15.8  · BMI Categories: Underweight (BMI less than 22) age over 72       Nutrition Diagnosis:   · Severe malnutrition related to altered GI function as evidenced by intake 0-25%, BMI, poor intake prior to admission    Nutrition Interventions:   Food and/or Nutrient Delivery:  Continue NPO (Advance per MD)  Nutrition Education/Counseling:  No recommendation at this time   Coordination of Nutrition Care:  Continue to monitor while inpatient    Goals:  Receive the most appropriate form of nutrition during this admission. Nutrition Monitoring and Evaluation:   Behavioral-Environmental Outcomes:  None Identified   Food/Nutrient Intake Outcomes:  Food and Nutrient Intake, Diet Advancement/Tolerance  Physical Signs/Symptoms Outcomes:  Skin, Weight, Nutrition Focused Physical Findings     Discharge Planning:     Too soon to determine     Electronically signed by Masha Solis MS, RD, LD on 6/7/21 at 1:03 PM EDT    Contact: Office: [phone]; [address] [place] [address] Road: [phone]

## 2021-06-07 NOTE — PROGRESS NOTES
NG removed per orders. Pt tolerated fabulously.   Electronically signed by Alix Cohen RN on 6/7/2021 at 3:11 PM

## 2021-06-07 NOTE — PROGRESS NOTES
Hospitalist Progress Note      PCP: END Ash - CNP    Date of Admission: 6/6/2021    Chief Complaint:   Abdominal Pain        stent placed in right groin in may, not sure if that is causing her to have abd pain now. Hospital Course: Vinh Brand is a 78 y.o. female who presented to the ED to be evaluated for acutely worsening RLQ abdominal pain ongoing for greater than 1 weeks duration. Patient endorses right inguinal \"bulge\" where she reports vascular surgeon Dr. Ciro Cadena gained access for mesenteric stent placement on 5/10/2021. Upon arrival to the ED, CTA of the abdominal aorta was performed and notable for right inguinal hernia with possible incarcerated small bowel's content; consistent with evolving SBO. Further mention was made of mesenterorenal arterial disease with patent proximal celiac stent. Labs were obtained revealing mild lipase elevation at 91 as well as small troponin elevation 0.02 possibly due to malignant hypertensive urgency presentation. EKG revealed  left axis deviation, LBBB, and rhythm aberrancy. Hospitalist team consulted to admit this patient for early small bowel obstruction with concomitant malignant hypertensive urgency/troponin leak. Subjective: EMR notes reviewed patient seen and examined. Complains of irritation to throat and is overall anxious no chest pain or shortness of breath belly pain denies, no nausea or vomiting at present.        Medications:  Reviewed    Infusion Medications    sodium chloride 100 mL/hr at 06/07/21 0617    sodium chloride      heparin (PORCINE) Infusion 5.7 mL/hr (06/07/21 0436)     Scheduled Medications    sodium chloride flush  10 mL Intravenous 2 times per day    pantoprazole  40 mg Intravenous Daily     PRN Meds: perflutren lipid microspheres, sodium chloride flush, sodium chloride, potassium chloride **OR** potassium alternative oral replacement **OR** potassium chloride, magnesium sulfate, promethazine **OR** ondansetron, acetaminophen **OR** acetaminophen, bisacodyl, hydrALAZINE, heparin (porcine), heparin (porcine), morphine **OR** morphine, phenol      Intake/Output Summary (Last 24 hours) at 6/7/2021 0817  Last data filed at 6/7/2021 0445  Gross per 24 hour   Intake 1263 ml   Output 100 ml   Net 1163 ml       Physical Exam Performed:    BP (!) 165/90   Pulse 77   Temp 97.8 °F (36.6 °C) (Oral)   Resp 20   Ht 5' 3\" (1.6 m)   Wt 89 lb 11.2 oz (40.7 kg)   SpO2 95%   BMI 15.89 kg/m²     General appearance: No apparent distress, appears stated age and cooperative. Anxious  HEENT: Pupils equal, round, and reactive to light. Conjunctivae/corneas clear. Neck: Supple, with full range of motion. No jugular venous distention. Trachea midline. NG in place  Respiratory:  Normal respiratory effort. Clear to auscultation, bilaterally without Rales/Wheezes/Rhonchi. Cardiovascular: Regular rate and rhythm with normal S1/S2 without murmurs, rubs or gallops. Abdomen: Soft, tender, non-distended, lower bowel sounds tingly  Musculoskeletal: No clubbing, cyanosis or edema bilaterally. Full range of motion without deformity. Skin: Skin color, texture, turgor normal.  No rashes or lesions. Neurologic:  Neurovascularly intact without any focal sensory/motor deficits.  Cranial nerves: II-XII intact, grossly non-focal.  Psychiatric: Alert and oriented, thought content appropriate, normal insight  Capillary Refill: Brisk,3 seconds, normal   Peripheral Pulses: +2 palpable, equal bilaterally       Labs:   Recent Labs     06/06/21 1218 06/06/21 2110 06/07/21 0314   WBC 6.6 6.5 6.0   HGB 13.8 13.5 12.3   HCT 40.9 40.1 36.6    232 210     Recent Labs     06/06/21 1218 06/07/21 0314    137   K 3.8 3.9  3.9   CL 99 100   CO2 28 30   BUN 24* 15   CREATININE 0.8 0.8   CALCIUM 10.5 10.1     Recent Labs     06/06/21 1218 06/07/21  0314   AST 26 21   ALT 12 10   BILITOT 0.3 0.4   ALKPHOS 101 86     Recent Labs 06/06/21  1632   INR 1.26*     Recent Labs     06/06/21  2212 06/07/21  0314 06/07/21  0530   TROPONINI 0.01 0.01 <0.01       Urinalysis:      Lab Results   Component Value Date    NITRU Negative 06/06/2021    WBCUA 0-2 06/06/2021    BACTERIA 4+ 07/07/2020    RBCUA 0-2 06/06/2021    BLOODU Negative 06/06/2021    SPECGRAV 1.010 06/06/2021    GLUCOSEU Negative 06/06/2021       Radiology:  XR ABDOMEN (KUB) (SINGLE AP VIEW)   Final Result   Enteric catheter side port just beyond the GE junction with the tip in the   proximal gastric body. XR ABDOMEN (KUB) (SINGLE AP VIEW)   Final Result   Unremarkable limited KUB. NG tube tip projects at the left upper quadrant, overlying the expected   location of the stomach. Side port of the NG tube projects at the distal   esophagus esophagogastric junction level. Consider dancing mid NG tube   several cm such that the side port is beyond esophagogastric junction. CTA ABDOMINAL AORTA W BILAT RUNOFF W CONTRAST   Final Result   No pseudoaneurysm is identified in the groins. There is a right inguinal hernia with small bowel content, possibly   incarcerated segment. There is mild dilation of the upstream small bowel,   consistent with evolving small bowel obstruction. Small amount of free fluid   is also noted. Mesenterorenal arterial disease. Celiac trunk stent placed in interval is   widely patent. Mild proximal SMA stenosis stable. Severe right and mild   left proximal renal artery stenoses stable. Aortoiliac vessels are widely patent. Femoropopliteal segments are patent, with mild, variable stenoses. Right anterior tibial and left peroneal one- vessel runoff. Small bilateral pleural effusions and bibasilar atelectasis.          XR ABDOMEN (2 VIEWS)    (Results Pending)           Assessment/Plan:    Active Hospital Problems    Diagnosis     Unilateral incarcerated inguinal hernia [K40.30]     SBO (small bowel obstruction) ([facility] [W74.780]     PAD (peripheral artery disease) ([facility] [I73.9]     Celiac artery stenosis (HCC) [I77.4]     Malignant hypertensive urgency [I16.0]     PAF (paroxysmal atrial fibrillation) (HCC) [I48.0]     Aneurysm of thoracic aorta (HCC) [I71.2]      Malignant hypertensive urgency with troponin leak  - POA with -170's 100's, initial trop was 0.02, trended out 0.01> 0.01  -EKG obtained in ED;without evidence of LAD or acute ischemia  -Home medications placed on hold due to strict n.p.o. status  -IV hydralazine scheduled PRN (with parameters) to address extreme BP elevation  -ECHO scheduled to further assess cardiac structure and function  - suspect some anxiety and pain component      Inguinal hernia with SBO  -Consultation placed to surgery with plans for conservative management overnight  -NG tube in place, and patient remains strict n.p.o.  -Serial lactate levels to be trended  -IV morphine available sparingly for pain control as needed  -Due to celiac stent as well as PAF, anticoagulation unable to be discontinued abruptly; patient initiated on heparin GTT bridge     Unintentional weight loss with cachexia  -Etiology unclear at this time; concern for underlying occult process/malignancy  -TSH/ free T4, vitamin D, B12/folate, prealbumin and A1c lab work ordered with results currently pending  -ECHO scheduled to further assess cardiac function  -PT/OT consultation placed for evaluation and treatment  -Consultation placed to GI for further evaluation of malabsorption/anorexia     Chronic anticoagulation  -Patient with history of PAF as well as celiac artery stenosis typically maintained on Eliquis 2.5 mg twice daily  -Due to n.p.o. status, patient was initiated on low-dose weight-based heparin drip (without initial bolus) for bridging therapy     DVT Prophylaxis: low-dose weight-based heparin drip initiated  Diet: Diet NPO  Code Status: Full Code    PT/OT Eval Status: ordered     Dispo - pending clinical course     Sindy Belcher, APRN - CNP

## 2021-06-07 NOTE — PROGRESS NOTES
Occupational Therapy   Occupational Therapy Initial Assessment  Date: 2021   Patient Name: Barbi Woodall  MRN: [id number]     : 1940    Date of Service: 2021    Discharge Recommendations:  Continue to assess pending progress (progress after \"surgery\")       Assessment   Performance deficits / Impairments: Decreased functional mobility ; Decreased safe awareness;Decreased high-level IADLs;Decreased strength;Decreased ADL status; Decreased endurance  Assessment: pt normally independent with high level IADL's & functional mobility with RWnow requiring SBA/CGA with bathroom mobility & min A with UE/LE self care & currently NPO; pt to benefit from skilled OT services  OT Education: OT Role;Plan of Care  Patient Education: disease specific:  importance of OOB to chair for short periods of time as tolerated, use of nurse call light for AD with transfers/ADL needs  Barriers to Learning: None  REQUIRES OT FOLLOW UP: Yes  Activity Tolerance  Activity Tolerance: Patient limited by fatigue  Safety Devices  Safety Devices in place: Yes  Type of devices: Bed alarm in place;Call light within reach; Left in bed           Patient Diagnosis(es): The primary encounter diagnosis was Incarcerated right inguinal hernia. Diagnoses of Right lower quadrant abdominal pain and Uncontrolled hypertension were also pertinent to this visit. has a past medical history of CAD (coronary artery disease), Cancer ([address] Carotid artery disease ([address] HTN (hypertension), Hyperlipemia, Hyperlipidemia, LBP (low back pain), Lung nodules, Osteoporosis, Seasonal allergies, Stress bladder incontinence, female, Unspecified cerebral artery occlusion with cerebral infarction, and Wears glasses. has a past surgical history that includes Varicose vein surgery; Cardiac catheterization; and Colonoscopy (2014).            Restrictions  Restrictions/Precautions  Restrictions/Precautions: Fall Risk, General Precautions  Position Activity Restriction  Other position/activity restrictions: IV, NG tube to suction  telemetry. Subjective   General  Chart Reviewed: Yes  Patient assessed for rehabilitation services?: Yes  Family / Caregiver Present: No  Referring Practitioner: Dr. Dinesh Dee  Diagnosis: SBO, nausea, generalized weakness;  General Comment  Comments: RN cleared pt for OT eval; pt getting in bed from bathroom  Patient Currently in Pain: Denies  Vital Signs  Patient Currently in Pain: Denies  Social/Functional History  Social/Functional History  Lives With: Alone  Type of Home: House  Home Layout: Two level, Able to Live on Main level with bedroom/bathroom  Home Access: Stairs to enter with rails  Entrance Stairs - Number of Steps: 2 BULL  Entrance Stairs - Rails: Left  Bathroom Shower/Tub: Walk-in shower, Tub only  Bathroom Toilet: Standard  Bathroom Equipment: Grab bars in shower, Shower chair, Grab bars around toilet  Home Equipment: Mili Lighter, Rolling walker  ADL Assistance: Independent  Homemaking Assistance: Needs assistance (Unable to clean the house fully; does not have assist available; son takes care of yard.)  Homemaking Responsibilities: Yes  Ambulation Assistance: Independent (RW or cane; reporting walker is better.)  Transfer Assistance: Independent  Active : Yes (Son now drives)  Occupation: Retired  Type of occupation:   Leisure & Hobbies: follow local sports, watch golf  IADL Comments: Attempts to work out frequently  Additional Comments: No falls in the past 6 months, but came close multiple times. ; sleeps in regular bed with pillows to elevate head; reports she does not assist at home, \"my son can't live me, he will not be able to supervise me\"       Objective        Orientation  Overall Orientation Status: Within Functional Limits     Balance  Sitting Balance: Stand by assistance  Standing Balance: Contact guard assistance  Functional Mobility  Functional - Mobility Device: No device  Activity: To/from bathroom  Assist Level: Contact guard assistance (due to NG suction tubing & IV pole)  Toilet Transfers  Toilet - Technique: Ambulating (CGA without AD due to IV pole & NG suction tubing)  Equipment Used: Grab bars  Toilet Transfer: Stand by assistance  ADL  Feeding: NPO  LE Dressing: Stand by assistance  Toileting: Supervision    RUE Tone: Normotonic  LUE Tone: Normotonic  Coordination  Movements Are Fluid And Coordinated: Yes     Bed mobility  Sit to Supine: Stand by assistance  Transfers  Sit to stand: Stand by assistance  Stand to sit: Stand by assistance  Vision - Basic Assessment  Prior Vision: Wears glasses all the time  Cognition  Overall Cognitive Status: Exceptions  Arousal/Alertness: Appropriate responses to stimuli  Following Commands:  Follows one step commands consistently  Attention Span: Appears intact  Safety Judgement: Decreased awareness of need for assistance;Decreased awareness of need for safety  Insights: Decreased awareness of deficits  Initiation: Does not require cues  Sequencing: Does not require cues        Sensation  Overall Sensation Status: WFL  Type of ROM/Therapeutic Exercise  Comment: declined UE bed exercises       LUE General AROM: WFL elbow-->hand by observation  RUE General AROM: WFL elbow-->hand by observation                      Plan   Plan  Times per week: 2-4x/ week  Current Treatment Recommendations: Strengthening, Endurance Training, Balance Training, Functional Mobility Training, Safety Education & Training, Self-Care / ADL             AM-PAC Score        AM-[facility] Inpatient Daily Activity Raw Score: 13 (06/07/21 1341)  AM-PAC Inpatient ADL T-Scale Score : 32.03 (06/07/21 1341)  ADL Inpatient CMS 0-100% Score: 63.03 (06/07/21 1341)  ADL Inpatient CMS G-Code Modifier : CL (06/07/21 1341)    Goals  Short term goals  Time Frame for Short term goals: 1 week(6-14-21)  Short term goal 1: supervision with bathroom mobility by 6-14-21  Short term goal 2:

## 2021-06-07 NOTE — CARE COORDINATION
[facility]    Active with [facility] John Bahena RN, BSN CTN  [facility] [phone]

## 2021-06-07 NOTE — PROGRESS NOTES
Physical Therapy    Facility/Department: [facility] C3 TELE/MED SURG/ONC  Initial Assessment and Treatment    NAME: Naeem Matthews  : 1940  MRN: [id number]    Date of Service: 2021    Discharge Recommendations:  Home with assist PRN, Home with Home health PT   PT Equipment Recommendations  Equipment Needed: No  Other: Pt owns RW    Assessment   Body structures, Functions, Activity limitations: Decreased functional mobility ; Decreased balance;Decreased endurance  Assessment: Pt is an 77 y/o female presenting to [facility] with SBO. PTA, pt was IND with all functional mobility and living alone. Pt recently began using a RW for additional stability. Pt currently is Mod I for bed mobility, supervision for sit<>stand and SBA for ambulation with RW. Pt will benefit from continued PT to address deficits and increase independence prior to d/c. Recommend home with PRN assist and HHPT. Treatment Diagnosis: Decreased functional mobility  Prognosis: Good  Decision Making: Medium Complexity  PT Education: Goals;PT Role;Plan of Care; Functional Mobility Training;Gait Training;Transfer Training;Disease Specific Education  Patient Education: Pt verbalized understanding; will benefit from additional education on positioning with RW and hand placement for safety. REQUIRES PT FOLLOW UP: Yes  Activity Tolerance  Activity Tolerance: Patient Tolerated treatment well  Activity Tolerance: RR=383/92, HR=77, SpO2=94%       Patient Diagnosis(es): The primary encounter diagnosis was Incarcerated right inguinal hernia. Diagnoses of Right lower quadrant abdominal pain and Uncontrolled hypertension were also pertinent to this visit.      has a past medical history of CAD (coronary artery disease), Cancer ([address] Carotid artery disease ([address] HTN (hypertension), Hyperlipemia, Hyperlipidemia, LBP (low back pain), Lung nodules, Osteoporosis, Seasonal allergies, Stress bladder incontinence, female, Unspecified cerebral artery occlusion with cerebral infarction, and Wears glasses. has a past surgical history that includes Varicose vein surgery; Cardiac catheterization; and Colonoscopy (8/13/2014). Restrictions  Position Activity Restriction  Other position/activity restrictions: Bedrest with BSC; RN cleared for therapy. Vision/Hearing  Vision: Impaired  Vision Exceptions: Wears glasses at all times; Cataracts  Hearing: Within functional limits     Subjective  General  Chart Reviewed: Yes  Patient assessed for rehabilitation services?: Yes  Additional Pertinent Hx: Per MD: James Disla is a 78 y.o. female who presented to the ED to be evaluated for acutely worsening RLQ abdominal pain ongoing for greater than 1 weeks duration. Patient endorses right inguinal \"bulge\" where she reports vascular surgeon Dr. Kamilah Lockwood gained access for mesenteric stent placement on 5/10/2021. \"  Response To Previous Treatment: Not applicable  Family / Caregiver Present: Yes Cy Lenny Beasley)  Referring Practitioner: Jose Venegas MD  Referral Date : 06/06/21  Subjective  Subjective: Pt agreeable to therapy.   Pain Screening  Patient Currently in Pain: Denies  Vital Signs  Patient Currently in Pain: Denies       Orientation  Orientation  Overall Orientation Status: Within Functional Limits  Social/Functional History  Social/Functional History  Lives With: Alone  Type of Home: House  Home Layout: Two level, Able to Live on Main level with bedroom/bathroom  Home Access: Stairs to enter with rails  Entrance Stairs - Number of Steps: 2 BULL  Entrance Stairs - Rails: Left  Bathroom Shower/Tub: Walk-in shower, Tub only  Bathroom Toilet: Standard  Bathroom Equipment: Grab bars in shower, Shower chair, Grab bars around toilet  Home Equipment: Cane, Wheelchair-manual, Rolling walker  ADL Assistance: Independent  Homemaking Assistance: Needs assistance (Unable to clean the house fully; does not have assist available; son takes care of yard.)  Homemaking Responsibilities: Yes  Ambulation Management  Safety Devices  Type of devices: All fall risk precautions in place, Bed alarm in place, Call light within reach, Left in bed, Patient at risk for falls, Gait belt, Nurse notified    AM-PAC Score  AM-PAC Inpatient Mobility Raw Score : 20 (06/07/21 1206)  AM-PAC Inpatient T-Scale Score : 47.67 (06/07/21 1206)  Mobility Inpatient CMS 0-100% Score: 35.83 (06/07/21 1206)  Mobility Inpatient CMS G-Code Modifier : Willene Oppenheim (06/07/21 1206)        Goals  Short term goals  Time Frame for Short term goals: 5-7 days, unless otherwise stated, by 6/14/21  Short term goal 1: Pt will complete sit<>stand with RW with independence. Short term goal 2: Pt will ambulate 150 feet with LRAD and independence. Short term goal 3: Pt will ascend/descend 2 steps with one rail and independence. Short term goal 4: Pt will complete 15 reps of BLE ther ex for strength and balance by 6/10/21. Patient Goals   Patient goals : To feel better and go home. Therapy Time   Individual Concurrent Group Co-treatment   Time In 0950         Time Out 1025         Minutes 35         Timed Code Treatment Minutes: 25 Minutes (10 minute eval)     If the pt is discharged prior to the next treatment session, this will serve as the discharge summary.      Adrianna Green, PT

## 2021-06-08 NOTE — PROGRESS NOTES
Pt A&Ox4. VSS. Shift assessment completed. Pt tolerating clear liquid diet. Denies nausea or pain. Still on heparin drip. Call light within reach, bed in lowest position, wheels locked. Bed alarm on and audible.

## 2021-06-08 NOTE — PROGRESS NOTES
APTT 54.3  No change in heparin gtt.     Electronically signed by Lizzy Bourgeois RN on 6/7/2021 at 8:29 PM

## 2021-06-08 NOTE — PROGRESS NOTES
Occupational Therapy  OT follow up attempted. Pt in bed asleep. Pt woke easily to respond verbally but kept her eyes closed. Pt BP was 175/105. RN was notified immediately. RN, Rosina Mckay, stated she would be in to check to the pt. Pt will be on hold at this time. OT will continue to follow up as schedule allows and pt is willing.      Steven Quinteros, [address] [facility]

## 2021-06-08 NOTE — PROGRESS NOTES
APTT 53.8  No change in heparin gtt.     Electronically signed by See Cope RN on 6/7/2021 at 8:29 PM

## 2021-06-08 NOTE — PROGRESS NOTES
[facility] SURGERY    PATIENT NAME: Vinh rBand     TODAY'S DATE: 6/8/2021    CHIEF COMPLAINT: none    INTERVAL HISTORY/HPI:    Pt without pain or nausea, having flatus, no fevers. REVIEW OF SYSTEMS:  Pertinent positives and negatives as per interval history section    OBJECTIVE:  VITALS:  BP (!) 150/76   Pulse 85   Temp 98.7 °F (37.1 °C) (Oral)   Resp 18   Ht 5' 3\" (1.6 m)   Wt 92 lb (41.7 kg)   SpO2 93%   BMI 16.30 kg/m²     INTAKE/OUTPUT:    I/O last 3 completed shifts: In: 1941 [P.O.:480; I.V.:1401; NG/GT:60]  Out: 200 [Emesis/NG output:200]  No intake/output data recorded. CONSTITUTIONAL:  awake and alert  LUNGS:  Respirations easy and unlabored,  CARD:  regular rate  ABDOMEN:  normal bowel sounds, soft, non-distended, non-tender, right inguinal hernia reduced    Data:  CBC:   Recent Labs     06/06/21  2110 06/07/21  0314 06/08/21  0410   WBC 6.5 6.0 5.3   HGB 13.5 12.3 12.2   HCT 40.1 36.6 36.3    210 197     BMP:    Recent Labs     06/06/21  1218 06/07/21  0314    137   K 3.8 3.9  3.9   CL 99 100   CO2 28 30   BUN 24* 15   CREATININE 0.8 0.8   GLUCOSE 137* 95     Hepatic:   Recent Labs     06/06/21  1218 06/07/21  0314   AST 26 21   ALT 12 10   BILITOT 0.3 0.4   ALKPHOS 101 86     Mag:      Recent Labs     06/06/21  1218 06/07/21  0314   MG 2.10 1.90      Phos:   No results for input(s): PHOS in the last 72 hours. INR:   Recent Labs     06/06/21  1632   INR 1.26*       Radiology Review:  *Imaging personally reviewed by me. NA      ASSESSMENT AND PLAN:  77 yo with right inguinal hernia  1. Continue clear liquids today  2. Continue heparin gtt while holding oral blood thinners  3.   Possible OR in next 1-2 days     Electronically signed by Olga Maldonado MD     [id number]

## 2021-06-08 NOTE — CARE COORDINATION
Chart review day 2: Patient on C3 re SBO followed by IM, GI and General suregry. Patient with NG removed on 06/07, potential for surgical intervention 1-2 days. Patient from home, IPTA active with [facility]- following. Nolene Litten, LSW

## 2021-06-08 NOTE — CONSULTS
Gastroenterology Consult Note    Patient:   Esme Mcfadden   :    1940   Facility:     [place] [address] TELE/MED SURG/ONC  [address] [address]   Referring/PCP: NED Patton - MARTIN  Date:     2021  Consultant:   Tio Salamanca DO, DO    Subjective: This 78 y.o. female was admitted 2021 with a diagnosis of \"SBO (small bowel obstruction) ([facility] [K56.609]\" and is seen in consultation regarding small bowel obstruction    77 yo female patient of Dr. Alycia Hernandez with celiac artery stenosis, PAF on eliquis, malignant hypertension who presented with right lower abdominal pain and groin discomfort. CT scan demonstrated concern for right inguinal hernia and small bowel obstruction. NG removed today. Nausea symptoms improved. Hernia reduced.     Past Medical History:   Diagnosis Date    CAD (coronary artery disease)     Cancer ([facility]     skin cancer    Carotid artery disease (HCC)     HTN (hypertension)     Hyperlipemia     Hyperlipidemia     LBP (low back pain)     Lung nodules     Osteoporosis     Seasonal allergies     Stress bladder incontinence, female     Unspecified cerebral artery occlusion with cerebral infarction     Wears glasses      Past Surgical History:   Procedure Laterality Date    CARDIAC CATHETERIZATION      COLONOSCOPY  2014    VARICOSE VEIN SURGERY         Social:   Social History     Tobacco Use    Smoking status: Never Smoker    Smokeless tobacco: Never Used   Substance Use Topics    Alcohol use: No     Family:   Family History   Problem Relation Age of Onset    Heart Disease Sister     Stroke Mother        Scheduled Medications:    sodium chloride flush  10 mL Intravenous 2 times per day    pantoprazole  40 mg Intravenous Daily     Infusions:    sodium chloride      heparin (PORCINE) Infusion 5.7 mL/hr (21 0436)     PRN Medications: perflutren lipid microspheres, LORazepam, benzocaine, sodium chloride flush, sodium chloride, potassium chloride **OR** potassium alternative oral replacement **OR** potassium chloride, magnesium sulfate, promethazine **OR** ondansetron, acetaminophen **OR** acetaminophen, bisacodyl, heparin (porcine), heparin (porcine), morphine **OR** morphine, phenol  Allergies: Allergies   Allergen Reactions    Pneumococcal Vaccines        ROS:   Review of Systems   Constitutional: Negative. HENT: Negative. Eyes: Negative. Respiratory: Negative. Cardiovascular: Negative. Gastrointestinal: Positive for abdominal pain. Endocrine: Negative. Genitourinary: Negative. Musculoskeletal: Negative. Skin: Negative. Allergic/Immunologic: Negative. Neurological: Negative. Hematological: Negative. Psychiatric/Behavioral: Negative. Objective:     Physical Exam:   Vitals:    06/07/21 1920   BP: (!) 160/84   Pulse: 85   Resp: 16   Temp: 99.7 °F (37.6 °C)   SpO2: 93%     I/O last 3 completed shifts:   In: 6290 [I.V.:773; NG/GT:60; IV Piggyback:500]  Out: 300 [Emesis/NG output:300]   General appearance: alert, appears stated age and cooperative  HEENT: PERRLA  Neck: no adenopathy, no carotid bruit, no JVD, supple, symmetrical, trachea midline and thyroid not enlarged, symmetric, no tenderness/mass/nodules  Lungs: clear to auscultation bilaterally  Heart: regular rate and rhythm, S1, S2 normal, no murmur, click, rub or gallop  Abdomen: soft, non-tender; bowel sounds normal; no masses,  no organomegaly  Extremities: extremities normal, atraumatic, no cyanosis or edema     Lab and Imaging Review   Labs:  CBC:   Recent Labs     06/06/21  1218 06/06/21  2110 06/07/21  0314   WBC 6.6 6.5 6.0   HGB 13.8 13.5 12.3   HCT 40.9 40.1 36.6   MCV 94.9 94.0 93.7    232 210     BMP:   Recent Labs     06/06/21  1218 06/07/21  0314    137   K 3.8 3.9  3.9   CL 99 100   CO2 28 30   BUN 24* 15   CREATININE 0.8 0.8     LIVER PROFILE:   Recent Labs     06/06/21  1218 06/07/21  0314   AST 26 21   ALT 12 10   LIPASE 91.0*  --    PROT 8.1 6.8   BILITOT 0.3 0.4   ALKPHOS 101 86     PT/INR:   Recent Labs     06/06/21  1632   INR 1.26*       IMAGING:  XR ABDOMEN (KUB) (SINGLE AP VIEW)    Result Date: 6/6/2021  EXAMINATION: ONE SUPINE XRAY VIEW(S) OF THE ABDOMEN 6/6/2021 9:53 pm COMPARISON: 06/06/2021 HISTORY: ORDERING SYSTEM PROVIDED HISTORY: verify NG tube placement TECHNOLOGIST PROVIDED HISTORY: Reason for exam:->verify NG tube placement Reason for Exam: verify NGT placement Acuity: Acute Type of Exam: Subsequent/Follow-up FINDINGS: The enteric catheter side port is seen just beyond the level of the GE junction. The catheter tip is in the region the proximal gastric body. The patient is rotated. Extensive emphysematous changes seen in the chest. Degenerative changes are seen in the spine. No acute osseous abnormality is identified. Enteric catheter side port just beyond the GE junction with the tip in the proximal gastric body. XR ABDOMEN (KUB) (SINGLE AP VIEW)    Result Date: 6/6/2021  EXAMINATION: ONE SUPINE XRAY VIEW(S) OF THE ABDOMEN 6/6/2021 4:28 pm COMPARISON: None. HISTORY: ORDERING SYSTEM PROVIDED HISTORY: confirm ng TECHNOLOGIST PROVIDED HISTORY: Abd KUB Reason for exam:->confirm ng Reason for Exam: Abdominal Pain (stent placed in right groin in may, not sure if that is causing her to have abd pain now. ) FINDINGS: Prominent portions of the pelvis are excluded from the field of view. Visualized portions of the bowel gas pattern are unremarkable. No unusual abdominal soft tissue or calcific density is seen. Visualized osseous structures appear unremarkable. Unremarkable appearance of the visualized lower chest. NG tube extends below the left hemidiaphragm, into the upper abdomen, tip overlying the expected level of the stomach, left upper quadrant. Side port of the NG tube projects at the distal esophagus esophagogastric junction level.      Unremarkable limited KUB. NG tube tip projects at the left upper quadrant, overlying the expected location of the stomach. Side port of the NG tube projects at the distal esophagus esophagogastric junction level. Consider dancing mid NG tube several cm such that the side port is beyond esophagogastric junction. XR ACUTE ABD SERIES CHEST 1 VW    Result Date: 6/7/2021  EXAMINATION: TWO XRAY VIEWS OF THE ABDOMEN AND SINGLE  XRAY VIEW OF THE CHEST 6/7/2021 8:33 am COMPARISON: None. HISTORY: ORDERING SYSTEM PROVIDED HISTORY: SBO TECHNOLOGIST PROVIDED HISTORY: Reason for exam:->SBO FINDINGS: The lungs are free of acute airspace disease. There are extensive chronic emphysematous changes. No abnormally dilated loops of small bowel are seen. There is gas and stool throughout the colon. The tip of the nasogastric tube projects over the expected location of the gastroesophageal junction. The proximal side hole is within the distal esophagus. This should be advanced. Malposition of nasogastric tube as above, the tube should be advanced. The findings were sent to the Radiology Results [id number] at 8:43 am on 6/7/2021to be communicated to a licensed caregiver. CTA ABDOMINAL AORTA W BILAT RUNOFF W CONTRAST    Result Date: 6/6/2021  EXAMINATION: CTA OF THE AORTA WITH LOWER EXTREMITY RUNOFF 6/6/2021 1:50 pm TECHNIQUE: CTA of the pelvis and bilateral lower extremities was performed after the administration of intravenous contrast.   Multiplanar reformatted images are provided for review. MIP images are provided for review. Dose modulation, iterative reconstruction, and/or weight based adjustment of the mA/kV was utilized to reduce the radiation dose to as low as reasonably achievable. COMPARISON: 04/07/2021 HISTORY: ORDERING SYSTEM PROVIDED HISTORY: Recent stent placement right iliac femoral with firm enlarged with concern for pseudoaneurysm.  TECHNOLOGIST PROVIDED HISTORY: Reason for exam:->Recent stent placement right iliac femoral with firm enlarged with concern for pseudoaneurysm. Decision Support Exception - unselect if not a suspected or confirmed emergency medical condition->Emergency Medical Condition (MA) Reason for Exam: recent right iliac stent,having abd pain now Acuity: Acute Type of Exam: Initial FINDINGS: Nonvascular Lower Chest: Emphysematous and interstitial fibrotic changes are in the lung bases. There are trace bilateral pleural effusions, with peripheral atelectasis or pleuroparenchymal scarring. Organs: Liver is homogeneous and normal in attenuation. Gallbladder is absent. The pancreas, spleen and adrenal glands are unremarkable. The nephrograms are symmetrical.  There is no hydronephrosis. GI/Bowel: The stomach is unremarkable. There are mildly dilated loops of small bowel in the lower abdomen. There is a right inguinal hernia with distal ileum content and transition in caliber. There is mild prominence of stool in the colon. Pelvis: Urinary bladder and uterus are unremarkable. A small amount of free fluid is present. Peritoneum/Retroperitoneum: No adenopathy. Bones/Soft Tissues: No acute osseous abnormality. VASCULAR There is moderate cardiomegaly. The abdominal aorta is tortuous and widely patent, with mild calcification and no overt aneurysm. A metallic stent has been placed in the proximal celiac trunk. Celiac is widely patent. There is mild narrowing of the superior mesenteric artery at its origin, densely calcified. Inferior mesenteric artery is patent. In the main right renal artery, there is a near ostial severe stenosis, 5 mm in length, not significantly changed. In the left renal artery, there is mild proximal stenosis with dense calcification. The bilateral common and external iliac arteries are widely patent. Internal iliac arteries are patent, mild stenosis at origin of the left. Common femoral arteries are widely patent bilaterally. There is no pseudoaneurysm.  There is in-line flow through the femoropopliteal segments. Distal SFAs are mildly irregular bilaterally, with mild stenoses. Popliteal vessels are mildly irregular with mild stenoses. Of the right, there is 1 vessel runoff via the anterior tibial.  The peroneal and posterior tibial are proximally occluded. On the left, there is 1 vessel runoff via the peroneal.  The proximal posterior and anterior tibial arteries are occluded. There is intermittent reconstitution of the anterior tibial.     No pseudoaneurysm is identified in the groins. There is a right inguinal hernia with small bowel content, possibly incarcerated segment. There is mild dilation of the upstream small bowel, consistent with evolving small bowel obstruction. Small amount of free fluid is also noted. Mesenterorenal arterial disease. Celiac trunk stent placed in interval is widely patent. Mild proximal SMA stenosis stable. Severe right and mild left proximal renal artery stenoses stable. Aortoiliac vessels are widely patent. Femoropopliteal segments are patent, with mild, variable stenoses. Right anterior tibial and left peroneal one- vessel runoff. Small bilateral pleural effusions and bibasilar atelectasis. Hospital Problems         Last Modified POA    * (Principal) SBO (small bowel obstruction) ([address] 6/6/2021 Yes    Aneurysm of thoracic aorta ([address] 6/6/2021 Yes    Overview Addendum 6/13/2016  1:23 PM by NED Gonzalez - CNP     Dr. Rusty Fischer evaluated and Dr. Noriega Scale manages         PAF (paroxysmal atrial fibrillation) ([address] 6/6/2021 Yes    Incarcerated right inguinal hernia 6/7/2021 Yes    PAD (peripheral artery disease) ([address] 6/6/2021 Yes    Celiac artery stenosis ([address] 6/6/2021 Yes    Overview Signed 6/6/2021  4:57 PM by Sidney Best MD     S/p balloon angioplasty treatment per Dr Bernardo George hypertensive urgency 6/6/2021 Yes        Assessment:   77 yo female with incarcerated hernia    Plan:   1. Management per General Surgery  2.  Will follow peripherally, please call with specific questions or concerns  3. Follow up with Dr. Jannie Sanchez as outpatient.       Ge Cleaning DO  8:45 PM 6/7/2021            [address] [place] [address]    [address]      [address] [place] [address]     Phone: [phone]     Fax: [phone]

## 2021-06-08 NOTE — TELEPHONE ENCOUNTER
Called and spoke w/ Al [facility]) - scheduled her for a DV RIH Repair w/ Dr Linda Aguillon tomorrow @ 1:15pm (pt is inpatient room 321)

## 2021-06-08 NOTE — PROGRESS NOTES
Lab Results   Component Value Date    NITRU Negative 06/06/2021    WBCUA 0-2 06/06/2021    BACTERIA 4+ 07/07/2020    RBCUA 0-2 06/06/2021    BLOODU Negative 06/06/2021    SPECGRAV 1.010 06/06/2021    GLUCOSEU Negative 06/06/2021       Radiology:  XR ACUTE ABD SERIES CHEST 1 VW   Final Result   Malposition of nasogastric tube as above, the tube should be advanced. The findings were sent to the Radiology Results [address] at 8:43   am on 6/7/2021to be communicated to a licensed caregiver. XR ABDOMEN (KUB) (SINGLE AP VIEW)   Final Result   Enteric catheter side port just beyond the GE junction with the tip in the   proximal gastric body. XR ABDOMEN (KUB) (SINGLE AP VIEW)   Final Result   Unremarkable limited KUB. NG tube tip projects at the left upper quadrant, overlying the expected   location of the stomach. Side port of the NG tube projects at the distal   esophagus esophagogastric junction level. Consider dancing mid NG tube   several cm such that the side port is beyond esophagogastric junction. CTA ABDOMINAL AORTA W BILAT RUNOFF W CONTRAST   Final Result   No pseudoaneurysm is identified in the groins. There is a right inguinal hernia with small bowel content, possibly   incarcerated segment. There is mild dilation of the upstream small bowel,   consistent with evolving small bowel obstruction. Small amount of free fluid   is also noted. Mesenterorenal arterial disease. Celiac trunk stent placed in interval is   widely patent. Mild proximal SMA stenosis stable. Severe right and mild   left proximal renal artery stenoses stable. Aortoiliac vessels are widely patent. Femoropopliteal segments are patent, with mild, variable stenoses. Right anterior tibial and left peroneal one- vessel runoff. Small bilateral pleural effusions and bibasilar atelectasis.                  Assessment/Plan:    Active Hospital Problems    Diagnosis     Incarcerated right inguinal hernia [K40.30]     SBO (small bowel obstruction) ([facility] [address] [K56.609]     PAD [facility]peripheral artery disease) ([facility] [I73.9]     Celiac artery stenosis ([facility]) [I77.4]     Malignant hypertensive urgency [I16.0]     PAF (paroxysmal atrial fibrillation) ([facility]) [I48.0]     Aneurysm of thoracic aorta ([facility]) [I71.2]      Malignant hypertensive urgency with troponin leak  - POA with -170's 100's, initial trop was 0.02, trended out 0.01> 0.01  -EKG obtained in ED;without evidence of LAD or acute ischemia  -Home medications placed on hold due to strict n.p.o. status  -IV hydralazine scheduled PRN (with parameters) to address extreme BP elevation  -ECHO scheduled to further assess cardiac structure and function  - suspect some anxiety and pain component      Inguinal hernia with SBO  -Consultation placed to surgery with plans for conservative management overnight  -NG tube in place, and patient remains strict n.p.o., improving NG out trialing clears   -Serial lactate levels to be trended  -IV morphine available sparingly for pain control as needed  -Due to celiac stent as well as PAF, anticoagulation unable to be discontinued abruptly; patient initiated on heparin GTT bridge     Unintentional weight loss with cachexia  -Etiology unclear at this time; concern for underlying occult process/malignancy  -TSH/ free T4, vitamin D, B12/folate, prealbumin and A1c lab work ordered with results currently pending  -ECHO scheduled to further assess cardiac function  -PT/OT consultation placed for evaluation and treatment  -Consultation placed to GI for further evaluation of malabsorption/anorexia     Chronic anticoagulation  -Patient with history of PAF as well as celiac artery stenosis typically maintained on Eliquis 2.5 mg twice daily  -Due to n.p.o. status, patient was initiated on low-dose weight-based heparin drip (without initial bolus) for bridging therapy     Underlying anxiety: family states that she is an anxious person has been benefiting   - has benefited from prn low dose ativan   - pt would likely from oral antianxiety such as Remeron     DVT Prophylaxis: low-dose weight-based heparin drip initiated  Diet: ADULT DIET;  Clear Liquid  Code Status: Full Code    PT/OT Eval Status: ordered     Dispo - pending clinical course     Paul Amador, NED - CNP

## 2021-06-08 NOTE — PROGRESS NOTES
End of shift report given to Sheila. Call light within reach, bed in lowest position, no needs at this time.

## 2021-06-08 NOTE — PROGRESS NOTES
Physician Progress Note      Raji Baez  [facility] #:                  [id number]  :                       1940  ADMIT DATE:       2021 12:03 PM  [address] [place] DATE:  RESPONDING  PROVIDER #:        Mercedez Peres CNP          QUERY TEXT:    Patient admitted with a SBO and incarcerated hernia. Noted documentation of   severe malnutrition per dietician consult note. If possible, please document   in progress notes and discharge summary:    The medical record reflects the following:  Risk Factors: Vascular disease, SBO, hernia, afib, smoker  Clinical Indicators:BMI 16.3, Per dietician consult note \"Malnutrition Status:    Severe malnutrition. -Severe malnutrition related to altered GI function as   evidenced by intake 0-25%, BMI, poor intake prior to admission\". Per H&P   \"Unintentional weight loss with cachexia -Etiology unclear at this time;   concern for underlying occult process/malignancy\"  Treatment: Dietician consult, I&O's, daily weights, nutritional screening,   serial labs, supportive care. Thank you  [email or web address]  Options provided:  -- Severe malnutrition confirmed present on admission  -- Severe malnutrition ruled out  -- Other - I will add my own diagnosis  -- Disagree - Not applicable / Not valid  -- Disagree - Clinically unable to determine / Unknown  -- Refer to Clinical Documentation Reviewer    PROVIDER RESPONSE TEXT:    The diagnosis of severe malnutrition was confirmed as present on admission.     Query created by: Caprice Arirola on 2021 1:35 PM      Electronically signed by:  Mercedez Peres CNP 2021 2:53 PM

## 2021-06-08 NOTE — PROGRESS NOTES
Pt A&Ox4, VSS. Quiet and pleasant. Denies pain/discomfort. Denies N/V; BS active x4; passing flatus. Denies dyspnea. Call light within reach. Bed side table within reach. Wheels locked. Bed in lowest position. Bed check in place. Pt instructed to call out for assistance. Pt expressed understanding & calls out appropriately thus far. x1 with walker to BR. Shift assessment complete. All care cont per orders.  Electronically signed by Dana Moon RN on 6/7/2021 at 10:18 PM

## 2021-06-09 NOTE — BRIEF OP NOTE
Brief Postoperative Note      Patient: Sharlene Rosas  YOB: 1940  MRN: [id number]    Date of Procedure: 6/9/2021    Pre-Op Diagnosis: INGUINAL HERNIA    Post-Op Diagnosis: Right femoral hernia       Procedure(s):  ROBOTIC RIGHT INGUINAL HERNIA REPAIR    Surgeon(s):  Darrel Hair MD    Assistant:  Surgical Assistant: Galo Warren    Anesthesia: General    Estimated Blood Loss (mL): less than 50     Complications: None    Specimens:   * No specimens in log *    Implants:  Implant Name Type Inv. Item Serial No.  Lot No. LRB No. Used Action   MESH HERNAN L [id number] R INGUINAL WHT POLYPR MFIL  MESH HERNAN L [id number] R INGUINAL WHT POLYPR MFIL  BARD DAVOL-WD [id number] Right 1 Implanted         Drains:   [REMOVED] NG/OG/NJ/NE Tube Nasogastric 16 fr Left nostril (Removed)   Surrounding Skin Dry; Intact 06/06/21 1623   Securement device Yes 06/06/21 1623   Status Suction-low continuous 06/07/21 1103   Drainage Appearance Brown 06/07/21 1132   Free Water Flush (mL) 60 mL 06/07/21 1103   Output (mL) 200 ml 06/07/21 1132       [REMOVED] Urethral Catheter 16 fr (Removed)       Findings: 1) mod-sized femoral hernia defect w/o incarcerated contents            2) developing obturator canal defect            3) repair/coverage of both defects with Large 3D Max mesh    Job#: [id number]    Electronically signed by Enzo Clement MD on 6/10/2021 at 8:47 PM

## 2021-06-09 NOTE — PROGRESS NOTES
Bedside report given to Holley on c3 / pt responsive and stable on 2 lt nc. In for transport .   Dr. David Miller aware of elevated BP and no orders given

## 2021-06-09 NOTE — ANESTHESIA POSTPROCEDURE EVALUATION
Department of Anesthesiology  Postprocedure Note    Patient: Shefali Singh  MRN: [id number]  YOB: 1940  Date of evaluation: 6/9/2021  Time:  5:25 PM     Procedure Summary     Date: 06/09/21 Room / Location: [address] [place] [address]    Anesthesia Start: 0131 Anesthesia Stop: 3387    Procedure: ROBOTIC RIGHT INGUINAL HERNIA REPAIR (Right Abdomen) Diagnosis: (INGUINAL HERNIA)    Surgeons: Kristi Chen MD Responsible Provider: Taylor Yepez MD    Anesthesia Type: general ASA Status: 3          Anesthesia Type: general    Carlyn Phase I: Carlyn Score: 7    Carlyn Phase II:      Last vitals: Reviewed and per EMR flowsheets.      Vitals:    06/09/21 0301 06/09/21 0734 06/09/21 0736 06/09/21 1104   BP: (!) 164/93  (!) 160/93 (!) 179/114   Pulse: 87  110 101   Resp: 18  18 18   Temp: 97.8 °F (36.6 °C) 97.5 °F (36.4 °C) 97.5 °F (36.4 °C) 98.3 °F (36.8 °C)   TempSrc: Oral Oral Oral Oral   SpO2: 95%  95% 94%   Weight: 91 lb 11.4 oz (41.6 kg)      Height:         Anesthesia Post Evaluation    Patient location during evaluation: bedside  Patient participation: complete - patient participated  Level of consciousness: responsive to light touch, responsive to physical stimuli, responsive to verbal stimuli and awake  Airway patency: patent  Nausea & Vomiting: no nausea  Complications: no  Cardiovascular status: hemodynamically stable  Respiratory status: acceptable and nasal cannula  Hydration status: euvolemic

## 2021-06-09 NOTE — PROGRESS NOTES
Pt A&Ox4. VSS. Shift assessment completed. Heparin drip stopped at 6:00 AM. Pt aware of plans for surgery today. Denies pain at this time. Call light within reach, bed in lowest position, wheels locked. Bed alarm on and audible.

## 2021-06-09 NOTE — PROGRESS NOTES
Physical Therapy  Facility/Department: [facility] [place] C3 TELE/MED SURG/ONC  Daily Treatment Note  NAME: Barbi Woodall  : 1940  MRN: [id number]    Date of Service: 2021    Discharge Recommendations:  Home with assist PRN, Home with Home health PT   PT Equipment Recommendations  Equipment Needed: No  Other: Pt owns RW    Assessment   Body structures, Functions, Activity limitations: Decreased functional mobility ; Decreased balance;Decreased endurance  Assessment: Saw pt for just a fewminutes to get vitals, sit to EOB and don shoes. Dr Cathleen Teresa rounded on pt at [facility] and then cancelled PT due to risk of having hernia pop out prior to surgery scheduled this afternoon. Will resumed PT per POC once appropriate. Treatment Diagnosis: Decreased functional mobility  Prognosis: Good  Decision Making: Medium Complexity  PT Education: Goals;PT Role;Plan of Care; Functional Mobility Training;Gait Training;Transfer Training;Disease Specific Education  Patient Education: Pt verbalized understanding; will benefit from additional education on positioning with RW and hand placement for safety. REQUIRES PT FOLLOW UP: Yes  Activity Tolerance  Activity Tolerance: Vitals: 160/93, ; O2 95%     Patient Diagnosis(es): The primary encounter diagnosis was Incarcerated right inguinal hernia. Diagnoses of Right lower quadrant abdominal pain and Uncontrolled hypertension were also pertinent to this visit. has a past medical history of CAD (coronary artery disease), Cancer ([address] Carotid artery disease ([address] HTN (hypertension), Hyperlipemia, Hyperlipidemia, LBP (low back pain), Lung nodules, Osteoporosis, Seasonal allergies, Stress bladder incontinence, female, Unspecified cerebral artery occlusion with cerebral infarction, and Wears glasses. has a past surgical history that includes Varicose vein surgery; Cardiac catheterization; and Colonoscopy (2014).     Restrictions  Restrictions/Precautions  Restrictions/Precautions: Fall Risk, General Precautions  Position Activity Restriction  Other position/activity restrictions: IV, NG tube to suction  telemetry. Subjective   General  Chart Reviewed: Yes  Additional Pertinent Hx: Per MD: Johann Menjivar is a 78 y.o. female who presented to the ED to be evaluated for acutely worsening RLQ abdominal pain ongoing for greater than 1 weeks duration. Patient endorses right inguinal \"bulge\" where she reports vascular surgeon Dr. Leighton Vinson gained access for mesenteric stent placement on 5/10/2021. \"  Response To Previous Treatment: Patient with no complaints from previous session. Family / Caregiver Present: No  Referring Practitioner: Sidney Best MD  Subjective  Subjective: Pt agreeable to therapy. Pain Screening  Patient Currently in Pain: Denies  Vital Signs  Patient Currently in Pain: Denies       Orientation  Orientation  Overall Orientation Status: Within Functional Limits       Objective   Bed mobility  Supine to Sit: Modified independent  Sit to Supine: Modified independent  Transfers  Sit to Stand: Unable to assess (Dr. George Singh came to round on pt at [facility] and cancelled PT for today so that her hermia does not pop ot before surgery)           Exercises  Comments: dons shoes EOB with SBA           AM-PAC Score  AM-PAC Inpatient Mobility Raw Score : 20 (06/09/21 0928)  AM-PAC Inpatient T-Scale Score : 47.67 (06/09/21 0928)  Mobility Inpatient CMS 0-100% Score: 35.83 (06/09/21 0061)  Mobility Inpatient CMS G-Code Modifier : CJ (06/09/21 9619)          Goals  Short term goals  Time Frame for Short term goals: 5-7 days, unless otherwise stated, by 6/14/21  Short term goal 1: Pt will complete sit<>stand with RW with independence. . 6/9 [facility]  Short term goal 2: Pt will ambulate 150 feet with LRAD and independence. ; 6/9 [facility]  Short term goal 3: Pt will ascend/descend 2 steps with one rail and independence. ; 6/9 [facility]  Short term goal 4: Pt will complete 15 reps of BLE ther ex for strength and balance by 6/10/21.; 6/9 [facility]  Patient Goals   Patient goals : To feel better and go home.     Plan    Plan  Times per week: 2-3x  Times per day: Daily  Current Treatment Recommendations: Strengthening, Balance Training, Functional Mobility Training, Transfer Training, Endurance Training, Gait Training, Stair training, Home Exercise Program, Safety Education & Training, Equipment Evaluation, Education, & procurement, Patient/Caregiver Education & Training, Pain Management  Safety Devices  Type of devices: Left in bed     Therapy Time   Individual Concurrent Group Co-treatment   Time In 0904         Time Out 0915         Minutes [address] [place] [address] [id number]

## 2021-06-09 NOTE — PROGRESS NOTES
I have reviewed the H&P and examined this patient. I have reviewed with the patient and /or family the risks, benefits and alternatives to this procedure and they seem to understand and agree to proceed.   Elvis Rivera MD

## 2021-06-09 NOTE — ANESTHESIA PRE PROCEDURE
Department of Anesthesiology  Preprocedure Note       Name:  Lee Sorensen   Age:  78 y.o.  :  1940                                          MRN:  [id number]         Date:  2021      Surgeon: Yenny Deleon):  Davey Martinez MD    Procedure: Procedure(s):  ROBOTIC RIGHT INGUINAL HERNIA REPAIR    Medications prior to admission:   Prior to Admission medications    Medication Sig Start Date End Date Taking? Authorizing Provider   clopidogrel (PLAVIX) 75 MG tablet Take 75 mg by mouth daily   Yes Historical Provider, MD   simvastatin (ZOCOR) 20 MG tablet TAKE ONE TABLET BY MOUTH DAILY 21  Yes NED Carrillo CNP   metoprolol succinate (TOPROL XL) 100 MG extended release tablet Take 1 tablet by mouth daily 21  Yes NED Manuel CNP   spironolactone (ALDACTONE) 25 MG tablet Take 25 mg by mouth daily 20  Yes Historical Provider, MD   apixaban (ELIQUIS) 2.5 MG TABS tablet Take 1 tablet by mouth 2 times daily 20  Yes Dianna Abdi MD   acetaminophen (TYLENOL) 500 MG tablet Take 500 mg by mouth every 6 hours as needed for Pain   Yes Historical Provider, MD   magnesium (MAGNESIUM-OXIDE) 250 MG TABS tablet Take 250 mg by mouth daily. Yes Historical Provider, MD   Coenzyme Q10 (COQ10) 50 MG CAPS Take  by mouth 2 times daily. Yes Historical Provider, MD   vitamin D 25 MCG (1000 UT) CAPS Take 1,000 Units by mouth daily    Yes Historical Provider, MD   vitamin E 400 UNIT capsule Take 400 Units by mouth daily. Yes Historical Provider, MD   Calcium Citrate-Vitamin D 200-250 MG-UNIT TABS Take  by mouth. Yes Historical Provider, MD   Ascorbic Acid (VITAMIN C) 500 MG tablet Take 1,000 mg by mouth daily. Yes Historical Provider, MD   folic acid (FOLVITE) 1 MG tablet Take 400 mcg by mouth daily    Yes Historical Provider, MD   Zinc 50 MG CAPS Take  by mouth.      Yes Historical Provider, MD   Meals on Wheels MISC by Does not apply route 21   NED Carrillo CNP azithromycin (ZITHROMAX) 250 MG tablet Take 250 mg by mouth See Admin Instructions 1/25/21   Historical Provider, MD   cephALEXin (KEFLEX) 500 MG capsule Take 500 mg by mouth daily    Historical Provider, MD   glucosamine-chondroitin 500-400 MG tablet Take by mouth 2 times daily     Historical Provider, MD   aspirin 81 MG chewable tablet Take 325 mg by mouth daily     Historical Provider, MD   therapeutic multivitamin-minerals (THERAGRAN-M) tablet Take 1 tablet by mouth daily.       Historical Provider, MD       Current medications:    Current Facility-Administered Medications   Medication Dose Route Frequency Provider Last Rate Last Admin    perflutren lipid microspheres (DEFINITY) injection 1.65 mg  1.5 mL Intravenous ONCE PRN Angelina Norton MD        LORazepam (ATIVAN) injection 0.25 mg  0.25 mg Intravenous Q6H PRN NED Miguel - CNP   0.25 mg at 06/09/21 1122    sodium chloride flush 0.9 % injection 10 mL  10 mL Intravenous 2 times per day Angelina Norton MD   10 mL at 06/09/21 0737    sodium chloride flush 0.9 % injection 10 mL  10 mL Intravenous PRN Angelina Norton MD        0.9 % sodium chloride infusion  25 mL Intravenous PRN Angelina Norton MD        potassium chloride (KLOR-CON M) extended release tablet 40 mEq  40 mEq Oral PRN Angelina Norton MD        Or    potassium bicarb-citric acid (EFFER-K) effervescent tablet 40 mEq  40 mEq Oral PRN Angelina Norton MD        Or    potassium chloride 10 mEq/100 mL IVPB (Peripheral Line)  10 mEq Intravenous PRN Angelina Norton MD        magnesium sulfate 2000 mg in 50 mL IVPB premix  2,000 mg Intravenous PRN Angelina Norton MD        promethazine (PHENERGAN) tablet 12.5 mg  12.5 mg Oral Q6H PRN Angelina Norton MD        Or    ondansetron [facility]) injection 4 mg  4 mg Intravenous Q6H PRN Angelina Norton MD   4 mg at 06/07/21 0113    acetaminophen (TYLENOL) tablet 650 mg  650 mg Oral Q6H PRN Angelina Norton MD        Or   Daly acetaminophen (TYLENOL) suppository 650 mg  650 mg Rectal Q6H PRN Mary Wren MD        bisacodyl (DULCOLAX) suppository 10 mg  10 mg Rectal Daily PRN Mary Wren MD        pantoprazole (PROTONIX) injection 40 mg  40 mg Intravenous Daily Mary Wren MD   40 mg at 06/09/21 0737    heparin (porcine) injection 2,400 Units  2,400 Units Intravenous PRN Mary Wren MD        heparin (porcine) injection 1,200 Units  1,200 Units Intravenous PRN Mary Wren MD   1,200 Units at 06/07/21 0436    heparin 25,000 units in dextrose 5% 250 mL (premix) infusion  570 Units/hr Intravenous Continuous Mary Wren MD   Stopped at 06/09/21 0603    morphine (PF) injection 2 mg  2 mg Intravenous Q4H PRN Mary Wren MD        Or   Abby Morfin morphine (PF) injection 4 mg  4 mg Intravenous Q4H PRN Mary Wren MD        phenol 1.4 % mouth spray 1 spray  1 spray Mouth/Throat Q2H PRN Mary Wren MD           Allergies:     Allergies   Allergen Reactions    Pneumococcal Vaccines        Problem List:    Patient Active Problem List   Diagnosis Code    Low back pain M54.5    Stress bladder incontinence, female N39.3    Seasonal allergies J30.2    Carotid artery disease I77.9    Wears glasses Z97.3    Lung nodules R91.8    Aneurysm of thoracic aorta (HCC) I71.2    Menopausal osteoporosis M81.0    Essential hypertension I10    Coronary artery calcification I25.10, I25.84    Bronchiectasis without complication (HCC) Z11.0    PAF (paroxysmal atrial fibrillation) ([facility]) I48.0    Paroxysmal atrial fibrillation with rapid ventricular response ([facility]) I48.0    Incarcerated right inguinal hernia K40.30    [facility] K56.609    PAD (peripheral artery disease) ([facility]) I73.9    Celiac artery stenosis (HCC) I77.4    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       Past Medical History:        Diagnosis Date    CAD (coronary artery disease)     Cancer [facility] 2016    skin cancer    [facility]otid artery disease [facility]     HTN (hypertension)     Hyperlipemia     Hyperlipidemia     LBP (low back pain)     Lung nodules     Osteoporosis     Seasonal allergies     Stress bladder incontinence, female     Unspecified cerebral artery occlusion with cerebral infarction     Wears glasses        Past Surgical History:        Procedure Laterality Date    CARDIAC CATHETERIZATION      COLONOSCOPY  8/13/2014    VARICOSE VEIN SURGERY         Social History:    Social History     Tobacco Use    Smoking status: Never Smoker    Smokeless tobacco: Never Used   Substance Use Topics    Alcohol use: No                                Counseling given: Not Answered      Vital Signs (Current):   Vitals:    06/09/21 0301 06/09/21 0734 06/09/21 0736 06/09/21 1104   BP: (!) 164/93  (!) 160/93 (!) 179/114   Pulse: 87  110 101   Resp: 18  18 18   Temp: 97.8 °F (36.6 °C) 97.5 °F (36.4 °C) 97.5 °F (36.4 °C) 98.3 °F (36.8 °C)   TempSrc: Oral Oral Oral Oral   SpO2: 95%  95% 94%   Weight: 91 lb 11.4 oz (41.6 kg)      Height:                                                  BP Readings from Last 3 Encounters:   06/09/21 (!) 179/114   02/03/21 130/68   01/04/21 138/84       NPO Status:  MN+, SEE MAR                                                                               BMI:   Wt Readings from Last 3 Encounters:   06/09/21 91 lb 11.4 oz (41.6 kg)   02/03/21 97 lb (44 kg)   01/04/21 98 lb (44.5 kg)     Body mass index is 16.25 kg/m².     CBC:   Lab Results   Component Value Date    WBC 4.6 06/09/2021    RBC 3.95 06/09/2021    RBC 4.29 09/14/2016    HGB 12.4 06/09/2021    HCT 36.7 06/09/2021    MCV 92.9 06/09/2021    RDW 14.2 06/09/2021     06/09/2021       CMP:   Lab Results   Component Value Date     06/09/2021    K 3.8 06/09/2021    CL 99 06/09/2021    CO2 27 06/09/2021    BUN 10 06/09/2021    CREATININE 0.6 06/09/2021    GFRAA >60 06/09/2021    GFRAA >60 04/03/2013    AGRATIO 0.9 06/07/2021    LABGLOM >60 liver disease, no renal disease, bowel prep and no morbid obesity       Endo/Other:    (+) blood dyscrasia: anticoagulation therapy, arthritis:., malignancy/cancer (SKIN). (-) diabetes mellitus, hypothyroidism, hyperthyroidism               Abdominal:       Abdomen: soft. Vascular:   + PVD, aortic or cerebral (MESENTERIC , THORACIC, CAROTID  //  VARICOSE VEINS), . Anesthesia Plan      general     ASA 3       Induction: intravenous. MIPS: Postoperative opioids intended and Prophylactic antiemetics administered. Anesthetic plan and risks discussed with patient and child/children. Plan discussed with CRNA. This pre-anesthesia assessment may be used as a history and physical.    DOS STAFF ADDENDUM:    Pt seen and examined, chart reviewed (including anesthesia, drug and allergy history). No interval changes to history and physical examination. Anesthetic plan, risks, benefits, alternatives, and personnel involved discussed with patient. Patient verbalized an understanding and agrees to proceed.       Namrata Leung MD  June 9, 2021  1:41 PM      Namrata Leung MD   6/9/2021

## 2021-06-09 NOTE — CARE COORDINATION
Chart review day 3: Patient on C3 re SBO followed by IM and General Surgery. Patient with surgery planned for this date. Patient from home [facility] following. Patient with rec for home with PRN and HHC. SW will ct to follow for DCP needs. NEYMAR Carranza

## 2021-06-09 NOTE — PROGRESS NOTES
Pt is a/o x4. VSS. Assessment as charted. - Pt states that she continues to have intermittent nausea but no pain, otherwise feels she has tolerated her clears diet well- is passing flatus, no BM- hypoactive bowel sounds. Pt will remain NPO after midnight in anticipation for procedure and Heparin gtt will be stopped at 0600. - Pt is on tele monitoring running SR/ST.   - Pt does have intermittent anxiety- PRN Ativan given per MD order. Pt is current;y resting in her bed that is locked and in its lowest position w/ her call light within reach, non-skid socks, and bed alarm on. Pt denies any other needs at this time.

## 2021-06-09 NOTE — PROGRESS NOTES
Occupational Therapy  OT follow up attempted. Per the PTA note, the MD canceled orders due to a risk of hernia popping out prior to surgery. When calling RN pt is on hold as well and going to surgery in about an hour.      Vance Cosme, [address] [facility]

## 2021-06-10 NOTE — PROGRESS NOTES
Physical Therapy  Facility/Department: [facility] C3 TELE/MED SURG/ONC  Daily Treatment Note  NAME: Yobani Qureshi  : 1940  MRN: [id number]    Date of Service: 6/10/2021    Discharge Recommendations:  Subacute/Skilled Nursing Facility   PT Equipment Recommendations  Equipment Needed: No  Other: defer to next level of care    Assessment   Body structures, Functions, Activity limitations: Decreased functional mobility ; Decreased balance;Decreased endurance; Increased pain;Decreased posture  Assessment: pt is s/p ROBOTIC RIGHT INGUINAL HERNIA REPAIR (Right Abdomen) this date. Pt needed in creased assist with bed mobility gait and transfers this date. pt will not have 24 h assist at home and is agreeable to SNF placement to work on abovedeficits and to get pt to back to being independent. will continue to progress towards goals. Treatment Diagnosis: Decreased functional mobility  Prognosis: Good  PT Education: Goals;PT Role;Plan of Care; Functional Mobility Training;Gait Training;Transfer Training;Disease Specific Education  Patient Education: Pt verbalized understanding; will benefit from additional education on positioning with RW and hand placement for safety. Barriers to Learning: none  REQUIRES PT FOLLOW UP: Yes  Activity Tolerance  Activity Tolerance: Patient Tolerated treatment well;Patient limited by pain; Patient limited by endurance  Activity Tolerance: /96  O2 96%     Patient Diagnosis(es): The primary encounter diagnosis was Incarcerated right inguinal hernia. Diagnoses of Right lower quadrant abdominal pain and Uncontrolled hypertension were also pertinent to this visit.      has a past medical history of CAD (coronary artery disease), Cancer ([facility] Carotid artery disease ([facility] HTN (hypertension), Hyperlipemia, Hyperlipidemia, LBP (low back pain), Lung nodules, Osteoporosis, Seasonal allergies, Stress bladder incontinence, female, Unspecified cerebral artery occlusion with cerebral infarction, and Wears glasses. has a past surgical history that includes Varicose vein surgery; Cardiac catheterization; Colonoscopy (8/13/2014); and hernia repair (Right, 6/9/2021). Restrictions  Restrictions/Precautions  Restrictions/Precautions: Fall Risk, General Precautions  Position Activity Restriction  Other position/activity restrictions: bed rest with bathroom privliages  Subjective   General  Chart Reviewed: Yes  Additional Pertinent Hx: Per MD: Arnaldo Macias is a 78 y.o. female who presented to the ED to be evaluated for acutely worsening RLQ abdominal pain ongoing for greater than 1 weeks duration. Patient endorses right inguinal \"bulge\" where she reports vascular surgeon Dr. Verónica Suh gained access for mesenteric stent placement on 5/10/2021. \" s/p ROBOTIC RIGHT INGUINAL HERNIA REPAIR (Right Abdomen)  Response To Previous Treatment: Patient with no complaints from previous session. Family / Caregiver Present: No  Referring Practitioner: Iban Dhillon MD  Subjective  Subjective: Pt agreeable to therapy.   General Comment  Comments: RN cleared for PT  Pain Screening  Patient Currently in Pain: Denies  Vital Signs  Patient Currently in Pain: Denies       Orientation     Cognition      Objective   Bed mobility  Supine to Sit: Minimal assistance  Sit to Supine:  (pt left up in chair at end of session)  Transfers  Sit to Stand: Minimal Assistance (from bed and toilet)  Stand to sit: Minimal Assistance  Bed to Chair: Minimal assistance  Ambulation  Ambulation?: Yes  Ambulation 1  Surface: level tile  Device: Rolling Walker  Assistance: Minimal assistance  Quality of Gait: Slow to complete, forward trunk flexion, decreased step length, no LOB  Gait Deviations: Shuffles  Distance: 20 ft + 20 ft     Balance  Standing - Static: Fair;-  Standing - Dynamic: Poor;+  Comments: CGA at sink with 1 UE on sink for support X 2 min to wash hands and face  Exercises  Hip Flexion: 2 X 10 B LE  Knee Long Arc Quad: 2 X 10 B LE  Ankle Pumps: 2 X 10 B LE     AM-PAC Score  AM-PAC Inpatient Mobility Raw Score : 17 (06/10/21 1026)  AM-PAC Inpatient T-Scale Score : 42.13 (06/10/21 1026)  Mobility Inpatient CMS 0-100% Score: 50.57 (06/10/21 1026)  Mobility Inpatient CMS G-Code Modifier : CK (06/10/21 1026)          Goals  Short term goals  Time Frame for Short term goals: 5-7 days, unless otherwise stated, by 6/14/21  Short term goal 1: Pt will complete sit<>stand with RW with independence. . 6/10 not met  Short term goal 2: Pt will ambulate 150 feet with LRAD and independence. ; 6/9 not met 20 ft min A  Short term goal 3: Pt will ascend/descend 2 steps with one rail and independence. ; 6/9 HAILE  Short term goal 4: Pt will complete 15 reps of BLE ther ex for strength and balance by 6/10/21.; 6/10 not met 2X10  Patient Goals   Patient goals : To feel better and go home. Plan    Plan  Times per week: 3-5X/ week  Times per day: Daily  Current Treatment Recommendations: Strengthening, Balance Training, Functional Mobility Training, Transfer Training, Endurance Training, Gait Training, Stair training, Home Exercise Program, Safety Education & Training, Equipment Evaluation, Education, & procurement, Patient/Caregiver Education & Training, Pain Management  Safety Devices  Type of devices: All fall risk precautions in place, Call light within reach, Patient at risk for falls, Left in chair, Nurse notified, Chair alarm in place     Therapy Time   Individual Concurrent Group Co-treatment   Time In 0945         Time Out 1023         Minutes 38         Timed Code Treatment Minutes: 38 Minutes        If pt is unable to be seen after this session, please let this note serve as discharge summary. Please see case management note for discharge disposition. Thank you.       Tremaine Briceno, PT , DPT

## 2021-06-10 NOTE — PROGRESS NOTES
Pt is a/o x4. VSS. Assessment as charted.      - Pt has x4 surg lap ix closed w/ surgical glue- CARLTON. C/D/I. Pt states it is sore but denies any pain medication at this time- ice applied. Pt has active bowel sounds and has tolerated clear fluids like water, tea, and broth. Pt denies any nausea. No flatus or BM since procedure. - Pt is on tele monitoring running SR/ST.   - The following message was sent to Dr. Jamelle Dakin regarding a hep gtt order:  \"Pt is 78 y.o, that was admitted for a SBO r/t R inguinal hernia. She has a hx of PAF as well as celiac artery stenosis w/ stent that is typically maintained on Eliquis 2.5 mg twice daily. She was on a hep gtt d/t her chronic anticoagulation and being NPO. She had a Robotic R inguinal hernia repair today and they stopped the hep gtt prior to her procedure. The order is still standing for her hep gtt and I was just clarifying if you wanted this initiated now, or how long to wait to start it? \"   He responded to hold off on starting it until tomorrow.      Pt is current;y resting in her bed that is locked and in its lowest position w/ her call light within reach, non-skid socks, and bed alarm on. Pt denies any other needs at this time.

## 2021-06-10 NOTE — DISCHARGE INSTR - COC
Continuity of Care Form    Patient Name: Jesús Johnson   :  1940  MRN:  [id number]    Admit date:  2021  Discharge date:  2021    Code Status Order: Full Code   Advance Directives:   [facility] Documentation       Date/Time Healthcare Directive Type of Healthcare Directive Copy in [address] Agent's Name Healthcare Agent's Phone Number    21  Yes, patient has an advance directive for healthcare treatment  Durable power of  for health care;Living will  No, copy requested from family  Healthcare power of   --  --            Admitting Physician:  Gia Albert MD  PCP: NED [place] - CNP    Discharging Nurse: Miguel Bland Unit/Room#: 0321/0321-01  Discharging Unit Phone Number: [phone]    Emergency Contact:   Extended Emergency Contact Information  Primary Emergency Contact: Alyssa Aguilera  Address: [address] [place] [address] Darion Crabtree of [address] [place] [address] Phone: [phone]  Work Phone: [phone]  Relation: Child  Secondary Emergency Contact: Dominick Durham of [address] [place] [address] Phone: [phone]  Mobile Phone: [phone]  Relation: Child    Past Surgical History:  Past Surgical History:   Procedure Laterality Date    CARDIAC CATHETERIZATION      COLONOSCOPY  2014    HERNIA REPAIR Right 2021    ROBOTIC RIGHT INGUINAL HERNIA REPAIR performed by Jerrod Gamboa MD at [address] [place] [address]         Immunization History:   Immunization History   Administered Date(s) Administered    Influenza 10/29/2012    Influenza Virus Vaccine 10/29/2003, 10/05/2005, 2007, 2008, 10/27/2010, 10/27/2010, 10/18/2011, 2011, 2020    Influenza, High Dose (Fluzone 65 yrs and older) 10/29/2014, 2015, 2017    Pneumococcal Conjugate 13-valent (Yhpicyx79) 05/10/2016    Pneumococcal Polysaccharide ([id number]) 11/11/2002    Zoster Live (Zostavax) 02/22/2010       Active Problems:  Patient Active Problem List   Diagnosis Code    Low back pain M54.5    Stress bladder incontinence, female N39.3    Seasonal allergies J30.2    Carotid artery disease I77.9    Wears glasses Z97.3    Lung nodules R91.8    Aneurysm of thoracic aorta ([facility]) I71.2    Menopausal osteoporosis M81.0    Essential hypertension I10    Coronary artery calcification I25.10, I25.84    Bronchiectasis without complication ([facility]) D19.2    PAF (paroxysmal atrial fibrillation) ([facility]) I48.0    Paroxysmal atrial fibrillation with rapid ventricular response ([facility]) I48.0    Incarcerated right inguinal hernia K40.30    SBO (small bowel obstruction) ([facility] K56.609    PAD (peripheral artery disease) ([facility]) I73.9    Celiac artery stenosis ([facility]) I77.4    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       Isolation/Infection:   Isolation            No Isolation          Patient Infection Status       Infection Onset Added Last Indicated Last Indicated By Review Planned Expiration Resolved Resolved By    None active    Resolved    COVID-19 Rule Out 06/09/21 06/09/21 06/09/21 COVID-19, Rapid (Ordered)   06/09/21 Rule-Out Test Resulted            Nurse Assessment:  Last Vital Signs: BP (!) 157/93   Pulse 99   Temp 97.8 °F (36.6 °C) (Oral)   Resp 19   Ht 5' 3\" (1.6 m)   Wt 89 lb 4.8 oz (40.5 kg)   SpO2 95%   BMI 15.82 kg/m²     Last documented pain score (0-10 scale): Pain Level: 0  Last Weight:   Wt Readings from Last 1 Encounters:   06/10/21 89 lb 4.8 oz (40.5 kg)     Mental Status:  oriented, alert, coherent, logical, thought processes intact and able to concentrate and follow conversation    IV Access:  - None    Nursing Mobility/ADLs:  Walking   Assisted  Transfer  Assisted  Bathing  Assisted  Dressing  Assisted  Toileting  Assisted  Feeding  Independent  Med Admin  Assisted  Med Delivery   whole    Wound Care Documentation and Therapy: Elimination:  Continence:   · Bowel: Yes  · Bladder: Yes  Urinary Catheter: None   Colostomy/Ileostomy/Ileal Conduit: No       Date of Last BM: 6/13/21    Intake/Output Summary (Last 24 hours) at 6/10/2021 1530  Last data filed at 6/10/2021 1152  Gross per 24 hour   Intake 1800 ml   Output 410 ml   Net 1390 ml     I/O last 3 completed shifts: In: 1800 [P.O.:1080; I.V.:720]  Out: 410 [Urine:400; Blood:10]    Safety Concerns: At Risk for Falls and Aspiration Risk    Impairments/Disabilities:      Vision    Nutrition Therapy:  Current Nutrition Therapy:   - Oral Diet:  Dental Soft  - Oral Nutrition Supplement:  Standard  three times a day    Routes of Feeding: Oral  Liquids: Thin Liquids  Daily Fluid Restriction: no  Last Modified Barium Swallow with Video (Video Swallowing Test): not done    Treatments at the Time of Hospital Discharge:   Respiratory Treatments:   Oxygen Therapy:  is not on home oxygen therapy.   Ventilator:    - No ventilator support    Rehab Therapies: Physical Therapy, Occupational Therapy and Speech/Language Therapy  Weight Bearing Status/Restrictions: No weight bearing restirctions  Other Medical Equipment (for information only, NOT a DME order):  walker  Other Treatments:     Patient's personal belongings (please select all that are sent with patient):  Glasses    RN SIGNATURE:  Electronically signed by Elza Obregon RN on 6/13/21 at 12:15 PM EDT    CASE MANAGEMENT/SOCIAL WORK SECTION    Inpatient Status Date:06/06/2021    Readmission Risk Assessment Score:  Readmission Risk              Risk of Unplanned Readmission:  16         Discharging to Facility/ Agency   Argenis Vera   [phone]     / signature: Electronically signed by NEYMAR Mcdaniel on 6/10/21 at 3:30 PM EDT    PHYSICIAN SECTION    Prognosis: Good    Condition at Discharge: Stable    Rehab Potential (if transferring to Rehab): Good    Recommended Labs or Other Treatments After Discharge: NA    Physician Certification: I certify the above information and transfer of Ivett Bazan  is necessary for the continuing treatment of the diagnosis listed and that she requires [facility] for less 30 days.      Update Admission H&P: No change in H&P    PHYSICIAN SIGNATURE:  Electronically signed by Belle Agustin MD on 6/12/21 at 11:53 AM EDT

## 2021-06-10 NOTE — PROGRESS NOTES
Occupational Therapy  Facility/Department: [facility] C3 TELE/MED SURG/ONC  Daily Treatment Note  NAME: Smitha Cabello  : 1940  MRN: [id number]    Date of Service: 6/10/2021    Discharge Recommendations:  Subacute/Skilled Nursing Facility     Assessment   Performance deficits / Impairments: Decreased functional mobility ; Decreased safe awareness;Decreased high-level IADLs;Decreased strength;Decreased ADL status; Decreased endurance  Assessment: Pt functioning below her baseline, demos decreased standing endurance, c/o fatigue with bathroom mobility. Pt CGA with RW for functional transfers and mobility with cues for upright posture. Pt motivated and cooperative, demos the above noted occupational performance deficits and would benefit from continued skilled OT in SNF setting at d/c. Prognosis: Good  OT Education: OT Role;Plan of Care;ADL Adaptive Strategies;Transfer Training  REQUIRES OT FOLLOW UP: Yes  Activity Tolerance  Activity Tolerance: Patient limited by fatigue  Safety Devices  Safety Devices in place: Yes  Type of devices: Left in bed;Call light within reach;Nurse notified;Gait belt         Patient Diagnosis(es): The primary encounter diagnosis was Incarcerated right inguinal hernia. Diagnoses of Right lower quadrant abdominal pain and Uncontrolled hypertension were also pertinent to this visit. has a past medical history of CAD (coronary artery disease), Cancer ([address] Carotid artery disease ([address] HTN (hypertension), Hyperlipemia, Hyperlipidemia, LBP (low back pain), Lung nodules, Osteoporosis, Seasonal allergies, Stress bladder incontinence, female, Unspecified cerebral artery occlusion with cerebral infarction, and Wears glasses. has a past surgical history that includes Varicose vein surgery; Cardiac catheterization; Colonoscopy (2014); and hernia repair (Right, 2021).     Restrictions  Restrictions/Precautions  Restrictions/Precautions: Fall Risk, General Precautions  Position Activity Restriction  Other position/activity restrictions: bed rest with bathroom privliages     Subjective   General  Chart Reviewed: Yes  Patient assessed for rehabilitation services?: Yes  Response to previous treatment: Patient with no complaints from previous session  Family / Caregiver Present: No  Referring Practitioner: Dr. Dinesh Dee  Diagnosis: SBO, nausea, generalized weakness    Subjective  Subjective: Pt up in chair, asleep at approach, awakened easily and agreeable to OT treatment. Pain Assessment  Pain Assessment: Faces  Gaines-Baker Pain Rating: Hurts little more  Pain Location: Abdomen  Non-Pharmaceutical Pain Intervention(s): Repositioned; Ambulation/Increased Activity  Pre Treatment Pain Screening  Intervention List: Patient able to continue with treatment    Vital Signs  Pulse: 105  BP: (!) 150/101  Patient Currently in Pain: Yes  Oxygen Therapy  SpO2: 94 %  O2 Device: None (Room air)     Orientation  Orientation  Overall Orientation Status: Within Functional Limits     Objective    ADL  Grooming: Contact guard assistance (in stance at sink for handwashing)  LE Dressing: Stand by assistance (to doff shoes)  Toileting: Supervision     Balance  Sitting Balance: Supervision  Standing Balance: Contact guard assistance (with RW)  Standing Balance  Activity: functional/bathroom mobility with RW    Functional Mobility  Functional - Mobility Device: Rolling Walker  Activity: To/from bathroom  Assist Level: Contact guard assistance    Toilet Transfers  Toilet - Technique: Ambulating (RW)  Equipment Used: Standard toilet (with use of R grab bar)  Toilet Transfer: Contact guard assistance    Bed mobility  Sit to Supine: Contact guard assistance     Transfers  Sit to stand: Contact guard assistance  Stand to sit: Contact guard assistance     Plan   Plan  Times per week: 2-4x/ week  Current Treatment Recommendations: Strengthening, Endurance Training, Balance Training, Functional Mobility Training, Safety Education & Training, Self-Care / ADL    AM-PAC Score  AM-PAC Inpatient Daily Activity Raw Score: 17 (06/10/21 1403)  AM-PAC Inpatient ADL T-Scale Score : 37.26 (06/10/21 1403)  ADL Inpatient CMS 0-100% Score: 50.11 (06/10/21 1403)  ADL Inpatient CMS G-Code Modifier : CK (06/10/21 1403)    Goals  Short term goals  Time Frame for Short term goals: 1 week(6-14-21)  Short term goal 1: supervision with bathroom mobility by 6-14-21-- ongoing 6/10/21  Short term goal 2: supervision standing ADL's for 3 minutes-- ongoing 6/10/21  Short term goal 3: independent with functional/toilet transfers by 6-14-21-- ongoing 6/10/21  Short term goal 4: set up only for UE & LE self care by 6-12-21-- ongoing 6/10/21  Patient Goals   Patient goals : home and resume my normal activity after my surgery       Therapy Time   Individual Concurrent Group Co-treatment   Time In 1330         Time Out 1355         Minutes [address]SANGITA

## 2021-06-11 NOTE — OP NOTE
[facility]                                OPERATIVE REPORT    PATIENT NAME: Cb Mcclelland                  :        1940  MED REC NO:   [id number]                          ROOM:       0321  ACCOUNT NO:   [de-identified]                           ADMIT DATE: 2021  PROVIDER:     Edmond Navas MD    DATE OF PROCEDURE:  2021    PREOPERATIVE DIAGNOSIS:  Right inguinal hernia. POSTOPERATIVE DIAGNOSIS:  Right femoral hernia. PROCEDURE PERFORMED:  Robotic right femoral hernia repair with mesh. SURGEON:  Edmond Navas MD    ANESTHESIA:  General.    ESTIMATED BLOOD LOSS:  Less than 50 mL. SPECIMEN:  None. COUNTS:  Sponge and needle count correct. INDICATIONS:  The patient is an 75-year-old female who presented several  days prior with an acutely incarcerated right groin hernia. It was  actually able to be reduced resolving her associated small bowel  obstruction, but with her needing to be on IV anticoagulation. We have  offered to fix her hernia during this admission allowing her to then  resume her regular anticoagulation protocol. FINDINGS:  1.  Moderate size femoral hernia defect without incarcerated contents. 2.  Developing right obturator canal defect. 3.  Repair and coverage of both defects with a large 3DMax mesh. DESCRIPTION OF PROCEDURE:  After informed consent was obtained, the  patient was taken to the operating room and placed in supine position. Preoperative antibiotics had been initiated in the holding area. Athrombic pumps were placed on the legs. General anesthesia was  administered without difficulty. We began with a trocar incision in the  left lateral abdomen after infiltrating with local anesthesia. The  trocar was inserted under direct vision into the very thin abdominal  wall into the peritoneal cavity. Insufflation was initiated.   A  contralateral right-sided trocar was placed in a similar fashion  followed by a 85-QC supraumbilical midline trocar. The bed was  positioned to expose the pelvis sufficiently and the robot docked. There was a clearly visible defect in the inguinal floor region that was  clearly medial to the epigastric vessels suggesting this was either a  direct inguinal or femoral hernia. Beginning in the abdominal wall  above the inguinal region, the peritoneal flap was opened in a standard  fashion in a transverse direction. The fascia was opened widely and  carried down across the entire inguinal region and first approaching the  pubic ramus and pubic tubercle medially. These were cleared off nicely  and down well below the pubic ramus into the space of Retzius. In doing  this maneuver, I noted that there was significant dimple at the  obturator canal indicative of developing obturator hernia. There were  no contents of course in this at this time. The defect that had been  noted at the beginning of the case was clearly a femoral canal defect  rather than a direct hernia defect, but there was only some moderate  degree of incarcerated preperitoneal fat involved. This was easily  reduced out of the defect. There was no indirect defect and the  peritoneal flap was dissected well down off of the entire inguinal  region down onto the psoas muscles. Of note, I did proceed to mobilize  and transect the round ligament approximately 1 cm from the internal  inguinal ring to aid in the placement of mesh. When full dissection was accomplished, a large 3DMax mesh was chosen due  to the patient's very thin, small body habitus. The mesh was positioned  appropriately into the preperitoneal space and secured to the pubic  ramus with 2-0 Vicryl sutures. With the mesh fully secured, the  peritoneal flap was closed with a running 3-0 Stratafix suture. At this  point, the operation was complete.   The robot was undocked and the  trocars were removed. The supraumbilical fascial defect was closed with  a figure-of-eight 0 Vicryl suture. The skin incisions were closed with  4-0 Monocryl subcuticular sutures and Dermabond. The patient was then  extubated and taken to the recovery room in stable condition.         Lei Degroot MD    D: 06/10/2021 20:46:51       T: 06/10/2021 23:12:13     [id number]  Job#: [id number]     Doc#: [id number]    CC:  <>

## 2021-06-11 NOTE — PROGRESS NOTES
Provided pt with IS and spent about 6 minutes teaching. Pt demonstrated correct deep breathing with IS. Encouraged pt to deep breath in through nose and out through mouth when not using IS to try to be more deliberate about breathing and keep her calm.

## 2021-06-11 NOTE — PLAN OF CARE
Nutrition Problem #1: Severe malnutrition  Intervention: Food and/or Nutrient Delivery: Continue Current Diet, Start Oral Nutrition Supplement  Nutritional Goals: Consume 50% or greater of 3 meals per day and ONS

## 2021-06-11 NOTE — PROGRESS NOTES
Weaned pt off dilt gtt and pt VSS. Pt denies any CP, sob, or feeling of flutters. Will continue to monitor on tele. Pt stable at this time.

## 2021-06-11 NOTE — PROGRESS NOTES
Comprehensive Nutrition Assessment    Type and Reason for Visit:  Reassess    Nutrition Recommendations/Plan:   1. Continue current easy to chew diet and encourage PO intake  2. RD to order ensure QID and magic cup BID  3. Monitor nutrition adequacy, pertinent labs, bowel habits, wt changes, and clinical progress    Nutrition Assessment:  Follow up: status post robotic inguinal hernia repair. Advanced to regular easy to chew diet on 6/9. PO intakes remain poor. NGT removed. Pt reports good appetite, consuming 25-50% of meal trays. Reports that she will attempt to eat banana. No N/V reported. Pt willing to trial ONS- RD to add vanilla magic cup BID and ensure QID. Will continue to monitor. Malnutrition Assessment:  Malnutrition Status:  Severe malnutrition    Context:  Acute Illness     Findings of the 6 clinical characteristics of malnutrition:  Energy Intake:  7 - 50% or less of estimated energy requirements for 5 or more days  Weight Loss:  7 - Greater than 7.5% over 3 months     Body Fat Loss:  1 - Mild body fat loss     Muscle Mass Loss:  1 - Mild muscle mass loss      Estimated Daily Nutrient Needs:  Energy (kcal):  2408-3338 kcals/day; Weight Used for Energy Requirements:  Ideal (52 kg)     Protein (g):  62-73 g/day; Weight Used for Protein Requirements:  Ideal (1.2-1.4 g/kg)        Fluid (ml/day):  1600 ml; Method Used for Fluid Requirements:  1 ml/kcal      Nutrition Related Findings:  Lower paritals, difficulty chewing. No BM yet. Folic acid.       Wounds:  None       Current Nutrition Therapies:    ADULT DIET; Easy to Chew  Adult Oral Nutrition Supplement; Standard High Calorie/High Protein Oral Supplement  Adult Oral Nutrition Supplement; Frozen Oral Supplement    Anthropometric Measures:  · Height: 5' 3\" (160 cm)  · Current Body Weight: 89 lb (40.4 kg)   · Ideal Body Weight: 115 lbs; % Ideal Body Weight 77.4 %   · BMI: 15.8  · BMI Categories: Underweight (BMI less than 22) age over 72 Nutrition Diagnosis:   · Severe malnutrition related to altered GI function as evidenced by intake 0-25%, BMI, poor intake prior to admission    Nutrition Interventions:   Food and/or Nutrient Delivery:  Continue Current Diet, Start Oral Nutrition Supplement  Nutrition Education/Counseling:  No recommendation at this time   Coordination of Nutrition Care:  Continue to monitor while inpatient    Goals:  Consume 50% or greater of 3 meals per day and ONS       Nutrition Monitoring and Evaluation:   Behavioral-Environmental Outcomes:  None Identified   Food/Nutrient Intake Outcomes:  Supplement Intake, Food and Nutrient Intake, Diet Advancement/Tolerance  Physical Signs/Symptoms Outcomes:  Skin, Weight, Nutrition Focused Physical Findings     Discharge Planning:    Continue current diet, Continue Oral Nutrition Supplement     Electronically signed by Vera Espitia MS, RD, LD on 6/11/21 at 12:57 PM EDT    Contact: Office: [phone]; [address]: [phone]

## 2021-06-11 NOTE — CONSULTS
CARDIOLOGY CONSULTATION        Patient Name: Sharlene Rosas  Date of admission: 6/6/2021 12:03 PM  Admission Dx: SBO (small bowel obstruction) ([facility] [K56.609]  Requesting Physician: Julito Newman MD  Primary Care physician: NED Shine CNP    Reason for Consultation/Chief Complaint: Atrial fibrillation     History of Present Illness:     Sharlene Rosas is a 78 y.o. patient with prior history notable for atrial fibrillation, chronic oral anticoagulation, cardiomyopathy with EF 35 to 40% per echo 10/2020, mild aortic regurgitation, mild ascending aortic enlargement, prior mesenteric celiac stent placement 5/10/2021 with Dr. Sury Robbins, who presented to the hospital 6/6/2021 for acutely worsening right lower quadrant abdominal pain ongoing for 1 week duration. She was noted seen by general surgery with concerns for possible incarcerated inguinal hernia. Mated for small bowel obstruction with plans for surgery. Patient underwent recent right hernia repair 6/9. Postoperative course complicated with atrial fibrillation rapid ventricular response for which cardiology is consulted. Given metoprolol IV as well as amiodarone bolus this a.m. Started on diltiazem drip that was titrated to 20 mg/h. Oral metoprolol  mg daily at home dosing was restarted. Rates had gone up to 180s at which time she was symptomatic with palpitations per nursing report. Recent decline in rates now 70s to 80s. Patient reports no present palpitations, dizziness. The patient denies chest pain, shortness of breath at rest and dyspnea with exertion. Denies paroxysmal nocturnal dyspnea, orthopnea, bendopnea, increasing lower extremity edema or weight gain. The patient endorses highest level of activity as doing stretching exercises for an hour in the morning as well as light weights continuing up until a couple weeks ago when she did feel up to it.   Denies any limiting chest discomfort or dyspnea that stopped her from working out. Past Medical History:   has a past medical history of CAD (coronary artery disease), Cancer ([facility] Carotid artery disease ([facility] HTN (hypertension), Hyperlipemia, Hyperlipidemia, LBP (low back pain), Lung nodules, Osteoporosis, Seasonal allergies, Stress bladder incontinence, female, Unspecified cerebral artery occlusion with cerebral infarction, and Wears glasses. Surgical History:   has a past surgical history that includes Varicose vein surgery; Cardiac catheterization; Colonoscopy (8/13/2014); and hernia repair (Right, 6/9/2021). Social History:   reports that she has never smoked. She has never used smokeless tobacco. She reports that she does not drink alcohol and does not use drugs. Family History:  family history includes Heart Disease in her sister; Stroke in her mother. Home Medications:  Were reviewed and are listed in nursing record and/or below  Prior to Admission medications    Medication Sig Start Date End Date Taking? Authorizing Provider   clopidogrel (PLAVIX) 75 MG tablet Take 75 mg by mouth daily   Yes Historical Provider, MD   simvastatin (ZOCOR) 20 MG tablet TAKE ONE TABLET BY MOUTH DAILY 1/4/21  Yes NED Dodd - CNP   metoprolol succinate (TOPROL XL) 100 MG extended release tablet Take 1 tablet by mouth daily 1/4/21  Yes NED Manuel CNP   spironolactone (ALDACTONE) 25 MG tablet Take 25 mg by mouth daily 7/14/20  Yes Historical Provider, MD   apixaban (ELIQUIS) 2.5 MG TABS tablet Take 1 tablet by mouth 2 times daily 7/8/20  Yes Acosta Casper MD   acetaminophen (TYLENOL) 500 MG tablet Take 500 mg by mouth every 6 hours as needed for Pain   Yes Historical Provider, MD   magnesium (MAGNESIUM-OXIDE) 250 MG TABS tablet Take 250 mg by mouth daily. Yes Historical Provider, MD   Coenzyme Q10 (COQ10) 50 MG CAPS Take  by mouth 2 times daily.    Yes Historical Provider, MD   vitamin D 25 MCG (1000 UT) CAPS Take 1,000 Units by mouth daily    Yes Historical Provider, MD   vitamin E 400 UNIT capsule Take 400 Units by mouth daily. Yes Historical Provider, MD   Calcium Citrate-Vitamin D 200-250 MG-UNIT TABS Take  by mouth. Yes Historical Provider, MD   Ascorbic Acid (VITAMIN C) 500 MG tablet Take 1,000 mg by mouth daily. Yes Historical Provider, MD   folic acid (FOLVITE) 1 MG tablet Take 400 mcg by mouth daily    Yes Historical Provider, MD   Zinc 50 MG CAPS Take  by mouth. Yes Historical Provider, MD   Meals on Wheels MISC by Does not apply route 6/2/21   NED Restrepo - CNP   azithromycin (ZITHROMAX) 250 MG tablet Take 250 mg by mouth See Admin Instructions 1/25/21   Historical Provider, MD   cephALEXin (KEFLEX) 500 MG capsule Take 500 mg by mouth daily    Historical Provider, MD   glucosamine-chondroitin 500-400 MG tablet Take by mouth 2 times daily     Historical Provider, MD   aspirin 81 MG chewable tablet Take 325 mg by mouth daily     Historical Provider, MD   therapeutic multivitamin-minerals (THERAGRAN-M) tablet Take 1 tablet by mouth daily.       Historical Provider, MD        CURRENT Medications:  metoprolol (LOPRESSOR) 5 MG/5ML injection,   metoprolol (LOPRESSOR) 5 MG/5ML injection,   apixaban (ELIQUIS) tablet 2.5 mg, BID  folic acid (FOLVITE) tablet 500 mcg, Daily  metoprolol succinate (TOPROL XL) extended release tablet 100 mg, Daily  dilTIAZem 125 mg in dextrose 5 % 125 mL infusion, Continuous  perflutren lipid microspheres (DEFINITY) injection 1.65 mg, ONCE PRN  LORazepam (ATIVAN) injection 0.25 mg, Q6H PRN  sodium chloride flush 0.9 % injection 10 mL, 2 times per day  sodium chloride flush 0.9 % injection 10 mL, PRN  0.9 % sodium chloride infusion, PRN  potassium chloride (KLOR-CON M) extended release tablet 40 mEq, PRN   Or  potassium bicarb-citric acid (EFFER-K) effervescent tablet 40 mEq, PRN   Or  potassium chloride 10 mEq/100 mL IVPB (Peripheral Line), PRN  magnesium sulfate 2000 mg in 50 mL IVPB premix, PRN  promethazine (PHENERGAN) tablet 12.5 mg, Q6H PRN   Or  ondansetron (ZOFRAN) injection 4 mg, Q6H PRN  acetaminophen (TYLENOL) tablet 650 mg, Q6H PRN   Or  acetaminophen (TYLENOL) suppository 650 mg, Q6H PRN  bisacodyl (DULCOLAX) suppository 10 mg, Daily PRN  pantoprazole (PROTONIX) injection 40 mg, Daily  morphine (PF) injection 2 mg, Q4H PRN   Or  morphine (PF) injection 4 mg, Q4H PRN  phenol 1.4 % mouth spray 1 spray, Q2H PRN        Allergies:  Pneumococcal vaccines     Review of Systems:   A 14 point review of symptoms completed. Pertinent positives identified in the HPI, all other review of symptoms negative as below. Objective:     Vitals:    06/11/21 1645 06/11/21 1654 06/11/21 1658 06/11/21 1703   BP: 115/71 133/85 (!) 137/90 121/87   Pulse:    82   Resp:       Temp:       TempSrc:       SpO2:       Weight:       Height:          Weight: 89 lb 1.6 oz (40.4 kg)       PHYSICAL EXAM:    General:   Emaciated appearing, elderly woman in no acute distress. Head:  Normocephalic, atraumatic   Eyes:  Conjunctiva/corneas clear, anicteric sclerae    Nose: Nares normal, no drainage or sinus tenderness   Throat: No abnormalities of the lips, oral mucosa or tongue. Neck: Trachea midline. Neck supple with no lymphadenopathy, thyroid not enlarged, symmetric, no tenderness/mass/nodules, flat neck veins   Lungs:    Mild rales at the bases, no respiratory distress, on room air   Chest Wall:  No deformity or tenderness to palpation   Heart:   Irregularly irregular, rate controlled, variable S1, normal S2, 2/6 systolic ejection murmur right sternal border without radiation, no rub, no S3/S4, PMI non-palpable, positive RV heave. Abdomen:   Soft, non-tender, with normoactive bowel sounds. No masses, no hepatosplenomegaly   Extremities: No cyanosis, clubbing or pitting edema. Vascular: 2+ radial, femoral, dorsalis pedis and posterior tibial pulses bilaterally. Brisk carotid upstrokes without carotid bruit. left atrium appears visually enlarged. Mild aortic regurgitation, eccentric. Mild mitral and tricuspid regurgitation. Systolic pulmonic artery pressure (SPAP) is normal estimated at 27 mmHg   (Right atrial pressure of 3 mmHg). No prior comparisons. Echo: 10/2020  Study Conclusions     - Left ventricle: The cavity size is normal. Wall thickness is     normal. Systolic function was moderately reduced. The calculated     ejection fraction was in the range of 35% to 40%. Diffuse     hypokinesis. Features are consistent with a pseudonormal left     ventricular filling pattern, with concomitant abnormal relaxation     and increased filling pressure (grade 2 diastolic dysfunction).   The stroke volume is 45ml. The stroke index is 31ml/m^2. - Aortic valve: There was mild regurgitation.   - Aorta: The ascending aorta internal dimension in the A-P     direction, maximal systolic dimension is 5.4IT. - Left atrium: The atrium is moderately dilated. - Inferior vena cava: The vessel was normal in size; the     respirophasic diameter changes were in the normal range (&gt;= 50%);     findings are consistent with normal central venous pressure. Stress Test: 7/2020   Summary    Moderately reduced LVEF 41%    Apical/septal hypokinesis    Fixed apical/septal/inferior defects suggestive of scar    No ischemia            Additional studies:   Prox SMA to Mid SMA lesion is 65% stenosed. · Ost CTA to Prox CTA lesion is 99% stenosed. · Ost CTA to Prox CTA reduced to 0% stenosed. · The lesion was treated with a standard balloon. · The lesion was treated with a standard balloon. · The lesion was treated with a standard balloon. · Ost R RA to Prox R RA lesion is 95% stenosed. Impression:   1. Severe stenosis proximal celiac artery successfully treated with   balloon angioplasty and stent deployment x1     2. Borderline stenosis proximal to mid superior mesenteric artery     3.  No evidence of

## 2021-06-11 NOTE — PROGRESS NOTES
Occupational Therapy  OT follow up attempted, pt HR in 150s at rest and not appropriate for OT treatment at this time. Will continue to follow per POC.   Unk Levels, ENNIS/L

## 2021-06-11 NOTE — PROGRESS NOTES
PS Lyle Butterfield, \"Pt HR is still 140s-150s, occasionally will go to 120s but does not stay there long. All other VSS. Gave scheduled metoprolol around 0930. Any additional orders? \"

## 2021-06-11 NOTE — CARE COORDINATION
Chart review day 5 POD 2: Patient on C3 re SBO followed by IM and General surgery. Patient with diet upgrade, monitoring heart rate. Patient with therapy recs for SNF, accept at [facility] will need rapid COVID, no pre cert needs, Hens compelted. SW will ct to follow. NEYMAR Rai

## 2021-06-11 NOTE — PROGRESS NOTES
potassium chloride **OR** potassium alternative oral replacement **OR** potassium chloride, magnesium sulfate, promethazine **OR** ondansetron, acetaminophen **OR** acetaminophen, bisacodyl, heparin (porcine), heparin (porcine), morphine **OR** morphine, phenol      Intake/Output Summary (Last 24 hours) at 6/11/2021 0751  Last data filed at 6/11/2021 0408  Gross per 24 hour   Intake 860 ml   Output 500 ml   Net 360 ml       Physical Exam Performed:    /75   Pulse 124   Temp 98.6 °F (37 °C) (Oral)   Resp 18   Ht 5' 3\" (1.6 m)   Wt 89 lb 1.6 oz (40.4 kg)   SpO2 93%   BMI 15.78 kg/m²     General appearance: No apparent distress, appears stated age and cooperative. Anxious  HEENT: Pupils equal, round, and reactive to light. Conjunctivae/corneas clear. Neck: Supple, with full range of motion. No jugular venous distention. Trachea midline. NG in place  Respiratory:  Normal respiratory effort. Clear to auscultation, bilaterally without Rales/Wheezes/Rhonchi. Cardiovascular: Regular rate and rhythm with normal S1/S2 without murmurs, rubs or gallops. Abdomen: Soft, tender, non-distended, + BS   Musculoskeletal: No clubbing, cyanosis or edema bilaterally. Full range of motion without deformity. Skin: Skin color, texture, turgor normal.  No rashes or lesions. Neurologic:  Neurovascularly intact without any focal sensory/motor deficits.  Cranial nerves: II-XII intact, grossly non-focal.  Psychiatric: Alert and oriented, thought content appropriate, normal insight  Capillary Refill: Brisk,3 seconds, normal   Peripheral Pulses: +2 palpable, equal bilaterally       Labs:   Recent Labs     06/08/21  1009 06/09/21  0504 06/10/21  0435   WBC 5.1 4.6 7.3   HGB 12.2 12.4 13.2   HCT 35.8* 36.7 39.8    189 235     Recent Labs     06/08/21  1009 06/09/21  0504 06/10/21  0435    136 137   K 3.8 3.8 4.5    99 97*   CO2 29 27 25   BUN 10 10 23*   CREATININE 0.7 0.6 1.1   CALCIUM 9.8 10.1 10.4     No results for input(s): AST, ALT, BILIDIR, BILITOT, ALKPHOS in the last 72 hours. Recent Labs     06/09/21  0507   INR 1.15*     No results for input(s): Tiffanie Lemme in the last 72 hours. Urinalysis:      Lab Results   Component Value Date    NITRU Negative 06/06/2021    WBCUA 0-2 06/06/2021    BACTERIA 4+ 07/07/2020    RBCUA 0-2 06/06/2021    BLOODU Negative 06/06/2021    SPECGRAV 1.010 06/06/2021    GLUCOSEU Negative 06/06/2021       Radiology:  XR CHEST PORTABLE   Final Result   Diffuse bilateral opacity can reflect pulmonary edema or atypical infection. A component of chronic lung disease may also be contributory. Small right   pleural effusion. Questionable pneumoperitoneum. If patient has not had recent intra-abdominal   surgery, dedicated abdominal radiographic series is suggested for further   evaluation. Soft tissue emphysema about the shoulders bilaterally. Findings were called by the radiology call center. XR ACUTE ABD SERIES CHEST 1 VW   Final Result   Malposition of nasogastric tube as above, the tube should be advanced. The findings were sent to the Radiology Results [id number] at 8:43   am on 6/7/2021to be communicated to a licensed caregiver. XR ABDOMEN (KUB) (SINGLE AP VIEW)   Final Result   Enteric catheter side port just beyond the GE junction with the tip in the   proximal gastric body. XR ABDOMEN (KUB) (SINGLE AP VIEW)   Final Result   Unremarkable limited KUB. NG tube tip projects at the left upper quadrant, overlying the expected   location of the stomach. Side port of the NG tube projects at the distal   esophagus esophagogastric junction level. Consider dancing mid NG tube   several cm such that the side port is beyond esophagogastric junction. CTA ABDOMINAL AORTA W BILAT RUNOFF W CONTRAST   Final Result   No pseudoaneurysm is identified in the groins.       There is a right inguinal hernia with small bowel content, possibly   incarcerated segment. There is mild dilation of the upstream small bowel,   consistent with evolving small bowel obstruction. Small amount of free fluid   is also noted. Mesenterorenal arterial disease. Celiac trunk stent placed in interval is   widely patent. Mild proximal SMA stenosis stable. Severe right and mild   left proximal renal artery stenoses stable. Aortoiliac vessels are widely patent. Femoropopliteal segments are patent, with mild, variable stenoses. Right anterior tibial and left peroneal one- vessel runoff. Small bilateral pleural effusions and bibasilar atelectasis.                  Assessment/Plan:    Active Hospital Problems    Diagnosis     Incarcerated right inguinal hernia [K40.30]     SBO (small bowel obstruction) ([facility] [address] [K56.609]     PAD (peripheral artery disease) ([facility]) [I73.9]     Celiac artery stenosis ([facility]) [I77.4]     Malignant hypertensive urgency [I16.0]     PAF (paroxysmal atrial fibrillation) ([facility]) [I48.0]     Aneurysm of thoracic aorta ([facility] [address] [I71.2]      Malignant hypertensive urgency with troponin leak  - POA with -170's 100's, initial trop was 0.02, trended out 0.01> 0.01  -EKG obtained in ED;without evidence of LAD or acute ischemia  -Home medications placed on hold due to strict n.p.o. status  -IV hydralazine scheduled PRN (with parameters) to address extreme BP elevation  -ECHO scheduled to further assess cardiac structure and function  - suspect some anxiety and pain component  - improved but remains higher than would like     Inguinal hernia with SBO  -Consultation placed to surgery with plans for conservative management overnight  -NG tube in place, and patient remains strict n.p.o., improving NG out trialing clears   -Serial lactate levels to be trended  -IV morphine available sparingly for pain control as needed  -Due to celiac stent as well as PAF, anticoagulation unable to be discontinued abruptly; patient initiated on heparin GTT bridge  - 6/9 underwent right hernia repair      Unintentional weight loss with cachexia  -Etiology unclear at this time; concern for underlying occult process/malignancy  -TSH/ free T4, vitamin D, B12/folate, prealbumin and A1c lab work ordered - WNL  -ECHO scheduled to further assess cardiac function  -PT/OT consultation placed for evaluation and treatment  -Consultation placed to GI for further evaluation of malabsorption/anorexia     Chronic anticoagulation  -Patient with history of PAF as well as celiac artery stenosis typically maintained on Eliquis 2.5 mg twice daily  -Due to n.p.o. status, patient was initiated on low-dose weight-based heparin drip (without initial bolus) for bridging therapy   - can resume Oral when OK with surgery   - 6/11- AF with RVR given iv metoprolol and amiodarone bolus.  THis am only some mild improvement in rate, resume PO home BB and eliquis, check labs and give 500 NS bolus     Underlying anxiety: family states that she is an anxious person has been benefiting   - has benefited from prn low dose ativan   - pt would likely from oral antianxiety such as Remeron     DVT Prophylaxis: low-dose weight-based heparin drip initiated  Diet: ADULT DIET; Easy to Chew  Code Status: Full Code    PT/OT Eval Status: ordered     Dispo - pending clinical course     NED Salazar - CNP

## 2021-06-11 NOTE — PROGRESS NOTES
[facility] GENERAL SURGERY    Surgery Progress Note           POD # 2    PATIENT NAME: Reno Mann     TODAY'S DATE: 6/11/2021    INTERVAL HISTORY:    Pt  Was feeling well until early this AM, c/o back pain, and noted to be in A fib/RVR. Denies abdominal pain, N/V. Passed flatus last night but no BM. Has been eating ok. OBJECTIVE:   VITALS:  /75   Pulse 124   Temp 98.6 °F (37 °C) (Oral)   Resp 18   Ht 5' 3\" (1.6 m)   Wt 89 lb 1.6 oz (40.4 kg)   SpO2 93%   BMI 15.78 kg/m²     INTAKE/OUTPUT:    I/O last 3 completed shifts: In: 18 [P.O.:840; I.V.:20]  Out: 500 [Urine:500]  No intake/output data recorded. CONSTITUTIONAL:  fatigued and alert  LUNGS:  clear to auscultation  ABDOMEN:   normal bowel sounds, soft, non-distended, non-tender   INCISION: clean, dry, no drainage, healing    Data:  CBC:   Recent Labs     06/08/21  1009 06/09/21  0504 06/10/21  0435   WBC 5.1 4.6 7.3   HGB 12.2 12.4 13.2   HCT 35.8* 36.7 39.8    189 235     BMP:    Recent Labs     06/08/21  1009 06/09/21  0504 06/10/21  0435    136 137   K 3.8 3.8 4.5    99 97*   CO2 29 27 25   BUN 10 10 23*   CREATININE 0.7 0.6 1.1   GLUCOSE 97 82 141*     Hepatic: No results for input(s): AST, ALT, ALB, BILITOT, ALKPHOS in the last 72 hours. Mag:    No results for input(s): MG in the last 72 hours. Phos:   No results for input(s): PHOS in the last 72 hours. INR:   Recent Labs     06/09/21  0507   INR 1.15*         Radiology Review:       ASSESSMENT AND PLAN:  78 y.o. female status post robotic R femoral hernia repair w/ mesh, w/ postop A fib.    - CV - rate control management per Hospitalist, Gerardo Burch 1137 Cardiology referral?   - GI - cont diet as tolerated   - will follow for now         Electronically signed by Jade Novak MD

## 2021-06-12 NOTE — PROGRESS NOTES
Hospitalist Progress Note      PCP: NED Patton - CNP    Date of Admission: 6/6/2021    Chief Complaint:   Abdominal Pain        stent placed in right groin in may, not sure if that is causing her to have abd pain now. Hospital Course: Esme Mcfadden is a 78 y.o. female who presented to the ED to be evaluated for acutely worsening RLQ abdominal pain ongoing for greater than 1 weeks duration. Patient endorses right inguinal \"bulge\" where she reports vascular surgeon Dr. Ramses Pearce gained access for mesenteric stent placement on 5/10/2021. Upon arrival to the ED, CTA of the abdominal aorta was performed and notable for right inguinal hernia with possible incarcerated small bowel's content; consistent with evolving SBO. Further mention was made of mesenterorenal arterial disease with patent proximal celiac stent. Labs were obtained revealing mild lipase elevation at 91 as well as small troponin elevation 0.02 possibly due to malignant hypertensive urgency presentation. EKG revealed  left axis deviation, LBBB, and rhythm aberrancy. Hospitalist team consulted to admit this patient for early small bowel obstruction with concomitant malignant hypertensive urgency/troponin leak. Subjective: EMR notes reviewed patient seen and examined. No complaints, denies chest pain shortness of breath. Tolerating p.o. well positive bowel sounds heart rate is now controlled but still in A. Fib.   Has been weaned off dill drip    Medications:  Reviewed    Infusion Medications    dilTIAZem Stopped (06/11/21 8156)    sodium chloride       Scheduled Medications    apixaban  2.5 mg Oral BID    folic acid  189 mcg Oral Daily    metoprolol succinate  100 mg Oral Daily    atorvastatin  20 mg Oral Nightly    metoprolol succinate  25 mg Oral Nightly    sodium chloride flush  10 mL Intravenous 2 times per day    pantoprazole  40 mg Intravenous Daily     PRN Meds: perflutren lipid microspheres, LORazepam, sodium chloride flush, sodium chloride, potassium chloride **OR** potassium alternative oral replacement **OR** potassium chloride, magnesium sulfate, promethazine **OR** ondansetron, acetaminophen **OR** acetaminophen, bisacodyl, morphine **OR** morphine, phenol      Intake/Output Summary (Last 24 hours) at 6/12/2021 1200  Last data filed at 6/12/2021 7202  Gross per 24 hour   Intake 845.48 ml   Output 1075 ml   Net -229.52 ml       Physical Exam Performed:    BP (!) 159/90   Pulse 86   Temp 98.1 °F (36.7 °C) (Oral)   Resp 17   Ht 5' 3\" (1.6 m)   Wt 90 lb 14.4 oz (41.2 kg)   SpO2 95%   BMI 16.10 kg/m²     General appearance: No apparent distress, appears stated age and cooperative. Anxious  HEENT: Pupils equal, round, and reactive to light. Conjunctivae/corneas clear. Neck: Supple, with full range of motion. No jugular venous distention. Trachea midline. NG in place  Respiratory:  Normal respiratory effort. Clear to auscultation, bilaterally without Rales/Wheezes/Rhonchi. Cardiovascular: Regular rate and rhythm with normal S1/S2 without murmurs, rubs or gallops. Abdomen: Soft, tender, non-distended, + BS   Musculoskeletal: No clubbing, cyanosis or edema bilaterally. Full range of motion without deformity. Skin: Skin color, texture, turgor normal.  No rashes or lesions. Neurologic:  Neurovascularly intact without any focal sensory/motor deficits.  Cranial nerves: II-XII intact, grossly non-focal.  Psychiatric: Alert and oriented, thought content appropriate, normal insight  Capillary Refill: Brisk,3 seconds, normal   Peripheral Pulses: +2 palpable, equal bilaterally       Labs:   Recent Labs     06/10/21  0435 06/11/21  0700 06/12/21  0610   WBC 7.3 5.8 5.8   HGB 13.2 12.2 11.7*   HCT 39.8 35.7* 34.7*    217 200     Recent Labs     06/10/21  0435 06/11/21  0700 06/12/21  0610    140 139   K 4.5 4.1 3.6   CL 97* 102 101   CO2 25 29 30   BUN 23* 23* 26*   CREATININE 1.1 0.9 0.8   CALCIUM 10.4 10.4 9.7     No results for input(s): AST, ALT, BILIDIR, BILITOT, ALKPHOS in the last 72 hours. No results for input(s): INR in the last 72 hours. Recent Labs     06/11/21  0700 06/11/21  1250 06/11/21  1856   TROPONINI 0.03* 0.06* 0.08*       Urinalysis:      Lab Results   Component Value Date    NITRU Negative 06/06/2021    WBCUA 0-2 06/06/2021    BACTERIA 4+ 07/07/2020    RBCUA 0-2 06/06/2021    BLOODU Negative 06/06/2021    SPECGRAV 1.010 06/06/2021    GLUCOSEU Negative 06/06/2021       Radiology:  XR CHEST PORTABLE   Final Result   Diffuse bilateral opacity can reflect pulmonary edema or atypical infection. A component of chronic lung disease may also be contributory. Small right   pleural effusion. Questionable pneumoperitoneum. If patient has not had recent intra-abdominal   surgery, dedicated abdominal radiographic series is suggested for further   evaluation. Soft tissue emphysema about the shoulders bilaterally. Findings were called by the radiology call center. XR ACUTE ABD SERIES CHEST 1 VW   Final Result   Malposition of nasogastric tube as above, the tube should be advanced. The findings were sent to the Radiology Results [id number] at 8:43   am on 6/7/2021to be communicated to a licensed caregiver. XR ABDOMEN (KUB) (SINGLE AP VIEW)   Final Result   Enteric catheter side port just beyond the GE junction with the tip in the   proximal gastric body. XR ABDOMEN (KUB) (SINGLE AP VIEW)   Final Result   Unremarkable limited KUB. NG tube tip projects at the left upper quadrant, overlying the expected   location of the stomach. Side port of the NG tube projects at the distal   esophagus esophagogastric junction level. Consider dancing mid NG tube   several cm such that the side port is beyond esophagogastric junction. CTA ABDOMINAL AORTA W BILAT RUNOFF W CONTRAST   Final Result   No pseudoaneurysm is identified in the groins.       There is a right inguinal hernia with small bowel content, possibly   incarcerated segment. There is mild dilation of the upstream small bowel,   consistent with evolving small bowel obstruction. Small amount of free fluid   is also noted. Mesenterorenal arterial disease. Celiac trunk stent placed in interval is   widely patent. Mild proximal SMA stenosis stable. Severe right and mild   left proximal renal artery stenoses stable. Aortoiliac vessels are widely patent. Femoropopliteal segments are patent, with mild, variable stenoses. Right anterior tibial and left peroneal one- vessel runoff. Small bilateral pleural effusions and bibasilar atelectasis.                  Assessment/Plan:    Active Hospital Problems    Diagnosis     Incarcerated right inguinal hernia [K40.30]     SBO (small bowel obstruction) ([facility] [address] [K56.609]     PAD (peripheral artery disease) ([facility]) [I73.9]     Celiac artery stenosis ([facility]) [I77.4]     Malignant hypertensive urgency [I16.0]     PAF (paroxysmal atrial fibrillation) ([facility]) [I48.0]     Aneurysm of thoracic aorta ([facility] [address] [I71.2]      Malignant hypertensive urgency with troponin leak  - POA with -170's 100's, initial trop was 0.02, trended out 0.01> 0.01  -EKG obtained in ED;without evidence of LAD or acute ischemia  -Home medications placed on hold due to strict n.p.o. status  -IV hydralazine scheduled PRN (with parameters) to address extreme BP elevation  -ECHO scheduled to further assess cardiac structure and function  - suspect some anxiety and pain component  - improved but remains higher than would like -improving    Inguinal hernia with SBO  -Consultation placed to surgery with plans for conservative management overnight  -NG tube in place, and patient remains strict n.p.o., improving NG out trialing clears   -Serial lactate levels to be trended  -IV morphine available sparingly for pain control as needed  -Due to celiac stent as well as PAF, anticoagulation unable to be discontinued abruptly; patient initiated on heparin GTT bridge  - 6/9 underwent right hernia repair  -Postoperatively continues to do well tolerating p.o. positive flatus     Unintentional weight loss with cachexia  -Etiology unclear at this time; concern for underlying occult process/malignancy  -TSH/ free T4, vitamin D, B12/folate, prealbumin and A1c lab work ordered - WNL  -ECHO scheduled to further assess cardiac function  -PT/OT consultation placed for evaluation and treatment  -Consultation placed to GI for further evaluation of malabsorption/anorexia  -Continue with supportive supplemental nutrition     Chronic anticoagulation  -Patient with history of PAF as well as celiac artery stenosis typically maintained on Eliquis 2.5 mg twice daily  -Due to n.p.o. status, patient was initiated on low-dose weight-based heparin drip (without initial bolus) for bridging therapy   - can resume Oral when OK with surgery   - 6/11- AF with RVR given iv metoprolol and amiodarone bolus.  THis am only some mild improvement in rate, resume PO home BB and eliquis, check labs and give 500 NS bolus   -Consulted cardiology started on diltiazem drip drip, now weaned off, heart rate is controlled beta-blocker dose increased tolerating well    Underlying anxiety: family states that she is an anxious person has been benefiting   - has benefited from prn low dose ativan   - pt would likely from oral antianxiety such as Remeron     DVT Prophylaxis: low-dose weight-based heparin drip initiated  Diet: ADULT DIET; Easy to Chew  Adult Oral Nutrition Supplement; Standard High Calorie/High Protein Oral Supplement  Adult Oral Nutrition Supplement; Frozen Oral Supplement  Code Status: Full Code    PT/OT Eval Status: ordered     Dispo -since still drip was recently DC'd would monitor overnight and then can discharge from a medical standpoint if remains stable tomorrow    Xenia Johnson, APRN - CNP

## 2021-06-12 NOTE — PROGRESS NOTES
[facility] GENERAL SURGERY    Surgery Progress Note           POD # 3    PATIENT NAME: Sharlene Rosas     TODAY'S DATE: 6/12/2021    INTERVAL HISTORY:    Pt feels better today. She is tolerating a diet. .     OBJECTIVE:   VITALS:  BP (!) 159/90   Pulse 86   Temp 98.1 °F (36.7 °C) (Oral)   Resp 17   Ht 5' 3\" (1.6 m)   Wt 90 lb 14.4 oz (41.2 kg)   SpO2 95%   BMI 16.10 kg/m²     INTAKE/OUTPUT:    I/O last 3 completed shifts: In: 605.5 [P.O.:540; I.V.:65.5]  Out: 1075 [Urine:1075]  No intake/output data recorded. CONSTITUTIONAL:  awake and alert  LUNGS:  clear to auscultation  ABDOMEN:   normal bowel sounds, soft, non-distended   INCISION: clean, dry    Data:  CBC:   Recent Labs     06/10/21  0435 06/11/21  0700 06/12/21  0610   WBC 7.3 5.8 5.8   HGB 13.2 12.2 11.7*   HCT 39.8 35.7* 34.7*    217 200     BMP:    Recent Labs     06/10/21  0435 06/11/21  0700 06/12/21  0610    140 139   K 4.5 4.1 3.6   CL 97* 102 101   CO2 25 29 30   BUN 23* 23* 26*   CREATININE 1.1 0.9 0.8   GLUCOSE 141* 91 104*     Hepatic: No results for input(s): AST, ALT, ALB, BILITOT, ALKPHOS in the last 72 hours. Mag:      Recent Labs     06/11/21  0700   MG 1.80      Phos:   No results for input(s): PHOS in the last 72 hours. INR: No results for input(s): INR in the last 72 hours. Radiology Review: N/A    ASSESSMENT AND PLAN:  78 y.o. female status post robotic right femoral hernia repair with mesh with postoperative A. fib. She is tolerating a diet. Plan for discharge from surgical standpoint when medically stable.         Electronically signed by Ruthie Atkisnon MD

## 2021-06-12 NOTE — PROGRESS NOTES
Message sent to [facility]: patient's heart rate keeps jumping between 120'-130's. Please advise.

## 2021-06-12 NOTE — PROGRESS NOTES
[facility] Daily Progress Note      Admit Date:  6/6/2021    Subjective:  Ms. Franklin Smart is seen for cardiology follow up  [place]: she has parox afib and is on RX with anticoagulant under care of Dr Cristhian Dsouza. She is admitted with small bowel obstruction which has resolved. 78 y.o. female status post robotic right femoral hernia repair with mesh with postoperative A. fib.      ROS:  12 point ROS negative in all areas as listed below except as in [place]  Constitutional, EENT, Cardiovascular, pulmonary, GI, , Musculoskeletal, skin, neurological, hematological, endocrine, Psychiatric    Past Medical History:   Diagnosis Date    CAD (coronary artery disease)     Cancer ([facility] [place] [address] 2016    skin cancer    Carotid artery disease (HCC)     HTN (hypertension)     Hyperlipemia     Hyperlipidemia     LBP (low back pain)     Lung nodules     Osteoporosis     Seasonal allergies     Stress bladder incontinence, female     Unspecified cerebral artery occlusion with cerebral infarction     Wears glasses      Past Surgical History:   Procedure Laterality Date    CARDIAC CATHETERIZATION      COLONOSCOPY  8/13/2014    HERNIA REPAIR Right 6/9/2021    ROBOTIC RIGHT INGUINAL HERNIA REPAIR performed by Gladis Watts MD at [address] [place] [address]         Objective:   BP (!) 144/89   Pulse 87   Temp 98.6 °F (37 °C) (Oral)   Resp 16   Ht 5' 3\" (1.6 m)   Wt 90 lb 14.4 oz (41.2 kg)   SpO2 94%   BMI 16.10 kg/m²       Intake/Output Summary (Last 24 hours) at 6/12/2021 1451  Last data filed at 6/12/2021 1419  Gross per 24 hour   Intake 925.48 ml   Output 725 ml   Net 200.48 ml       TELEMETRY:NSR    Physical Exam:  General: No Respiratory distress, appears well developed and well nourished.    Eyes:  Sclera nonicteric  Nose/Sinuses:  negative findings: nose shows no deformity, asymmetry, or inflammation, nasal mucosa normal, septum midline with no perforation or bleeding  Back:  no pain to palpation  Joint: no active joint inflammation  Musculoskeletal:  negative  Skin:  Warm and dry  Neck:  Negative for JVD and Carotid Bruits. Chest:  Clear to auscultation, respiration easy  Cardiovascular:  RRR, S1S2 normal, no murmur, no rub or thrill. Abdomen:  Soft normal liver and spleen  Extremities:   No edema, clubbing, cyanosis,  Pulses: Femoral and pedal pulses are normal.  Neuro: intact    Medications:    apixaban  2.5 mg Oral BID    folic acid  605 mcg Oral Daily    metoprolol succinate  100 mg Oral Daily    atorvastatin  20 mg Oral Nightly    metoprolol succinate  25 mg Oral Nightly    sodium chloride flush  10 mL Intravenous 2 times per day    pantoprazole  40 mg Intravenous Daily      dilTIAZem Stopped (06/11/21 1756)    sodium chloride       perflutren lipid microspheres, LORazepam, sodium chloride flush, sodium chloride, potassium chloride **OR** potassium alternative oral replacement **OR** potassium chloride, magnesium sulfate, promethazine **OR** ondansetron, acetaminophen **OR** acetaminophen, bisacodyl, morphine **OR** morphine, phenol    Lab Data:  CBC:   Recent Labs     06/10/21  0435 06/11/21  0700 06/12/21  0610   WBC 7.3 5.8 5.8   HGB 13.2 12.2 11.7*   HCT 39.8 35.7* 34.7*   MCV 95.0 93.1 93.8    217 200     BMP:   Recent Labs     06/10/21  0435 06/11/21  0700 06/12/21  0610    140 139   K 4.5 4.1 3.6   CL 97* 102 101   CO2 25 29 30   BUN 23* 23* 26*   CREATININE 1.1 0.9 0.8     LIVER PROFILE: No results for input(s): AST, ALT, LIPASE, BILIDIR, BILITOT, ALKPHOS in the last 72 hours. Invalid input(s): AMYLASE,  ALB  PT/INR: No results for input(s): PROTIME, INR in the last 72 hours. APTT:   Recent Labs     06/10/21  0435 06/11/21  0700 06/12/21  0610   APTT 30.5 27.2 30.9     BNP:  No results for input(s): BNP in the last 72 hours.   IMAGING:     Assessment:  Paroxysmal atrial fibrillations  S/p femoral hernia repair  Patient Active Problem List    Diagnosis Date Noted   

## 2021-06-13 NOTE — PROGRESS NOTES
Patient in bed awake. A/O x 4. Assessment completed and charted. VSS. Respirations even and unlabored. Denies pain at this time. Ambulates with walker and standby assist. Bed in lowest position and locked. Call light in reach.

## 2021-06-13 NOTE — PROGRESS NOTES
Pt alert and oriented. VSS, room air. Assessment completed as charted. Abdominal incision C/D/I. Pt passing gas, states had BM today. Denies any pain or nausea at present time. Still with c/o cough. Up with standby assist with walker, tolerating well. Resting in bed, denies needs at present time. Call light and bedside table within reach. Bed locked and in lowest position.

## 2021-06-13 NOTE — DISCHARGE SUMMARY
Surgery Discharge Summary    Patient Identification  Ezio Lindsay is a 78 y.o. female. :  1940  Admit Date:  2021    Discharge date:   No discharge date for patient encounter. Disposition: SNF    Discharge Diagnoses:   Principal Problem:    SBO (small bowel obstruction) (HCC)  Active Problems:    Aneurysm of thoracic aorta (HCC)    PAF (paroxysmal atrial fibrillation) (HCC)    Incarcerated right inguinal hernia    PAD (peripheral artery disease) (HCC)    Celiac artery stenosis (HCC)    Malignant hypertensive urgency  Resolved Problems:    * No resolved hospital problems. *      Discharge condition: good    Discharge Medications:     Current Discharge Medication List      CONTINUE these medications which have NOT CHANGED    Details   clopidogrel (PLAVIX) 75 MG tablet Take 75 mg by mouth daily      simvastatin (ZOCOR) 20 MG tablet TAKE ONE TABLET BY MOUTH DAILY  Qty: 90 tablet, Refills: 3    Associated Diagnoses: Mixed hyperlipidemia      !! metoprolol succinate (TOPROL XL) 100 MG extended release tablet Take 1 tablet by mouth daily  Qty: 90 tablet, Refills: 3    Associated Diagnoses: Essential hypertension      spironolactone (ALDACTONE) 25 MG tablet Take 25 mg by mouth daily      apixaban (ELIQUIS) 2.5 MG TABS tablet Take 1 tablet by mouth 2 times daily  Qty: 60 tablet, Refills: 0      acetaminophen (TYLENOL) 500 MG tablet Take 500 mg by mouth every 6 hours as needed for Pain      magnesium (MAGNESIUM-OXIDE) 250 MG TABS tablet Take 250 mg by mouth daily. Coenzyme Q10 (COQ10) 50 MG CAPS Take  by mouth 2 times daily. vitamin D 25 MCG (1000 UT) CAPS Take 1,000 Units by mouth daily       vitamin E 400 UNIT capsule Take 400 Units by mouth daily. Calcium Citrate-Vitamin D 200-250 MG-UNIT TABS Take  by mouth. Ascorbic Acid (VITAMIN C) 500 MG tablet Take 1,000 mg by mouth daily.         folic acid (FOLVITE) 1 MG tablet Take 400 mcg by mouth daily

## 2021-06-13 NOTE — PROGRESS NOTES
hours. No results for input(s): INR in the last 72 hours. Recent Labs     06/11/21  0700 06/11/21  1250 06/11/21  1856   TROPONINI 0.03* 0.06* 0.08*       Urinalysis:      Lab Results   Component Value Date    NITRU Negative 06/06/2021    WBCUA 0-2 06/06/2021    BACTERIA 4+ 07/07/2020    RBCUA 0-2 06/06/2021    BLOODU Negative 06/06/2021    SPECGRAV 1.010 06/06/2021    GLUCOSEU Negative 06/06/2021       Radiology:  XR CHEST PORTABLE   Final Result   Diffuse bilateral opacity can reflect pulmonary edema or atypical infection. A component of chronic lung disease may also be contributory. Small right   pleural effusion. Questionable pneumoperitoneum. If patient has not had recent intra-abdominal   surgery, dedicated abdominal radiographic series is suggested for further   evaluation. Soft tissue emphysema about the shoulders bilaterally. Findings were called by the radiology call center. XR ACUTE ABD SERIES CHEST 1 VW   Final Result   Malposition of nasogastric tube as above, the tube should be advanced. The findings were sent to the Radiology Results [address] at 8:43   am on 6/7/2021to be communicated to a licensed caregiver. XR ABDOMEN (KUB) (SINGLE AP VIEW)   Final Result   Enteric catheter side port just beyond the GE junction with the tip in the   proximal gastric body. XR ABDOMEN (KUB) (SINGLE AP VIEW)   Final Result   Unremarkable limited KUB. NG tube tip projects at the left upper quadrant, overlying the expected   location of the stomach. Side port of the NG tube projects at the distal   esophagus esophagogastric junction level. Consider dancing mid NG tube   several cm such that the side port is beyond esophagogastric junction. CTA ABDOMINAL AORTA W BILAT RUNOFF W CONTRAST   Final Result   No pseudoaneurysm is identified in the groins. There is a right inguinal hernia with small bowel content, possibly   incarcerated segment. There is mild dilation of the upstream small bowel,   consistent with evolving small bowel obstruction. Small amount of free fluid   is also noted. Mesenterorenal arterial disease. Celiac trunk stent placed in interval is   widely patent. Mild proximal SMA stenosis stable. Severe right and mild   left proximal renal artery stenoses stable. Aortoiliac vessels are widely patent. Femoropopliteal segments are patent, with mild, variable stenoses. Right anterior tibial and left peroneal one- vessel runoff. Small bilateral pleural effusions and bibasilar atelectasis.                  Assessment/Plan:    Active Hospital Problems    Diagnosis     Incarcerated right inguinal hernia [K40.30]     SBO (small bowel obstruction) ([facility] [address] [K56.609]     PAD [facility]peripheral artery disease) ([facility]) [I73.9]     Celiac artery stenosis ([facility]) [I77.4]     Malignant hypertensive urgency [I16.0]     PAF (paroxysmal atrial fibrillation) ([facility]) [I48.0]     Aneurysm of thoracic aorta ([facility] [address] [I71.2]      Malignant hypertensive urgency with troponin leak  - POA with -170's 100's, initial trop was 0.02, trended out 0.01> 0.01  -EKG obtained in ED;without evidence of LAD or acute ischemia  -Home medications placed on hold due to strict n.p.o. status  -IV hydralazine scheduled PRN (with parameters) to address extreme BP elevation  -ECHO scheduled to further assess cardiac structure and function  - suspect some anxiety and pain component  - improved but remains higher than would like -improving    Inguinal hernia with SBO  -Consultation placed to surgery with plans for conservative management overnight  -NG tube in place, and patient remains strict n.p.o., improving NG out trialing clears   -Serial lactate levels to be trended  -IV morphine available sparingly for pain control as needed  -Due to celiac stent as well as PAF, anticoagulation unable to be discontinued abruptly; patient initiated on heparin GTT bridge  -

## 2021-06-13 NOTE — CARE COORDINATION
CASE MANAGEMENT DISCHARGE SUMMARY      [facility]charge to: [facility]    Precertification completed: [facility] Exemption Notification (HENS) completed: yes    New Durable Medical Equipment ordered/agency: na    Transportation:    Medical Transport explained to pt/family. Pt/family voice no agency preference. Agency used:[facility]   time:1300   Ambulance form completed: Yes    Confirmed discharge plan with:RN,[facility], [place]     Patient: yes       Facility/Agency, name:  [facility] [phone]   Phone number for report to facility: [phone]     RN, name: [place]    Note: Discharging nurse to complete [facility], reconcile [facility], and place final copy with patient's discharge packet. RN to ensure that written prescriptions for  Level II medications are sent with patient to the facility as per protocol.

## 2021-06-30 NOTE — TELEPHONE ENCOUNTER
Ordered. Not sure who will talk this through with patient but let's make sure we call her so she knows we are ordering as recommended by Dr Dandre Hall.

## 2021-06-30 NOTE — TELEPHONE ENCOUNTER
Radha Poole called from Dr Neisha Laws office and had a request from the Speech Pathologist/Roseann for a modified barium swallow and regular barium swallow for malnourishment, indigestion, and nausea. Dr Jemima Hernandez states this order should come from PCP. Can we order this?  Please advise

## 2021-06-30 NOTE — CARE COORDINATION
[facility] Transitions Initial Follow Up Call    Call within 2 business days of discharge: Yes    Patient: Bryce Rodriges Patient : 1940   MRN: [id number]  Reason for Admission: SBO  Discharge Date: 21 RARS: Readmission Risk Score: 17      Last Discharge [facility]       Complaint Diagnosis Description Type Department Provider    21 Abdominal Pain Incarcerated right inguinal hernia . .. ED to Hosp-Admission (Discharged) (ADMITTED) Celi Garcia MD; Demetri Weinstein ... Acute Care Course: Patient admitted to [facility] for a SBO from -. She discharged to the [facility] for rehab from -. Sig Hx: Aneurysm of thoracic aorta, PAF, Incarcerated R inguinal hernia, PAD, Celiac artery stenosis, Malignant hypertensive urgency    DME:     Conversation: Patient states she slept well. She confirm being happy to be home. Caller explained role. She said her son is visiting from [place] to make sure everything is okay. States she has to get him back home because he is using his vacation. Denies pain, n/v, fever, chills, bowel or bladder concerns. States she is sob and it has been that way for years. States \"I am in the hallway trying to get to the kitchen and fix something to eat\". \"You are asking me all these questions and I am not a \". Caller asked if she would like a call back. She states\" I don't know\". \"I might be dead or alive\" \" Right now I don't feel like answering all these questions\". Caller complied. Follow up plan:  Will attempt again    Non-face-to-face services provided:      Care Transitions 24 Hour Call    Care Transitions Interventions         Follow Up  Future Appointments   Date Time Provider Antony Charlton   2021 12:30 PM Liz Abraham MD [facility]   2021  1:00 PM Preeti Standard, APRN - CNP [facility] - [facility]       Mary Jo Hurd RN

## 2021-07-01 NOTE — TELEPHONE ENCOUNTER
Patient is calling and wants to know what it was that the hospital gave her for being anxious and nervous? Was wanting to know if she can get something prescribed to her? She went from having surgery to rehab and they gave her some medicine to keep her calm and from getting nervous/anxious.        [phone]

## 2021-07-07 PROBLEM — R11.2 N&V (NAUSEA AND VOMITING): Status: ACTIVE | Noted: 2021-01-01

## 2021-07-07 NOTE — ED PROVIDER NOTES
[place] [facility] - [facility]  EMERGENCY DEPARTMENT ENCOUNTER        Pt Name: Cesar Hernandes  MRN: [id number]  [place] 1940  Date of evaluation: 7/7/2021  Provider: MARIANELA Rodriguez  PCP: NED Pierson - CNP  Note Started: 4:38 PM EDT        I have seen and evaluated this patient with my supervising physician Dr. Desire Sharp   Patient presents with    Anorexia     son made her come because she can't eat, pt states she has been nauseated for a year       HISTORY OF PRESENT ILLNESS   (Location, Timing/Onset, Context/Setting, Quality, Duration, Modifying Factors, Severity, Associated Signs and Symptoms)  Note limiting factors. Chief Complaint: Nausea and vomiting     Cesar Hernandes is a 78 y.o. female who presents to the emergency room with a chief complaint of nausea and vomiting. Patient reports that she has had persistent nausea for approximately 1 year. It has gotten significantly worse since having surgery for inguinal hernia in June. Over the last week it has gotten to the point that she cannot tolerate food and has not been eating or drinking. She has follow-up with speech pathology to told her that she needs to use thickened liquids, but she does not believe the issue is with her swallowing. She has not trialed medications for the nausea recently. She denies fever, chest pain, shortness of breath, abdominal pain, dysuria, urinary frequency, diarrhea, constipation. Nursing Notes were all reviewed and agreed with or any disagreements were addressed in the HPI. REVIEW OF SYSTEMS    (2-9 systems for level 4, 10 or more for level 5)     Review of Systems   Constitutional: Negative for fever. HENT: Negative for sore throat. Eyes: Negative for pain and visual disturbance. Respiratory: Negative for cough and shortness of breath. Cardiovascular: Negative for chest pain. Gastrointestinal: Positive for nausea and vomiting. Negative for abdominal pain. Genitourinary: Negative for dysuria and frequency. Musculoskeletal: Negative for back pain and neck pain. Skin: Negative for rash. Neurological: Negative for dizziness, weakness, numbness and headaches. Psychiatric/Behavioral: Negative for confusion. Positives and Pertinent negatives as per HPI. Except as noted above in the ROS, all other systems were reviewed and negative.        PAST MEDICAL HISTORY     Past Medical History:   Diagnosis Date    CAD (coronary artery disease)     Cancer ([facility] [address] 2016    skin cancer    Carotid artery disease (HCC)     HTN (hypertension)     Hyperlipemia     Hyperlipidemia     LBP (low back pain)     Lung nodules     Osteoporosis     Seasonal allergies     Stress bladder incontinence, female     Unspecified cerebral artery occlusion with cerebral infarction     Wears glasses          SURGICAL HISTORY     Past Surgical History:   Procedure Laterality Date    CARDIAC CATHETERIZATION      COLONOSCOPY  8/13/2014    HERNIA REPAIR Right 6/9/2021    ROBOTIC RIGHT INGUINAL HERNIA REPAIR performed by Chiqui Rivera MD at [address] [place] [address]       Current Discharge Medication List      CONTINUE these medications which have NOT CHANGED    Details   PARoxetine (PAXIL) 20 MG tablet Take 1 tablet by mouth daily  Qty: 30 tablet, Refills: 1      !! metoprolol succinate (TOPROL XL) 25 MG extended release tablet Take 1 tablet by mouth nightly  Qty: 30 tablet, Refills: 3      clopidogrel (PLAVIX) 75 MG tablet Take 75 mg by mouth daily      simvastatin (ZOCOR) 20 MG tablet TAKE ONE TABLET BY MOUTH DAILY  Qty: 90 tablet, Refills: 3    Associated Diagnoses: Mixed hyperlipidemia      !! metoprolol succinate (TOPROL XL) 100 MG extended release tablet Take 1 tablet by mouth daily  Qty: 90 tablet, Refills: 3    Associated Diagnoses: Essential hypertension      spironolactone (ALDACTONE) 25 MG tablet Take 25 mg by mouth daily      apixaban (ELIQUIS) 2.5 MG TABS tablet Take 1 tablet by mouth 2 times daily  Qty: 60 tablet, Refills: 0      acetaminophen (TYLENOL) 500 MG tablet Take 500 mg by mouth every 6 hours as needed for Pain      glucosamine-chondroitin 500-400 MG tablet Take by mouth 2 times daily       magnesium (MAGNESIUM-OXIDE) 250 MG TABS tablet Take 250 mg by mouth daily       Coenzyme Q10 (COQ10) 50 MG CAPS Take by mouth 2 times daily       vitamin D 25 MCG (1000 UT) CAPS Take 1,000 Units by mouth daily        aspirin 81 MG chewable tablet Take 325 mg by mouth daily        therapeutic multivitamin-minerals (THERAGRAN-M) tablet Take 1 tablet by mouth daily        vitamin E 400 UNIT capsule Take 400 Units by mouth daily        Calcium Citrate-Vitamin D 200-250 MG-UNIT TABS Take by mouth        Ascorbic Acid (VITAMIN C) 500 MG tablet Take 1,000 mg by mouth daily        folic acid (FOLVITE) 1 MG tablet Take 400 mcg by mouth daily        Zinc 50 MG CAPS Take by mouth         !! - Potential duplicate medications found. Please discuss with provider. ALLERGIES     Pneumococcal vaccines    FAMILYHISTORY       Family History   Problem Relation Age of Onset    Heart Disease Sister     Stroke Mother           SOCIAL HISTORY       Social History     Tobacco Use    Smoking status: Never Smoker    Smokeless tobacco: Never Used   Vaping Use    Vaping Use: Never used   Substance Use Topics    Alcohol use: No    Drug use: No       SCREENINGS    Christoph Coma Scale  Eye Opening: Spontaneous  Best Verbal Response: Oriented  Best Motor Response: Obeys commands  [place] Coma Scale Score: 15        PHYSICAL EXAM    (up to 7 for level 4, 8 or more for level 5)     ED Triage Vitals [07/07/21 0920]   BP Temp Temp Source Pulse Resp SpO2 Height Weight   128/82 98.4 °F (36.9 °C) Oral 79 19 94 % 5' 2\" (1.575 m) 89 lb (40.4 kg)       Physical Exam  Vitals reviewed. Constitutional:       Appearance: She is not diaphoretic. Comments: Very thin appearing elderly female. HENT:      Nose: No congestion or rhinorrhea. Eyes:      General: No scleral icterus. Conjunctiva/sclera: Conjunctivae normal.   Cardiovascular:      Rate and Rhythm: Normal rate and regular rhythm. Pulses: Normal pulses. Heart sounds: Normal heart sounds. No murmur heard. No friction rub. No gallop. Pulmonary:      Effort: Pulmonary effort is normal. No respiratory distress. Breath sounds: Normal breath sounds. No stridor. No wheezing, rhonchi or rales. Abdominal:      General: There is no distension. Palpations: Abdomen is soft. Tenderness: There is no abdominal tenderness. There is no right CVA tenderness, left CVA tenderness, guarding or rebound. Musculoskeletal:         General: Normal range of motion. Cervical back: Normal range of motion and neck supple. Skin:     General: Skin is warm and dry. Neurological:      Mental Status: She is alert and oriented to person, place, and time. Psychiatric:      Comments: Anxious mood and affect.          DIAGNOSTIC RESULTS   LABS:    Labs Reviewed   CBC WITH AUTO DIFFERENTIAL - Abnormal; Notable for the following components:       Result Value    Lymphocytes Absolute 0.8 (*)     All other components within normal limits    Narrative:     Performed at:  [place] [address] [employer]   Phone [phone]   COMPREHENSIVE METABOLIC PANEL - Abnormal; Notable for the following components:    Glucose 154 (*)     BUN 26 (*)     GFR Non-African American 60 (*)     Albumin/Globulin Ratio 1.0 (*)     ALT 8 (*)     All other components within normal limits    Narrative:     Performed at:  [place] [address] [employer]   Phone [phone]   LIPASE - Abnormal; Notable for the following components:    Lipase 82.0 (*)     All other components within normal limits Narrative:     Performed at:  [facility]  [address],  [place], [place] [email or web address]   Phone [phone]   TROPONIN - Abnormal; Notable for the following components:    Troponin 0.02 (*)     All other components within normal limits    Narrative:     Performed at:  [facility]  [address] [place] [address],  [place], [place] [email or web address]   Phone [phone]   URINE RT REFLEX TO CULTURE    Narrative:     Performed at:  [address], [place] [email or web address]   Phone [phone]       When ordered only abnormal lab results are displayed. All other labs were within normal range or not returned as of this dictation. EKG: When ordered, EKG's are interpreted by the Emergency Department Physician in the absence of a cardiologist.  Please see their note for interpretation of EKG. RADIOLOGY:   Non-plain film images such as CT, Ultrasound and MRI are read by the radiologist. Plain radiographic images are visualized and preliminarily interpreted by the ED Provider with the below findings:        Interpretation per the Radiologist below, if available at the time of this note:    CT ABDOMEN PELVIS W IV CONTRAST Additional Contrast? None   Final Result   1. Small nonspecific right lower quadrant rim enhancing fluid collection. The differential includes seroma, hematoma and abscess. 2. Diverticulosis. 3. Mild bibasilar atelectasis versus pneumonia. XR CHEST PORTABLE   Final Result   Trace pleural effusions. Sequela of granulomatous disease. Unchanged coarse   parenchymal opacity, suggestive of chronic lung disease.            CT ABDOMEN PELVIS W IV CONTRAST Additional Contrast? None    Result Date: 7/7/2021  EXAMINATION: CT OF THE ABDOMEN AND PELVIS WITH CONTRAST 7/7/2021 10:25 am TECHNIQUE: CT of the abdomen and pelvis was performed with the administration of intravenous contrast. Multiplanar reformatted images are provided for review. Dose modulation, iterative reconstruction, and/or weight based adjustment of the mA/kV was utilized to reduce the radiation dose to as low as reasonably achievable. COMPARISON: 06/06/2021 HISTORY: ORDERING SYSTEM PROVIDED HISTORY: Nausea s/p hernia surgery TECHNOLOGIST PROVIDED HISTORY: Additional Contrast?->None Reason for exam:->Nausea s/p hernia surgery Decision Support Exception - unselect if not a suspected or confirmed emergency medical condition->Emergency Medical Condition (MA) Reason for Exam: nausea, pt states she has had it for \"a long time\", unsure how long, right inguinal hernia repair 6/2021 Acuity: Unknown Type of Exam: Unknown Additional signs and symptoms: no other prev surgery FINDINGS: Lower Chest: There is patchy bibasilar consolidation along with a trace amount of bilateral pleural fluid. Organs: The liver, pancreas, spleen, kidneys and adrenals are unremarkable. GI/Bowel: There is no bowel dilatation, wall thickening or obstruction. Diverticular changes are noted throughout the left hemicolon. The appendix is normal. Pelvis: A small amount of nonspecific fluid is present within the dependent pelvis. The pelvic organs and urinary bladder are unremarkable. Peritoneum/Retroperitoneum: There is no free air or adenopathy. There is a small rim enhancing fluid collection within the right lower quadrant just beneath the skin surface/inferior epigastric vessels and anterior to the right external iliac vessels. This measures 2.9 cm in diameter. Bones/Soft Tissues: There is no acute fracture or aggressive osseous lesion. There is a small collection of gas within the left lower quadrant abdominal wall subcutaneous tissues and right inguinal canal, likely related to recent hernia repair. 1. Small nonspecific right lower quadrant rim enhancing fluid collection. The differential includes seroma, hematoma and abscess. 2. Diverticulosis.  3. Mild bibasilar atelectasis versus pneumonia. XR CHEST PORTABLE    Result Date: 7/7/2021  EXAMINATION: ONE XRAY VIEW OF THE CHEST 7/7/2021 9:42 am COMPARISON: 06/11/2021 HISTORY: ORDERING SYSTEM PROVIDED HISTORY: Nausea TECHNOLOGIST PROVIDED HISTORY: Reason for exam:->Nausea Reason for Exam: anorexia Acuity: Acute Type of Exam: Initial Additional signs and symptoms: anorexia for a year unable to eat due to nausea FINDINGS: Cardiac leads project over the chest.  Biapical pleuroparenchymal thickening is similar to prior. Dense nodule within the right mid lung is similar to prior, likely granuloma. Dense nodules within the left mid lung are also similar to prior. Blunting of the costophrenic angles. Coarse parenchymal pattern bilaterally is similar to prior. No gross pneumothorax. Cardiac and mediastinal silhouettes are unchanged. Trace pleural effusions. Sequela of granulomatous disease. Unchanged coarse parenchymal opacity, suggestive of chronic lung disease. PROCEDURES   Unless otherwise noted below, none     Procedures    CRITICAL CARE TIME   N/A    CONSULTS:  IP CONSULT TO HOSPITALIST  IP CONSULT TO SOCIAL WORK      EMERGENCY DEPARTMENT COURSE and DIFFERENTIAL DIAGNOSIS/MDM:   Vitals:    Vitals:    07/07/21 1131 07/07/21 1232 07/07/21 1401 07/07/21 1530   BP: (!) 168/99 (!) 143/90 (!) 150/93 (!) 146/82   Pulse: 70 71 57 63   Resp: 20 18 18 18   Temp:    97.8 °F (36.6 °C)   TempSrc:    Oral   SpO2: 96% 96% 97% 95%   Weight:       Height:           Patient was given the following medications:  Medications   ondansetron (ZOFRAN) injection 4 mg (4 mg Intravenous Given 7/7/21 0956)   iopamidol (ISOVUE-370) 76 % injection 75 mL (75 mLs Intravenous Given 7/7/21 1036)   metoclopramide (REGLAN) injection 5 mg (5 mg Intravenous Given 7/7/21 1139)   diphenhydrAMINE (BENADRYL) injection 25 mg (25 mg Intravenous Given 7/7/21 1138)           78-year-old female presents to the emergency room for nausea and vomiting.   States she is not tolerating solids or liquids. She denies abdominal pain and does not have abdominal tenderness, due to her age and recent surgery I did elect to order CT abdomen pelvis with IV contrast for further assessment. Also ordered labs. Labs reassuring. CT abdomen pelvis with nonspecific fluid collection in the right lower quadrant, radiologist differential includes seroma, hematoma, abscess. I have low suspicion for abscess as she does not have fever or tenderness, suspect seroma or hematoma in the setting of her recent surgery. Patient was given Zofran and Reglan in the emergency room. She notes some improvement of her nausea, but still states she cannot trial food due to her nausea. Will admit for failure to thrive. Discussed with hospitalist Dr. Lauren Cr, has graciously agreed to admit the patient to [facility]. FINAL IMPRESSION      1. Failure to thrive in adult    2. Nausea          DISPOSITION/PLAN   DISPOSITION Admitted 07/07/2021 01:01:08 PM      PATIENT REFERRED TO:  No follow-up provider specified.     DISCHARGE MEDICATIONS:  Current Discharge Medication List          DISCONTINUED MEDICATIONS:  Current Discharge Medication List                 (Please note that portions of this note were completed with a voice recognition program.  Efforts were made to edit the dictations but occasionally words are mis-transcribed.)    MARIANELA Boudreaux (electronically signed)         MARIANELA Boudreaux  07/07/21 0578

## 2021-07-07 NOTE — ED PROVIDER NOTES
I independently performed a history and physical on Benita Lopez. All diagnostic, treatment, and disposition decisions were made by myself in conjunction with the advanced practice provider. Briefly, this is a 78 y.o. female here for nausea. Ongoing for over 1 year. She is tolerating liquids without issue however she cannot tolerate solid food. Denies any pain. Is still taking care of her activities of daily living. Lives alone. Presents today because her son made her come in. She has progressively been losing weight. She does not know how much weight she has lost.  No fevers or chills. No cough. No chest pain or shortness of breath. .    On exam,   General: Patient is in no acute distress. Skin: No cyanosis  HEENT: dry mucous membranes  Heart: Regular rate, regular rhythm  Lung: No respiratory distress  Abdomen: Soft, nontender  Neuro: Moving all extremities, no facial droop, no slurred speech, answers questions appropriately        Screenings   [place] Coma Scale  Eye Opening: Spontaneous  Best Verbal Response: Oriented  Best Motor Response: Obeys commands  Christoph Coma Scale Score: 15        MDM  Briefly, this is a 78 y.o. female here for nausea. Given Zofran with improvement. She has no abdominal pain. CT abdomen showed possible abscess versus hematoma versus seroma. She has no tenderness here. No signs of infection. This is likely more of a chronic issue. At this point, I believe that she does not emergently need antibiotics. I anticipate that the patient will need admission for failure to thrive. She has low BMI and is not tolerating solid food and lives alone. We will attempt to give her Reglan and unless she is tolerating intake well, will admit to hospitalist service. Patient Referrals:  No follow-up provider specified. Discharge Medications:  New Prescriptions    No medications on file       FINAL IMPRESSION  1.  Nausea        Blood pressure (!) 168/99, pulse 70, temperature 98.4 °F (36.9 °C), temperature source Oral, resp. rate 20, height 5' 2\" (1.575 m), weight 89 lb (40.4 kg), SpO2 96 %, not currently breastfeeding. For further details of [facility] emergency department encounter, please see documentation by advanced practice provider, Cedric. Kavin Brunner MD  07/07/21 4250

## 2021-07-07 NOTE — ED NOTES

## 2021-07-07 NOTE — PROGRESS NOTES
4 Eyes Skin Assessment     The patient is being assess for   Admission    I agree that 2 RN's have performed a thorough Head to Toe Skin Assessment on the patient. ALL assessment sites listed below have been assessed. Areas assessed for pressure by both nurses:   [x]   Head, Face, and Ears   [x]   Shoulders, Back, and Chest, Abdomen  [x]   Arms, Elbows, and Hands   [x]   Coccyx, Sacrum, and Ischium  [x]   Legs, Feet, and Heels        Skin Assessed Under all Medical Devices by both nurses:  NA              All Mepilex Borders were peeled back and area peeked at by both nurses:  No: NA  Please list where Mepilex Borders are located:  NA             **SHARE this note so that the co-signing nurse is able to place an eSignature**    Co-signer eSignature: Electronically signed by Maryuri Olson RN on 7/7/21 at 6:35 PM EDT    Does the Patient have Skin Breakdown related to pressure?   No     (Insert Photo here)         Brando Prevention initiated:  No   Wound Care Orders initiated:  No      WOC nurse consulted for Pressure Injury (Stage 3,4, Unstageable, DTI, NWPT, Complex wounds)and New or Established Ostomies:  No      Primary Nurse eSignature: Electronically signed by Elke New RN on 7/7/21 at 4:10 PM EDT

## 2021-07-07 NOTE — PROGRESS NOTES
Patient admitted to room 542 from ED. Patient oriented to room, call light, bed rails, phone, lights and bathroom. Patient instructed about the schedule of the day including: vital sign frequency, lab draws, possible tests, frequency of MD and staff rounds, including RN/MD rounding together at bedside, daily weights, and I &O's. Patient instructed about prescribed diet, how to use 8MENU, and television. Bed alarm in place, patient aware of placement and reason. Bed locked, in lowest position, side rails up 2/4, call light within reach. Will continue to monitor.

## 2021-07-08 NOTE — PROGRESS NOTES
Comprehensive Nutrition Assessment    Type and Reason for Visit:  Initial, Positive Nutrition Screen    Nutrition Recommendations/Plan:   1. Continue current regular diet and encourage PO intake  2. RD to add ensure enlive and magic cup TID   3. RD to order new updated weight   4. Monitor nutrition adequacy, pertinent labs, bowel habits, wt changes, and clinical progress    Nutrition Assessment:  Positive nutrition screen for wt loss, poor appetite and intake: Pt admitted with N/V. RD familiar with patient. Pt is nutritionally at risk AEB severe malnutrition, poor PO intake/appetite and weight loss. Currently on regular diet with ONS. Pt reports poor intake PTA but always drinks 3 ensures per day at home. Reports some reoccuring nausea. Pt previously reported UBW of 121 lb 6 months ago, but son reported that it was a few years ago. Stable at 89 lb for the past few months. RD to order updated weight to better assess weight changes. Pt willing to trial ONS- vanilla ensure enlive and magic cup with meals. Will continue to monitor. Malnutrition Assessment:  Malnutrition Status:  Severe malnutrition    Context:  Acute Illness     Findings of the 6 clinical characteristics of malnutrition:  Energy Intake:  Mild decrease in energy intake (Comment)  Body Fat Loss:  7 - Moderate body fat loss     Muscle Mass Loss:  7 - Moderate muscle mass loss      Estimated Daily Nutrient Needs:  Energy (kcal):  7826-3298 kcals/day; Weight Used for Energy Requirements:  Ideal (50 kg)     Protein (g):  50-60 g/day; Weight Used for Protein Requirements:  Ideal (1-1.2 g/kg)        Fluid (ml/day):  7615-7002 ml/day or per MD; Method Used for Fluid Requirements:  1 ml/kcal      Nutrition Related Findings:  Phos low, 2.4. + BM yesterday, per pt. Wounds:  Surgical Incision       Current Nutrition Therapies:    ADULT DIET;  Regular  Adult Oral Nutrition Supplement; Standard High Calorie/High Protein Oral Supplement    Anthropometric Measures:  · Height: 5' 2\" (157.5 cm)  · Current Body Weight: 89 lb (40.4 kg)     · Ideal Body Weight: 110 lbs; % Ideal Body Weight 80.9 %   · BMI: 16.3   · BMI Categories: Underweight (BMI less than 22) age over 72       Nutrition Diagnosis:   · Severe malnutrition related to pain, early satiety as evidenced by intake 26-50%, BMI, weight loss, poor intake prior to admission, moderate loss of subcutaneous fat, moderate muscle loss    Nutrition Interventions:   Food and/or Nutrient Delivery:  Continue Current Diet, Start Oral Nutrition Supplement, Continue Oral Nutrition Supplement  Nutrition Education/Counseling:  No recommendation at this time   Coordination of Nutrition Care:  Continue to monitor while inpatient    Goals:  Consume 50% or greater of 3 meals per day and ONS       Nutrition Monitoring and Evaluation:   Behavioral-Environmental Outcomes:  None Identified   Food/Nutrient Intake Outcomes:  Food and Nutrient Intake, Supplement Intake  Physical Signs/Symptoms Outcomes:  Chewing or Swallowing, Nausea or Vomiting, Nutrition Focused Physical Findings, Weight     Discharge Planning:    Continue current diet, Continue Oral Nutrition Supplement     Electronically signed by Allie Cade MS, RD, LD on 7/8/21 at 11:32 AM EDT    Contact: Office: [phone]; [address] [place] [address] Road: [phone]

## 2021-07-08 NOTE — H&P
Hospital Medicine History & Physical      PCP: NED Ross CNP    Date of Admission: 7/7/2021    Date of Service: Pt seen/examined on 8 July and Admitted to Inpatient with expected LOS greater than two midnights due to medical therapy. Chief Complaint:  N/V      History Of Present Illness:        78 y.o. female s/p recent SBO w/ incarceration having underwent R femoral hernia repair Early June 2021 who presented to [facility] with progressive N/V since surgery, although it had been present for the past year. Sxs worse over the last week unable to tolerate adequate PO. The patient denies any fever/chills or other signs/sxs of systemic illness or identifiable aggravating/alleviating factors. Past Medical History:          Diagnosis Date    CAD (coronary artery disease)     Cancer ([address] 2016    skin cancer    Carotid artery disease (HCC)     HTN (hypertension)     Hyperlipemia     Hyperlipidemia     LBP (low back pain)     Lung nodules     Osteoporosis     Seasonal allergies     Stress bladder incontinence, female     Unspecified cerebral artery occlusion with cerebral infarction     Wears glasses        Past Surgical History:          Procedure Laterality Date    CARDIAC CATHETERIZATION      COLONOSCOPY  8/13/2014    HERNIA REPAIR Right 6/9/2021    ROBOTIC RIGHT INGUINAL HERNIA REPAIR performed by Callie Gould MD at [address]         Medications Prior to Admission:      Prior to Admission medications    Medication Sig Start Date End Date Taking?  Authorizing Provider   PARoxetine (PAXIL) 20 MG tablet Take 1 tablet by mouth daily 7/2/21  Yes NED Ross CNP   metoprolol succinate (TOPROL XL) 25 MG extended release tablet Take 1 tablet by mouth nightly 6/13/21  Yes Car Vasquez MD   clopidogrel (PLAVIX) 75 MG tablet Take 75 mg by mouth daily   Yes Historical Provider, MD   simvastatin (ZOCOR) 20 MG tablet TAKE ONE TABLET BY MOUTH DAILY 1/4/21  Yes Albarojono Bermudez APRN - CNP   metoprolol succinate (TOPROL XL) 100 MG extended release tablet Take 1 tablet by mouth daily 1/4/21  Yes Kenn Anaya APRN - CNP   spironolactone (ALDACTONE) 25 MG tablet Take 25 mg by mouth daily 7/14/20  Yes Historical Provider, MD   apixaban (ELIQUIS) 2.5 MG TABS tablet Take 1 tablet by mouth 2 times daily 7/8/20  Yes Av Perales MD   acetaminophen (TYLENOL) 500 MG tablet Take 500 mg by mouth every 6 hours as needed for Pain   Yes Historical Provider, MD   glucosamine-chondroitin 500-400 MG tablet Take by mouth 2 times daily    Yes Historical Provider, MD   magnesium (MAGNESIUM-OXIDE) 250 MG TABS tablet Take 250 mg by mouth daily    Yes Historical Provider, MD   Coenzyme Q10 (COQ10) 50 MG CAPS Take by mouth 2 times daily    Yes Historical Provider, MD   vitamin D 25 MCG (1000 UT) CAPS Take 1,000 Units by mouth daily     Yes Historical Provider, MD   aspirin 81 MG chewable tablet Take 325 mg by mouth daily     Yes Historical Provider, MD   therapeutic multivitamin-minerals (THERAGRAN-M) tablet Take 1 tablet by mouth daily     Yes Historical Provider, MD   vitamin E 400 UNIT capsule Take 400 Units by mouth daily     Yes Historical Provider, MD   Calcium Citrate-Vitamin D 200-250 MG-UNIT TABS Take by mouth     Yes Historical Provider, MD   Ascorbic Acid (VITAMIN C) 500 MG tablet Take 1,000 mg by mouth daily     Yes Historical Provider, MD   folic acid (FOLVITE) 1 MG tablet Take 400 mcg by mouth daily     Yes Historical Provider, MD   Zinc 50 MG CAPS Take by mouth     Yes Historical Provider, MD       Allergies:  Pneumococcal vaccines    Social History:      The patient currently lives independently    TOBACCO:   reports that she has never smoked. She has never used smokeless tobacco.  ETOH:   reports no history of alcohol use. Family History:      Reviewed in detail and negative for DM, Cancer.  Positive as follows:        Problem Relation Age of Onset    Heart Disease Sister     Stroke Mother        REVIEW OF SYSTEMS:   Pertinent positives as noted in the HPI. All other systems reviewed and negative. PHYSICAL EXAM:    BP (!) 160/82   Pulse 84   Temp 97.5 °F (36.4 °C) (Oral)   Resp 18   Ht 5' 2\" (1.575 m)   Wt 89 lb (40.4 kg)   SpO2 96%   BMI 16.28 kg/m²     General appearance:  No apparent distress, appears stated age and cooperative. HEENT:  Normal cephalic, atraumatic without obvious deformity. Pupils equal, round, and reactive to light. Extra ocular muscles intact. Conjunctivae/corneas clear. Neck: Supple, with full range of motion. No jugular venous distention. Trachea midline. Respiratory:  Normal respiratory effort. Clear to auscultation, bilaterally without Rales/Wheezes/Rhonchi. Cardiovascular:  Regular rate and rhythm with normal S1/S2 without murmurs, rubs or gallops. Abdomen: Soft, non-tender, non-distended with normal bowel sounds. Musculoskeletal:  No clubbing, cyanosis or edema bilaterally. Full range of motion without deformity. Skin: Skin color, texture, turgor normal.  No rashes or lesions. Neurologic:  Neurovascularly intact without any focal sensory/motor deficits. Cranial nerves: II-XII intact, grossly non-focal.  Psychiatric:  Alert and oriented, thought content appropriate, normal insight  Capillary Refill: Brisk,< 3 seconds   Peripheral Pulses: +2 palpable, equal bilaterally       CXR:  I have reviewed the CXR with the following interpretation: No acute ASD   EKG:  I have reviewed the EKG with the following interpretation: NSR w/out acute ischemic changes.      Labs:     Recent Labs     07/07/21 0923 07/08/21  0546   WBC 7.1 4.5   HGB 13.3 11.4*   HCT 38.6 33.1*    220     Recent Labs     07/07/21  0923 07/08/21  0546    138   K 3.8 3.6    105   CO2 28 29   BUN 26* 15   CREATININE 0.9 0.7   CALCIUM 10.6 9.4   PHOS  --  2.4*     Recent Labs     07/07/21 0923   AST 17   ALT 8*   BILITOT 0.4   ALKPHOS 94 No results for input(s): INR in the last 72 hours. Recent Labs     07/07/21  0923   TROPONINI 0.02*       Urinalysis:      Lab Results   Component Value Date    NITRU Negative 07/07/2021    WBCUA 0-2 06/06/2021    BACTERIA 4+ 07/07/2020    RBCUA 0-2 06/06/2021    BLOODU Negative 07/07/2021    SPECGRAV 1.010 07/07/2021    GLUCOSEU Negative 07/07/2021         ASSESSMENT:    Active Hospital Problems    Diagnosis Date Noted    N&V (nausea and vomiting) [R11.2] 07/07/2021    A-fib ([address] [I48.91] 07/07/2020    HTN (hypertension) [I10]     CAD (coronary artery disease) [I25.10]     Hyperlipidemia [E78.5]        PLAN:      N/V - of unclear etiology. Given that is was present long prior to recent hernia surgical repair doubt significant post-procedural complication. CT scan reassuring w/out evidence of obstruction but w/ possible seroma/hematom favoured - doubt abscess clinically. Admission U/A negative. Barium swallow ordered and pending. Hx esophageal dilation in past.    HTN/CAD - w/ known CAD but no evidence of active signs/sxs of ischemia/failure. Currently controlled on home meds w/ vitals reviewed and documented as above. HyperLipidemia - controlled on home Statin. Continue, w/ f/u and med adjustment w/ PCP    Afib - chronic paroxysmal of unspecified and clinically unable to determine etiology. Normally rate controlled on BBlocker - continued. Anticoagulated at baseline on home Eliquis - continued. Monitored on tele. Azotemia - w/ elevated BUN/Cr ratio c/w pre-renal azotemia POArrival. Given IVF hydration and follow serial labs. Reviewed and documented as above. CHF - chronic systolic failure w/ reduced EF 20-25% by Echo dated June 2021. Likely due to ischemic heart disease. No evidence of acute decompensation. Continue current medical mgt. Tolerated initial IVF. Anemia - etiology clinically unable to determine, w/out evidence of active bleeding/hemolysis.  Asymptomatic w/out indication for transfusion. Follow serial labs. Reviewed and documented as above. HypoPhosphatemia - etiology clinically unable to determine. Follow serial labs and replace PRN. Reviewed and documented as above. DVT Prophylaxis: Eliquis  Diet: ADULT DIET; Regular  Adult Oral Nutrition Supplement; Standard High Calorie/High Protein Oral Supplement  Adult Oral Nutrition Supplement; Frozen Oral Supplement  Code Status: Full Code      PT/OT Eval Status: not yet ordered. Dispo - Possibly Friday/Sat 9/10 July pending clinical course. William Skaggs MD    Thank you NED Kuo - MARTIN for the opportunity to be involved in this patient's care. If you have any questions or concerns please feel free to contact me at [phone].

## 2021-07-08 NOTE — PLAN OF CARE
Problem: Falls - Risk of:  Goal: Will remain free from falls  Description: Will remain free from falls  7/8/2021 0018 by Pretty Ervin RN  Outcome: Ongoing  Note: Bed in lowest position, wheels locked, 2/4 side rails up, nonskid footwear on. Bed/ chair check alarm in place, call light within reach. Pt instructed to call out when needing assistance. Pt stated understanding. Nurse will continue to monitor.

## 2021-07-08 NOTE — CARE COORDINATION
[facility]    Referral received from  to follow for home care services. I will follow for needs, and speak with patient to verify demos.     Sunni Yan RN, BSN CTN  [facility] [phone]

## 2021-07-08 NOTE — CARE COORDINATION
CASE MANAGEMENT INITIAL ASSESSMENT      Reviewed chart and completed assessment via telephone with: Pt  Explained Case Management role/services. Primary contact information: Pt's son Kanslerinrinne 43 :   Primary Decision Maker: Donald Chago - Child - [phone]    Secondary Decision Maker: Sheralyn Duane Child - [phone]        Admit date/status: 7/7/21   Diagnosis: N/V   Is this a Readmission?:  Y pt was recently d/c from here with a SBO and went to [facility] here now for N&V     Insurance: Medicare   Precert required for SNF: N      3 night stay required: Y     Living arrangements, Adls, care needs, prior to admission: Pt lives home alone and states she is independent but she appears deconditioned and could benefit from PT/OT eval    Transportation: Family      Durable Medical Equipment at home:  Walker_X_Cane__RTS__ BSC__Shower Chair__  02__ HHN__ CPAP__  BiPap__  Hospital Bed__ W/C_X__ Other Grab Bars __________    Services in the home and/or outpatient, prior to admission: [address] (if applicable) N/A  · Name:  · Address:  · Dialysis Schedule:  · Phone:  · Fax:    PT/OT recs: Not yet ordered     Hospital Exemption Notification (HEN):N/A    Barriers to discharge:None     Plan/comments:7/8/21 Pt states she was active with [facility]. Pt just recently discharged from [facility]. Pt wants to go home with Igor . Has a walker and W/C at home. Son agrees with this plan.      ECOC on chart for MD signature

## 2021-07-09 NOTE — PROGRESS NOTES
Physician Progress Note      Adis Menchaca  [facility] #:                  [id number]  :                       1940  ADMIT DATE:       2021 9:12 AM  DISCH DATE:  RESPONDING  PROVIDER #:        Talon Howard MD          QUERY TEXT:    Pt admitted with \"N/V unclear etiology\". Dietary consult notes- \"  Severe   malnutrition\"   If possible, please document in progress notes and discharge   summary:      The medical record reflects the following:  Risk Factors: BMI 16.28, BMI, weight loss, poor intake prior to admission  Clinical Indicators: Dietary consult- -Severe malnutrition related to pain,   early satiety as evidenced by intake 26-50%, BMI, weight loss, poor intake   prior to admission, moderate loss of subcutaneous fat, moderate muscle loss        poor PO intake/appetite and weight loss, previously reported UBW of 121 lb   6 months ago, but son reported that it was a few years ago. Stable at 89 lb   for the past few months. Treatment: Dietary, GI consult, Food and/or Nutrient Delivery: Continue   Current Diet, Start Oral Nutrition Supplement, Continue Oral Nutrition   Supplement, Nutritional Goals: Consume 50% or greater of 3 meals per day and   ONS. I&O's, supportive care, serial labs. Options provided:  -- Severe malnutrition confirmed present on admission  -- Severe malnutrition  ruled out  -- Other - I will add my own diagnosis  -- Disagree - Not applicable / Not valid  -- Disagree - Clinically unable to determine / Unknown  -- Refer to Clinical Documentation Reviewer    PROVIDER RESPONSE TEXT:    Provider disagreed with this query.     Query created by: Lorna Guevara on 2021 2:31 PM      Electronically signed by:  Talon Howard MD 2021 2:33 PM

## 2021-07-09 NOTE — ACP (ADVANCE CARE PLANNING)
Advance Care Planning     Advance Care Planning Activator (Inpatient)  Conversation Note      Date of ACP Conversation: 7/9/2021     Conversation Conducted with: Patient with Decision Making Capacity    ACP Activator: Bessie Sever, RN    Health Care Decision Maker:     Current Designated Health Care Decision Maker:     Primary Decision Maker: Ad Haas - Child - [phone]    Secondary Decision Maker: Jennifer Velázquez - Child - [phone]    Supplemental (Other) Decision Maker: [place] Colt - Child - [phone] Preferences    Ventilation: \"If you were in your present state of health and suddenly became very ill and were unable to breathe on your own, what would your preference be about the use of a ventilator (breathing machine) if it were available to you? \"      Would the patient desire the use of ventilator (breathing machine)?: no    \"If your health worsens and it becomes clear that your chance of recovery is unlikely, what would your preference be about the use of a ventilator (breathing machine) if it were available to you? \"     Would the patient desire the use of ventilator (breathing machine)?: No      Resuscitation  \"CPR works best to restart the heart when there is a sudden event, like a heart attack, in someone who is otherwise healthy. Unfortunately, CPR does not typically restart the heart for people who have serious health conditions or who are very sick. \"    \"In the event your heart stopped as a result of an underlying serious health condition, would you want attempts to be made to restart your heart (answer \"yes\" for attempt to resuscitate) or would you prefer a natural death (answer \"no\" for do not attempt to resuscitate)? \" no       [x] Yes   [] No   Educated Patient / Florene Billing regarding differences between Advance Directives and portable DNR orders. Writer went to bedside to discuss the above.   Per patient she does not wish to have any resuscitation should any life altering events take place. Writer also discussed possibility of feeding tube and pt does NOT wish to have feeding tube placed. Pt stated there was a living will on file however when chart review was placed CM only finds a last will and testimate not a living will. Sticky note left for MD to address codes status to reflect the above.  Joshua Cheney RN

## 2021-07-09 NOTE — PROGRESS NOTES
Hospitalist Progress Note      PCP: NED Galvez - CNP    Date of Admission: 7/7/2021    Chief Complaint: N/V    Subjective: no new c/o. Medications:  Reviewed    Infusion Medications    sodium chloride       Scheduled Medications    sodium chloride flush  5-40 mL Intravenous 2 times per day    apixaban  2.5 mg Oral BID    clopidogrel  75 mg Oral Daily    metoprolol succinate  100 mg Oral Daily    metoprolol succinate  25 mg Oral Nightly    spironolactone  25 mg Oral Daily    atorvastatin  20 mg Oral Nightly     PRN Meds: LORazepam, ondansetron, sodium chloride flush, sodium chloride, ondansetron **OR** ondansetron, polyethylene glycol, acetaminophen **OR** acetaminophen, promethazine      Intake/Output Summary (Last 24 hours) at 7/9/2021 0814  Last data filed at 7/8/2021 1810  Gross per 24 hour   Intake 320 ml   Output --   Net 320 ml       Physical Exam Performed:    BP (!) 153/94   Pulse 92   Temp 98.7 °F (37.1 °C) (Oral)   Resp 22   Ht 5' 2\" (1.575 m)   Wt 89 lb (40.4 kg)   SpO2 93%   BMI 16.28 kg/m²     General appearance: No apparent distress, appears stated age and cooperative. HEENT: Pupils equal, round, and reactive to light. Conjunctivae/corneas clear. Neck: Supple, with full range of motion. No jugular venous distention. Trachea midline. Respiratory:  Normal respiratory effort. Clear to auscultation, bilaterally without Rales/Wheezes/Rhonchi. Cardiovascular: Regular rate and rhythm with normal S1/S2 without murmurs, rubs or gallops. Abdomen: Soft, non-tender, non-distended with normal bowel sounds. Musculoskeletal: No clubbing, cyanosis or edema bilaterally. Full range of motion without deformity. Skin: Skin color, texture, turgor normal.  No rashes or lesions. Neurologic:  Neurovascularly intact without any focal sensory/motor deficits.  Cranial nerves: II-XII intact, grossly non-focal.  Psychiatric: Alert and oriented, thought content appropriate, normal insight  Capillary Refill: Brisk,< 3 seconds   Peripheral Pulses: +2 palpable, equal bilaterally       Labs:   Recent Labs     07/07/21  0923 07/08/21  0546 07/09/21  0723   WBC 7.1 4.5 6.2   HGB 13.3 11.4* 12.8   HCT 38.6 33.1* 37.5    220 231     Recent Labs     07/07/21  0923 07/08/21  0546 07/09/21  0723    138 138   K 3.8 3.6 4.0    105 102   CO2 28 29 27   BUN 26* 15 19   CREATININE 0.9 0.7 0.7   CALCIUM 10.6 9.4 10.1   PHOS  --  2.4* 2.2*     Recent Labs     07/07/21 0923   AST 17   ALT 8*   BILITOT 0.4   ALKPHOS 94     No results for input(s): INR in the last 72 hours. Recent Labs     07/07/21 0923   TROPONINI 0.02*       Urinalysis:      Lab Results   Component Value Date    NITRU Negative 07/07/2021    WBCUA 0-2 06/06/2021    BACTERIA 4+ 07/07/2020    RBCUA 0-2 06/06/2021    BLOODU Negative 07/07/2021    SPECGRAV 1.010 07/07/2021    GLUCOSEU Negative 07/07/2021       Consults:    IP CONSULT TO HOSPITALIST  IP CONSULT TO SOCIAL WORK      Assessment/Plan:    Active Hospital Problems    Diagnosis     N&V (nausea and vomiting) [R11.2]     A-fib ([address] [I48.91]     HTN (hypertension) [I10]     CAD (coronary artery disease) [I25.10]     Hyperlipidemia [E78.5]        N/V - of unclear etiology. Given that is was present long prior to recent hernia surgical repair doubt significant post-procedural complication. CT scan reassuring w/out evidence of obstruction but w/ possible seroma/hematom favoured - doubt abscess clinically. Admission U/A negative. Barium swallow demonstrating luminal narrowing. Hx esophageal dilation in past w/ Dr. Melissa Guevara - consulted and appreciated in advance but in reality is tolerating liquids and can certainly consider outpt f/u. Currently NPO pending GI recs.     HTN/CAD - w/ known CAD but no evidence of active signs/sxs of ischemia/failure.  Currently controlled on home meds w/ vitals reviewed and documented as above.     HyperLipidemia - controlled on home Statin. Continue, w/ f/u and med adjustment w/ PCP     Afib - chronic paroxysmal of unspecified and clinically unable to determine etiology. Normally rate controlled on BBlocker - continued. Anticoagulated at baseline on home Eliquis - continued. Monitored on tele.      Azotemia - w/ elevated BUN/Cr ratio c/w pre-renal azotemia POArrival. Given IVF hydration and follow serial labs. Reviewed and documented as above.     CHF - chronic systolic failure w/ reduced EF 20-25% by Echo dated June 2021. Likely due to ischemic heart disease. No evidence of acute decompensation. Continue current medical mgt. Tolerated initial IVF.      Anemia - etiology clinically unable to determine, w/out evidence of active bleeding/hemolysis. Stable and asymptomatic w/out indication for transfusion. Follow serial labs. Reviewed and documented as above.     HypoPhosphatemia - etiology clinically unable to determine. Follow serial labs and replace PRN - ordered IV 9 July. Reviewed and documented as above.       DVT Prophylaxis: Eliquis    Recent Labs     07/07/21  0923 07/08/21  0546 07/09/21  0723    220 231     Diet: Diet NPO Exceptions are: Sips of Water with Meds  Code Status: Full Code    PT/OT Eval Status: not yet ordered.      Dispo - Possibly Friday/Sat 9/10 July pending clinical course and GI recs. Discussed via telecon w/ son Bobby Don 8 July PM and again at the bedside AM 9 July prior to results.      William Skaggs MD

## 2021-07-10 NOTE — PROGRESS NOTES
Occupational Therapy   Occupational Therapy Initial Assessment/Treatment  Date: 7/10/2021   Patient Name: Pennie Long  MRN: [id number]     : 1940    Date of Service: 7/10/2021    Discharge Recommendations:  24 hour supervision or assist, Home with Home health OT  OT Equipment Recommendations  Equipment Needed: No    Assessment   Performance deficits / Impairments: Decreased functional mobility ; Decreased ADL status; Decreased safe awareness;Decreased endurance;Decreased posture    Assessment: Pt is an 79yo female with deficits in the areas listed above. Pt required CGA-SBA with RW for functional mobility and t/fs today. Pt required increased time and vc's for safety and energy conservation throughout session today for functional mobility and t/fs. Pt declining most ADLs today. Pt should continue to receive skilled OT services to increase endurance for ADLS and functional mobility. Prognosis: Good  Decision Making: Medium Complexity  OT Education: OT Role;Plan of Care;Transfer Training;Energy Conservation  Patient Education: disease specific: using energy conservation during activity, importance of slowly building up endurance, general safety during hospitalization, safe mobility and t/fs. Pt verbalized understanding. REQUIRES OT FOLLOW UP: Yes  Activity Tolerance  Activity Tolerance: Patient Tolerated treatment well  Activity Tolerance: Pthaving SOB during functional mobility. Pt vitals during activity: HR 84, O2 97%. Prior to activity: 125/93, HR 89, O2 97%  Safety Devices  Safety Devices in place: Yes  Type of devices: Call light within reach; Left in chair;Nurse notified;Gait belt (No alarm upon entry.)           Patient Diagnosis(es): The primary encounter diagnosis was Failure to thrive in adult. A diagnosis of Nausea was also pertinent to this visit.      has a past medical history of CAD (coronary artery disease), Cancer ([facility] [address] Carotid artery disease ([facility] [address] HTN (hypertension), Hyperlipemia, Hyperlipidemia, LBP (low back pain), Lung nodules, Osteoporosis, Seasonal allergies, Stress bladder incontinence, female, Unspecified cerebral artery occlusion with cerebral infarction, and Wears glasses. has a past surgical history that includes Varicose vein surgery; Cardiac catheterization; Colonoscopy (8/13/2014); and hernia repair (Right, 6/9/2021). Restrictions  Restrictions/Precautions  Restrictions/Precautions: General Precautions, Fall Risk, Up as Tolerated  Position Activity Restriction  Other position/activity restrictions: NPO. Subjective   General  Chart Reviewed: Yes  Patient assessed for rehabilitation services?: Yes  Additional Pertinent Hx: Poor intake  Response to previous treatment: Patient with no complaints from previous session  Family / Caregiver Present: No  Referring Practitioner: Elana Lopez MD  Diagnosis: N/V unclear etiology  Subjective  Subjective: Pt agreeable to therapy. General Comment  Comments: RN approved therapy.   Patient Currently in Pain: Denies  Vital Signs  Pulse: 89  Heart Rate Source: Monitor  BP: (!) 125/93  BP Location: Right upper arm  Patient Position: Sitting  Level of Consciousness: Alert (0)  Patient Currently in Pain: Denies  Oxygen Therapy  SpO2: 97 %  Pulse Oximeter Device Mode: Intermittent  Pulse Oximeter Device Location: Finger  O2 Device: None (Room air)  Social/Functional History  Social/Functional History  Lives With: Alone  Type of Home: House  Home Layout: Two level, Able to Live on Main level with bedroom/bathroom  Home Access: Stairs to enter with rails  Entrance Stairs - Number of Steps: 2 BULL  Entrance Stairs - Rails: Left  Bathroom Shower/Tub: Walk-in shower  Bathroom Toilet: Standard  Bathroom Equipment: Grab bars in shower, Shower chair, Grab bars around toilet  Home Equipment: Cane, Wheelchair-manual, Rolling walker  ADL Assistance: Independent  Homemaking Assistance: Needs assistance (Unable to clean the house fully; does not have assist available; son takes care of yard. )Patient said that her son and daughter are able to assist somewhat but they cannot help all of the time)  Homemaking Responsibilities: No (Unable to clean the house fully; does not have assist available; son takes care of yard)  Ambulation Assistance: Independent (RW or cane; reporting walker is better.)  Transfer Assistance: Independent  Active : Yes  Additional Comments: No falls in the past 6 months. Patient said that prior to the beginning of June she was completely independent. She said that since then she has been becoming progressively weaker. Objective   Vision: Impaired  Vision Exceptions: Wears glasses at all times; Cataracts  Hearing: Exceptions to [facility]  Hearing Exceptions: Hard of hearing/hearing concerns    Orientation  Overall Orientation Status: Within Functional Limits  Observation/Palpation  Posture: Fair  Balance  Sitting Balance: Modified independent  (Pt seated in chair.)  Standing Balance: Contact guard assistance (CGA progressing to SBA.)  Standing Balance  Time: ~5min, ~4min  Activity: Functional mobility in virgen and up/down steps to increase endurance and prepair for household and community distances. Comment: RW used. Functional Mobility  Functional - Mobility Device: Rolling Walker  Activity: Other (Functional mobility in virgen and up/down steps to increase endurance and prepair for household and community distances.)  Assist Level: Contact guard assistance (CGA progressing to SBA)  Functional Mobility Comments: verbal cues for RW use, posture, picking up feet and energy conversation throughout mobility. Pt required vc's for rest breaks and experienced SOB during activity.   ADL  Grooming: Setup (Pt wiping nose with set up.)  Tone RUE  RUE Tone: Normotonic  Tone LUE  LUE Tone: Normotonic  Coordination  Movements Are Fluid And Coordinated: Yes     Bed mobility  Supine to Sit: Unable to assess  Sit to Supine: Unable to assess  Scooting: Unable to assess  Comment: Pt started and ended session seated in chair. Transfers  Sit to stand: Stand by assistance  Stand to sit: Stand by assistance  Transfer Comments: with RW and vc's for safety and hand placement. Cognition  Overall Cognitive Status: Exceptions  Arousal/Alertness: Appropriate responses to stimuli  Following Commands: Follows all commands without difficulty  Attention Span: Appears intact  Memory: Appears intact  Safety Judgement: Decreased awareness of need for assistance;Decreased awareness of need for safety  Problem Solving: Decreased awareness of errors  Insights: Decreased awareness of deficits  Initiation: Does not require cues (requires cues for some termination of activities.)  Sequencing: Does not require cues  Cognition Comment: Pt becoming upset during session d/t not having answers about medical problems and lack of food. OT using therapuetic use of self to comfort pt. Sensation  Overall Sensation Status: WFL        LUE AROM (degrees)  LUE AROM : WFL  Left Hand AROM (degrees)  Left Hand AROM: WFL  RUE AROM (degrees)  RUE AROM : WFL  Right Hand AROM (degrees)  Right Hand AROM: WFL  LUE Strength  Gross LUE Strength: WFL  L Hand General: 3+/5  RUE Strength  Gross RUE Strength: WFL  R Hand General: 3+/5                   Plan   Plan  Times per week: 3-5x/week  Current Treatment Recommendations: Strengthening, Balance Training, Functional Mobility Training, Endurance Training, Safety Education & Training, Patient/Caregiver Education & Training, Self-Care / ADL, Home Management Training      AM-PAC Score        AM-[facility] Inpatient Daily Activity Raw Score: 20 (07/10/21 1241)  AM-PAC Inpatient ADL T-Scale Score : 42.03 (07/10/21 1241)  ADL Inpatient CMS 0-100% Score: 38.32 (07/10/21 1241)  ADL Inpatient CMS G-Code Modifier : Yesenia Calderon (07/10/21 1241)    Goals  Short term goals  Time Frame for Short term goals: 1 week (7/16) unless stated otherwise.   Short term goal 1: Pt will LB dress with supervision. Short term goal 2: Pt will toilet with supervision and AD PRN. Short term goal 3: Pt will perform BUE ther ex x20 to increase strength, endurance and improve posture for ADLS and functional mobility (7/14)  Short term goal 4: Pt will perform 2-3 ADLS in stance with SBA and AD PRN. Patient Goals   Patient goals : Miranda Wyman figure out what is wrong with me\" \"to get stronger\"       Therapy Time   Individual Concurrent Group Co-treatment   Time In 1143         Time Out 1218         Minutes 35         Timed Code Treatment Minutes: 25 Minutes (10 min eval.)       Jf Benitez, OTR/L  If pt discharges prior to next session, this note will serve as discharge summary. See case management note for discharge disposition.

## 2021-07-10 NOTE — CONSULTS
Gastroenterology Consult Note    Patient:   Benita Lopez   :    1940   Facility:   [facility]   Referring/PCP: Reny Cox, APRN - CNP  Date:     7/10/2021  Consultant:   Albino Ferrera MD, MD      Chief Complaint   Patient presents with    Anorexia     son made her come because she can't eat, pt states she has been nauseated for a year        History of Present illness     78year old female with history of HTN, HLD, CAD, CVA, A fib, osteoporosis, low back pain, esophageal dysmotility and PVD s/p recent celiac artery stent placement (5/10/21) presented to ER with complaints of nausea and weight loss. She has chronic nausea for several months. She used to weigh around 120lb and is now down to 89lb. She denies any dysphagia, heartburn, hematemesis, vomiting, abdominal pain, diarrhea or bleeding. She has had extensive workup in the past year including EGD in 2021. She was found to have presbyesophagus which was empirically dilated with 60F bryson dilator. Her duodenal biopsies were negative for celiac disease. 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 She subsequently had angiogram with angioplasty and stent placement of celiac artery.  She was recently hospitalized a month ago for SBO secondary to incarcerated femoral hernia which was surgically reduced    Past Medical History:   Diagnosis Date    CAD (coronary artery disease)     Cancer ([address] 2016    skin cancer    Carotid artery disease ([address]     HTN (hypertension)     Hyperlipemia     Hyperlipidemia     LBP (low back pain)     Lung nodules     Osteoporosis     Seasonal allergies     Stress bladder incontinence, female     Unspecified cerebral artery occlusion with cerebral infarction     Wears glasses      Past Surgical History:   Procedure Laterality Date    CARDIAC CATHETERIZATION      COLONOSCOPY  2014    HERNIA REPAIR Right 2021    ROBOTIC RIGHT INGUINAL HERNIA REPAIR performed by Stacy Batres MD at [address]         Social:   Social History     Tobacco Use    Smoking status: Never Smoker    Smokeless tobacco: Never Used   Substance Use Topics    Alcohol use: No     Family:   Family History   Problem Relation Age of Onset    Heart Disease Sister     Stroke Mother      No current facility-administered medications on file prior to encounter.      Current Outpatient Medications on File Prior to Encounter   Medication Sig Dispense Refill    PARoxetine (PAXIL) 20 MG tablet Take 1 tablet by mouth daily 30 tablet 1    metoprolol succinate (TOPROL XL) 25 MG extended release tablet Take 1 tablet by mouth nightly 30 tablet 3    clopidogrel (PLAVIX) 75 MG tablet Take 75 mg by mouth daily      simvastatin (ZOCOR) 20 MG tablet TAKE ONE TABLET BY MOUTH DAILY 90 tablet 3    metoprolol succinate (TOPROL XL) 100 MG extended release tablet Take 1 tablet by mouth daily 90 tablet 3    spironolactone (ALDACTONE) 25 MG tablet Take 25 mg by mouth daily      apixaban (ELIQUIS) 2.5 MG TABS tablet Take 1 tablet by mouth 2 times daily 60 tablet 0    acetaminophen (TYLENOL) 500 MG tablet Take 500 mg by mouth every 6 hours as needed for Pain      glucosamine-chondroitin 500-400 MG tablet Take by mouth 2 times daily       magnesium (MAGNESIUM-OXIDE) 250 MG TABS tablet Take 250 mg by mouth daily       Coenzyme Q10 (COQ10) 50 MG CAPS Take by mouth 2 times daily       vitamin D 25 MCG (1000 UT) CAPS Take 1,000 Units by mouth daily        aspirin 81 MG chewable tablet Take 325 mg by mouth daily        therapeutic multivitamin-minerals (THERAGRAN-M) tablet Take 1 tablet by mouth daily        vitamin E 400 UNIT capsule Take 400 Units by mouth daily        Calcium Citrate-Vitamin D 200-250 MG-UNIT TABS Take by mouth        Ascorbic Acid (VITAMIN C) 500 MG tablet Take 1,000 mg by mouth daily        folic acid (FOLVITE) 1 MG tablet Take 400 mcg by mouth daily  Zinc 50 MG CAPS Take by mouth          Infusions:    sodium chloride       PRN Medications: LORazepam, ondansetron, sodium chloride flush, sodium chloride, ondansetron **OR** ondansetron, polyethylene glycol, acetaminophen **OR** acetaminophen, promethazine  Allergies: Allergies   Allergen Reactions    Pneumococcal Vaccines        ROS: Constitutional: negative for chills, fevers and sweats  Eyes: negative for cataracts, icterus and redness  Ears, nose, mouth, throat, and face: negative for epistaxis, hearing loss and sore throat  Respiratory: negative for cough, hemoptysis and sputum  Cardiovascular: negative for chest pain, dyspnea and lower extremity edema  Gastrointestinal: as per HPI  Genitourinary:negative for dysuria, frequency and hematuria  Neurological: negative for coordination problems, dizziness and gait problems  Behavioral/Psych: negative for anxiety, depression and mood swings    Physical Exam   BP (!) 156/89   Pulse 80   Temp 97.9 °F (36.6 °C) (Oral)   Resp 16   Ht 5' 2\" (1.575 m)   Wt 89 lb (40.4 kg)   SpO2 95%   BMI 16.28 kg/m²     General appearance: alert, appears older than stated age, cachectic and mild distress  Head: Normocephalic, without obvious abnormality, atraumatic  Eyes: conjunctivae/corneas clear. PERRL, EOM's intact. Fundi benign.   Neck: no adenopathy and supple, symmetrical, trachea midline  Lungs: clear to auscultation bilaterally  Heart: regular rate and rhythm, S1, S2 normal, no murmur, click, rub or gallop  Abdomen: soft, non-tender; bowel sounds normal; no masses,  no organomegaly  Extremities: extremities normal, atraumatic, no cyanosis or edema    Lab and Imaging Review   Labs:  CBC:   Recent Labs     07/08/21  0546 07/09/21  0723 07/10/21  0611   WBC 4.5 6.2 5.5   HGB 11.4* 12.8 12.2   HCT 33.1* 37.5 35.7*   MCV 92.5 92.9 91.5    231 241     BMP:   Recent Labs     07/08/21  0546 07/09/21  0723 07/10/21  0611    138 135*   K 3.6 4.0 3.8    102 100   CO2 29 27 26   PHOS 2.4* 2.2* 2.9   BUN 15 19 13   CREATININE 0.7 0.7 0.7     CT abd/pelvis:    1. Small nonspecific right lower quadrant rim enhancing fluid collection. The differential includes seroma, hematoma and abscess. 2. Diverticulosis. 3. Mild bibasilar atelectasis versus pneumonia. Barium esophagram:    1. Short segmental area of marked luminal narrowing of the proximal esophagus   near the level of the thoracic inlet.  Exact etiology of the finding is   indeterminate.  Follow-up endoscopy is recommended for a more complete   evaluation. 2. Esophageal dysmotility with disordered primary peristalsis and   visualization of tertiary contractions as described. 3. Mild nonspecific smooth luminal narrowing of the distal esophagus. Finding may in part be related to esophageal dysmotility although true   luminal narrowing including changes of subtle stricture involving the distal   esophagus also in the differential diagnosis. Assessment:     78year old female with history of HTN, HLD, CAD, CVA, A fib, osteoporosis, low back pain, esophageal dysmotility and PVD s/p recent celiac artery stent placement (5/10/21) admitted with anorexia and failure to thrive. Plan:   1. Continue supportive care  2. Continue PPI daily  3. Encourage nutrition with supplements  4. Speech therapy and nutritionist consults  5. EGD with dilation tomorrow  6.  Francine Zambrano today    Vadim Robert MD, MD  10:21 AM 7/10/2021

## 2021-07-10 NOTE — PROGRESS NOTES
Physical Therapy    Facility/Department: [facility] C5 - MED SURG/ORTHO  Initial Assessment and Treatment    NAME: Jose Milner  : 1940  MRN: [id number]    Date of Service: 7/10/2021    Discharge Recommendations:  Home with assist PRN, Home with Home health PT   PT Equipment Recommendations  Equipment Needed: No  Other: patient said that she has a rolling walker at home. If pt is unable to be seen after this session, please let this note serve as discharge summary. Please see case management note for discharge disposition. Thank you. Assessment   Body structures, Functions, Activity limitations: Decreased functional mobility ; Decreased endurance;Decreased strength  Assessment: Patient is an 78year old female who was admitted to [facility] on 21 with nausea & vomiting. Patient is normally independent with mobility. Today the patient completed transfers with SBA and ambulated with SBA & a RW. Patient also ascended 3 steps with rails and SBA. Patient presented with the therapy deficits listed above. PT recommends that this patient receive home PT to address these deficits. Patient is safe for home, when medically stable, with PRN supervision/assistance and continued use of her rolling walker. PT to continue to follow. Treatment Diagnosis: decreased independence with functional mobility. Specific instructions for Next Treatment: progress mobility as tolerated  Prognosis: Good  Decision Making: Medium Complexity  PT Education: Goals;Pressure Relief;PT Role;Transfer Training;Plan of Care;Equipment;Gait Training; Injury Prevention; Energy Conservation;General Safety; Disease Specific Education  Patient Education: Disease Specific Education: Patient educated on importance of OOB mobility, prevention of complications of bedrest, and general safety during hospitalization.  Patient verbalized understanding  Barriers to Learning: none  REQUIRES PT FOLLOW UP: Yes  Activity Tolerance  Activity Tolerance: Patient Tolerated treatment well  Activity Tolerance: Vitals: 125/93 96% room air 87 BPM. Post activity: 97% room air. 90 BPM       Patient Diagnosis(es): The primary encounter diagnosis was Failure to thrive in adult. A diagnosis of Nausea was also pertinent to this visit. has a past medical history of CAD (coronary artery disease), Cancer ([facility] [address] Carotid artery disease ([facility] [address] HTN (hypertension), Hyperlipemia, Hyperlipidemia, LBP (low back pain), Lung nodules, Osteoporosis, Seasonal allergies, Stress bladder incontinence, female, Unspecified cerebral artery occlusion with cerebral infarction, and Wears glasses. has a past surgical history that includes Varicose vein surgery; Cardiac catheterization; Colonoscopy (8/13/2014); and hernia repair (Right, 6/9/2021). Restrictions  Restrictions/Precautions  Restrictions/Precautions: General Precautions, Fall Risk, Up as Tolerated  Position Activity Restriction  Other position/activity restrictions: NPO. Vision/Hearing  Vision: Impaired  Vision Exceptions: Wears glasses at all times; Cataracts  Hearing: Exceptions to [facility]  Hearing Exceptions: Hard of hearing/hearing concerns     Subjective  General  Chart Reviewed: Yes  Patient assessed for rehabilitation services?: Yes  Additional Pertinent Hx: HPI per chart, \"78 y.o. female s/p recent SBO w/ incarceration having underwent R femoral hernia repair Early June 2021 who presented to [facility] with progressive N/V since surgery, although it had been present for the past year. Sxs worse over the last week unable to tolerate adequate PO. CT scan reassuring w/out evidence of obstruction but w/ possible seroma/hematom favoured - doubt abscess clinically. \"  Response To Previous Treatment: Not applicable  Family / Caregiver Present: No  Referring Practitioner: Angelina Wolfe MD  Referral Date : 07/09/21  Follows Commands: Within Functional Limits  General Comment  Comments: Seated in chair upon entry of therapy.  Cleared for therapy by RN.  Subjective  Subjective: Patient agreed to participate. Pain Screening  Patient Currently in Pain: Denies  Vital Signs  Patient Currently in Pain: Denies       Orientation     Social/Functional History  Social/Functional History  Lives With: Alone  Type of Home: House  Home Layout: Two level, Able to Live on Main level with bedroom/bathroom  Home Access: Stairs to enter with rails  Entrance Stairs - Number of Steps: 2 BULL  Entrance Stairs - Rails: Left  Bathroom Shower/Tub: Walk-in shower  Bathroom Toilet: Standard  Bathroom Equipment: Grab bars in shower, Shower chair, Grab bars around toilet  Home Equipment: Cane, Wheelchair-manual, Rolling walker  ADL Assistance: Independent  Homemaking Assistance: Needs assistance (Unable to clean the house fully; does not have assist available; son takes care of yard. )Patient said that her son and daughter are able to assist somewhat but they cannot help all of the time)  Homemaking Responsibilities: No (Unable to clean the house fully; does not have assist available; son takes care of yard)  Ambulation Assistance: Independent (RW or cane; reporting walker is better.)  Transfer Assistance: Independent  Active : Yes  Additional Comments: No falls in the past 6 months. Patient said that prior to the beginning of June she was completely independent. She said that since then she has been becoming progressively weaker. Cognition   Cognition  Overall Cognitive Status: Exceptions  Arousal/Alertness: Appropriate responses to stimuli  Following Commands:  Follows all commands without difficulty  Attention Span: Appears intact  Memory: Appears intact  Safety Judgement: Decreased awareness of need for assistance;Decreased awareness of need for safety  Problem Solving: Decreased awareness of errors  Insights: Decreased awareness of deficits  Initiation: Does not require cues  Sequencing: Does not require cues  Cognition Comment: Patient frequently needed cueing regarding importance of energy conservation. She also became upset at end of session due to having difficulty with obtaining food (due to her recent NPO status). Emotional support was given to patient. Objective     Observation/Palpation  Posture: Poor  Observation: significant kyphotic posture, rounded shoulders. PROM RLE (degrees)  RLE PROM: WFL  AROM RLE (degrees)  RLE AROM: WFL  PROM LLE (degrees)  LLE PROM: WFL  AROM LLE (degrees)  LLE AROM : WFL  Strength RLE  Comment: grossly 4/5 strength bilaterally. Strength LLE  Comment: grossly 4/5 strength bilaterally. Bed mobility  Supine to Sit: Unable to assess  Sit to Supine: Unable to assess  Scooting: Unable to assess  Comment: patient in chair at beginning and end of therapy evaluation  Transfers  Sit to Stand: Stand by assistance  Stand to sit: Stand by assistance  Bed to Chair: Stand by assistance  Comment: with RW  Ambulation  Ambulation?: Yes  More Ambulation?: No  Ambulation 1  Surface: level tile  Device: Rolling Walker  Assistance: Stand by assistance  Quality of Gait: significant kyphotic posture/rounded shoulders/forward head posture, cueing to correct. decreased bilateral step height/step length. cueing to increase bilateral knee/hip flexion to improve/compensate. cueing for RW placement (patient tends to abandon her RW just prior to sitting down. Gait Deviations: Slow Cheri;Decreased step length;Decreased step height  Distance: 30 feet. 100 feet. 20 feet. 110 feet. Comments: No complaints of shortness of breath, chest pain or dizziness. No loss of balance. Stairs/Curb  Stairs?: Yes  Stairs  # Steps : 3  Stairs Height: 6\"  Rails: Bilateral  Device: No Device  Assistance: Stand by assistance  Comment: No complaints of shortness of breath, chest pain or dizziness. Safe non-reciprocal step pattern. No loss of balance.      Balance  Posture: Poor  Sitting - Static: Good  Sitting - Dynamic: Good  Standing - Static: Good  Standing - Dynamic:

## 2021-07-10 NOTE — PROGRESS NOTES
nerves: II-XII intact, grossly non-focal.  Psychiatric: Alert and oriented, thought content appropriate, normal insight  Capillary Refill: Brisk,< 3 seconds   Peripheral Pulses: +2 palpable, equal bilaterally       Labs:   Recent Labs     07/08/21  0546 07/09/21  0723 07/10/21  0611   WBC 4.5 6.2 5.5   HGB 11.4* 12.8 12.2   HCT 33.1* 37.5 35.7*    231 241     Recent Labs     07/08/21  0546 07/09/21  0723 07/10/21  0611    138 135*   K 3.6 4.0 3.8    102 100   CO2 29 27 26   BUN 15 19 13   CREATININE 0.7 0.7 0.7   CALCIUM 9.4 10.1 9.6   PHOS 2.4* 2.2* 2.9     No results for input(s): AST, ALT, BILIDIR, BILITOT, ALKPHOS in the last 72 hours. No results for input(s): INR in the last 72 hours. No results for input(s): Miracle Lowers in the last 72 hours. Urinalysis:      Lab Results   Component Value Date    NITRU Negative 07/07/2021    WBCUA 0-2 06/06/2021    BACTERIA 4+ 07/07/2020    RBCUA 0-2 06/06/2021    BLOODU Negative 07/07/2021    SPECGRAV 1.010 07/07/2021    GLUCOSEU Negative 07/07/2021       Consults:    IP CONSULT TO HOSPITALIST  IP CONSULT TO SOCIAL WORK  IP CONSULT TO GI      Assessment/Plan:    Active Hospital Problems    Diagnosis     N&V (nausea and vomiting) [R11.2]     A-fib ([address] [I48.91]     HTN (hypertension) [I10]     CAD (coronary artery disease) [I25.10]     Hyperlipidemia [E78.5]        N/V - of unclear etiology. Given that is was present long prior to recent hernia surgical repair doubt significant post-procedural complication. CT scan reassuring w/out evidence of obstruction but w/ possible seroma/hematom favoured - doubt abscess clinically. Admission U/A negative. Possibly  2/2 constipation  GI on board  Plan for EGD tomorrow   Aggressive bowel regimen     Barium swallow demonstrating luminal narrowing and esophageal dysmotility with disordered primary peristalsis and visualization of tertiary contractions as described.   Hx esophageal dilation in past w/  Mohit   GI on board  Plan for EGD tomorrow      HTN/CAD - w/ known CAD but no evidence of active signs/sxs of ischemia/failure. Currently controlled on home meds w/ vitals reviewed and documented as above.     HyperLipidemia - controlled on home Statin. Continue, w/ f/u and med adjustment w/ PCP     Afib - chronic paroxysmal of unspecified and clinically unable to determine etiology. Normally rate controlled on BBlocker - continued. Anticoagulated at baseline on home Eliquis - continued. Monitored on tele.      Azotemia - w/ elevated BUN/Cr ratio c/w pre-renal azotemia POArrival. Given IVF hydration and follow serial labs. Reviewed and documented as above.     CHF - chronic systolic failure w/ reduced EF 20-25% by Echo dated June 2021. Likely due to ischemic heart disease. No evidence of acute decompensation. Continue current medical mgt. Tolerated initial IVF.      Anemia - etiology clinically unable to determine, w/out evidence of active bleeding/hemolysis. Stable and asymptomatic w/out indication for transfusion. Follow serial labs. Reviewed and documented as above.     HypoPhosphatemia - etiology clinically unable to determine. Follow serial labs and replace PRN - ordered IV 9 July. Reviewed and documented as above.       DVT Prophylaxis: Eliquis    Recent Labs     07/08/21  0546 07/09/21  0723 07/10/21  0611    231 241     Diet: Diet NPO Exceptions are: Sips of Water with Meds  ADULT DIET; Regular; GI Toombs (GERD/Peptic Ulcer)  Code Status: Full Code    PT/OT Eval Status: not yet ordered.       Dallin Wiley MD

## 2021-07-11 NOTE — H&P
History and Physical / Pre-Sedation Assessment    Patient:  Carlos Pulido   :   1940     Intended Procedure:  EGD    HPI: 78year old female with history of HTN, HLD, CAD, CVA, A fib, osteoporosis, low back pain, esophageal dysmotility and PVD s/p recent celiac artery stent placement (5/10/21) admitted with anorexia and failure to thrive    Past Medical History:   Diagnosis Date    CAD (coronary artery disease)     Cancer ([facility] 2016    skin cancer    Carotid artery disease ([facility]     HTN (hypertension)     Hyperlipemia     Hyperlipidemia     LBP (low back pain)     Lung nodules     Osteoporosis     Seasonal allergies     Stress bladder incontinence, female     Unspecified cerebral artery occlusion with cerebral infarction     Wears glasses      Past Surgical History:   Procedure Laterality Date    CARDIAC CATHETERIZATION      COLONOSCOPY  2014    HERNIA REPAIR Right 2021    ROBOTIC RIGHT INGUINAL HERNIA REPAIR performed by Stacy Batres MD at [address]         Medications reviewed  Prior to Admission medications    Medication Sig Start Date End Date Taking?  Authorizing Provider   PARoxetine (PAXIL) 20 MG tablet Take 1 tablet by mouth daily 21  Yes NED Prasad CNP   metoprolol succinate (TOPROL XL) 25 MG extended release tablet Take 1 tablet by mouth nightly 21  Yes Marion Amato MD   clopidogrel (PLAVIX) 75 MG tablet Take 75 mg by mouth daily   Yes Historical Provider, MD   simvastatin (ZOCOR) 20 MG tablet TAKE ONE TABLET BY MOUTH DAILY 21  Yes NED Prasad CNP   metoprolol succinate (TOPROL XL) 100 MG extended release tablet Take 1 tablet by mouth daily 21  Yes NED Manuel CNP   spironolactone (ALDACTONE) 25 MG tablet Take 25 mg by mouth daily 20  Yes Historical Provider, MD   apixaban (ELIQUIS) 2.5 MG TABS tablet Take 1 tablet by mouth 2 times daily 20  Yes Adrian Lopez MD   acetaminophen plan.    I have explained the risk, benefits, and alternatives to the procedure; the patient understands and agrees to proceed.        Olivia Angeles MD  7/11/2021

## 2021-07-11 NOTE — BRIEF OP NOTE
Brief Postoperative Note      Patient: Sim Miller  YOB: 1940  MRN: [id number]    Date of Procedure: 7/11/2021    Pre-Op Diagnosis: Nausea and vomiting    Post-Op Diagnosis: mild gastritis, presbyesophagus s/p savory dilation 57F, otherwise normal EGD       Procedure(s):  EGD BIOPSY  EGD DILATION LISA    Surgeon(s):  Jenelle Roberts MD    Assistant:  * No surgical staff found *    Anesthesia: IV Sedation    Estimated Blood Loss (mL): Minimal    Complications: None    Specimens:   ID Type Source Tests Collected by Time Destination   A :  Tissue Tissue SURGICAL PATHOLOGY Jenelle Roberts MD 7/11/2021 4045        Implants:  * No implants in log *      Drains:   [REMOVED] NG/OG/NJ/NE Tube Nasogastric 16 fr Left nostril (Removed)   Surrounding Skin Dry; Intact 06/06/21 1623   Securement device Yes 06/06/21 1623   Status Suction-low continuous 06/07/21 1103   Drainage Appearance Brown 06/07/21 1132   Free Water Flush (mL) 60 mL 06/07/21 1103   Output (mL) 200 ml 06/07/21 1132       [REMOVED] Urethral Catheter 16 fr (Removed)       Findings: mild gastritis, presbyesophagus s/p savory dilation 57F, otherwise normal EGD    Recommendation  1. Continue supportive care  2. Consult speech therapy and nutritionist  3. Continue PPI  4. Await pathology  5. Resume anticoagulation  6.  Add supplements to meals    Electronically signed by Jenelle Roberts MD on 7/11/2021 at 8:23 AM

## 2021-07-11 NOTE — PROGRESS NOTES
Report called to [facility] RN post bronchoscopy. Patient tolerated procedure well, VSS, O2 99%. Pt received 75mcg and 3mg versed for procedure 0810. Patient is sleepy, resting in bed quietly with eyes closed at this time but awakens easily to verbal stimuli. Will continue to monitor and return to room when recovery phase complete.

## 2021-07-11 NOTE — PROGRESS NOTES
intact, grossly non-focal.  Psychiatric: Alert and oriented, thought content appropriate, normal insight  Capillary Refill: Brisk,< 3 seconds   Peripheral Pulses: +2 palpable, equal bilaterally       Labs:   Recent Labs     07/09/21  0723 07/10/21  0611   WBC 6.2 5.5   HGB 12.8 12.2   HCT 37.5 35.7*    241     Recent Labs     07/09/21  0723 07/10/21  0611    135*   K 4.0 3.8    100   CO2 27 26   BUN 19 13   CREATININE 0.7 0.7   CALCIUM 10.1 9.6   PHOS 2.2* 2.9     No results for input(s): AST, ALT, BILIDIR, BILITOT, ALKPHOS in the last 72 hours. No results for input(s): INR in the last 72 hours. No results for input(s): David Favre in the last 72 hours. Urinalysis:      Lab Results   Component Value Date    NITRU Negative 07/07/2021    WBCUA 0-2 06/06/2021    BACTERIA 4+ 07/07/2020    RBCUA 0-2 06/06/2021    BLOODU Negative 07/07/2021    SPECGRAV 1.010 07/07/2021    GLUCOSEU Negative 07/07/2021       Consults:    IP CONSULT TO HOSPITALIST  IP CONSULT TO SOCIAL WORK  IP CONSULT TO GI      Assessment/Plan:    Active Hospital Problems    Diagnosis     N&V (nausea and vomiting) [R11.2]     A-fib ([address] [I48.91]     HTN (hypertension) [I10]     CAD (coronary artery disease) [I25.10]     Hyperlipidemia [E78.5]        N/V - of unclear etiology. Given that is was present long prior to recent hernia surgical repair doubt significant post-procedural complication. CT scan reassuring w/out evidence of obstruction but w/ possible seroma/hematom favoured - doubt abscess clinically. Admission U/A negative. Possibly  2/2 constipation  GI on board  EGD today showed mild gastritis, presbyesophagus s/p savory dilation 57F, otherwise normal EGD   Continue PPI     Barium swallow demonstrating luminal narrowing and esophageal dysmotility with disordered primary peristalsis and visualization of tertiary contractions as described.   Hx esophageal dilation in past w/ Dr. Han Isidro   S/p savory dilation 62 F today by GI     HTN/CAD - w/ known CAD but no evidence of active signs/sxs of ischemia/failure. Currently controlled on home meds w/ vitals reviewed and documented as above.     HyperLipidemia - controlled on home Statin.     Afib - chronic paroxysmal of unspecified and clinically unable to determine etiology. Normally rate controlled on BBlocker - continued. Anticoagulated at baseline on home Eliquis - continued. Monitored on tele.      CHF - chronic systolic failure w/ reduced EF 20-25% by Echo dated June 2021. Likely due to ischemic heart disease. No evidence of acute decompensation. Continue current medical mgt. Tolerated initial IVF.      Anemia - etiology clinically unable to determine, w/out evidence of active bleeding/hemolysis. Stable and asymptomatic w/out indication for transfusion. Follow serial labs. Reviewed and documented as above.     HypoPhosphatemia - etiology clinically unable to determine. Follow serial labs and replace PRN        DVT Prophylaxis: Eliquis    Recent Labs     07/09/21  0723 07/10/21  0611    241     Diet: ADULT DIET; Regular  Code Status: Full Code    PT/OT Eval Status: not yet ordered.       Bruce Jorge MD

## 2021-07-12 NOTE — PLAN OF CARE
Problem: Nutrition Deficit:  Goal: Ability to achieve adequate nutritional intake will improve  Description: Ability to achieve adequate nutritional intake will improve  Outcome: Ongoing  Note:  I/O last 3 completed shifts: In: 275 [P.O.:237; I.V.:200]  Out: -   No intake/output data recorded.

## 2021-07-12 NOTE — PROGRESS NOTES
PROGRESS NOTE  S:81 yrs Patient  admitted on 7/7/2021 with N&V (nausea and vomiting) [R11.2] . Patient frustated as states can't eat due to nausea, not swallowing issues. Current Hospital Schedued Meds   SMOG Enema  330 mL Rectal Once    sodium chloride flush  5-40 mL Intravenous 2 times per day    apixaban  2.5 mg Oral BID    clopidogrel  75 mg Oral Daily    metoprolol succinate  100 mg Oral Daily    metoprolol succinate  25 mg Oral Nightly    spironolactone  25 mg Oral Daily    atorvastatin  20 mg Oral Nightly     Current Hospital IV Meds   sodium chloride       Current Hospital PRN Meds  bisacodyl, LORazepam, ondansetron, sodium chloride flush, sodium chloride, ondansetron **OR** ondansetron, polyethylene glycol, acetaminophen **OR** acetaminophen, promethazine    Exam:   Vitals:    07/12/21 0809   BP: 127/82   Pulse: 83   Resp: 18   Temp: 97.7 °F (36.5 °C)   SpO2: 94%     I/O last 3 completed shifts: In: 26 [P.O.:237]  Out: -    General appearance: alert, appears stated age and cooperative  HEENT: PERRLA  Neck: no adenopathy, no carotid bruit, no JVD, supple, symmetrical, trachea midline and thyroid not enlarged, symmetric, no tenderness/mass/nodules  Lungs: clear to auscultation bilaterally  Heart: regular rate and rhythm, S1, S2 normal, no murmur, click, rub or gallop  Abdomen: soft, non-tender; bowel sounds normal; no masses,  no organomegaly  Extremities: extremities normal, atraumatic, no cyanosis or edema     Labs:  CBC:   Recent Labs     07/10/21  0611   WBC 5.5   HGB 12.2   HCT 35.7*   MCV 91.5        BMP:   Recent Labs     07/10/21  0611 07/12/21  1123   * 137   K 3.8 4.1    99   CO2 26 28   PHOS 2.9  --    BUN 13 23*   CREATININE 0.7 0.7     LIVER PROFILE: No results for input(s): AST, ALT, LIPASE, PROT, BILIDIR, BILITOT, ALKPHOS in the last 72 hours. Invalid input(s):   AMYLASE,  ALB  PT/INR: No results for input(s): INR in the last 72 hours. Invalid input(s): PT    IMAGING:  No results found. Hospital Problems         Last Modified POA    * (Principal) N&V (nausea and vomiting) 7/8/2021 Yes    HTN (hypertension) 7/8/2021 Yes    Hyperlipidemia 7/8/2021 Yes    CAD (coronary artery disease) 7/8/2021 Yes    A-fib ([address] 7/8/2021 Yes         Impression:  77 yo female with nausea and vomiting, abnormal esophagram s/p EGD with dilation, s/p cholecystectomy    Recommendation:  1. Trial calcium channel blocker given complaints  2. Add bile acid binder  3.  Check RUQ [address], DO  6:14 PM 7/12/2021            [facility]    [address]      [address] [place] [address]     Phone: [phone]     Fax: [phone]

## 2021-07-12 NOTE — PROGRESS NOTES
Comprehensive Nutrition Assessment    Type and Reason for Visit:  Reassess    Nutrition Recommendations/Plan:   1. Continue Regular diet   2. Restart vanilla Magic cups and Ensure high protein as tolerated, (Magic cups can be purchased at [facility] in [facility], must call ahead 24 hours notice to have shipped in)  3. Will monitor nutritional adequacy, nutrition-related labs, weights, BMs, and clinical progress      Nutrition Assessment:  Follow up:  Dietitian consult ordered due to poor po intake x 5 days. Meal intake minimal, consumed % nutrition supplements when ordered. RD re-ordered nutrition supplements that were cancelled on 7/11/21 with NPO order. Hospice consulted, meeting with patient and family at this time. Will monitor nutrition progression. Malnutrition Assessment:  Malnutrition Status:  Severe malnutrition    Context:  Acute Illness     Findings of the 6 clinical characteristics of malnutrition:  Energy Intake:  Mild decrease in energy intake (Comment)  Weight Loss:  No significant weight loss     Body Fat Loss:  7 - Moderate body fat loss     Muscle Mass Loss:  7 - Moderate muscle mass loss    Fluid Accumulation:  No significant fluid accumulation     Strength:  Not Performed    Estimated Daily Nutrient Needs:  Energy (kcal):  9666-3621 kcals/day; Weight Used for Energy Requirements:  Ideal (50 kg)     Protein (g):  50-60 g/day; Weight Used for Protein Requirements:  Ideal (1-1.2 g/kg)        Fluid (ml/day):  7167-5284 ml/day or per MD; Method Used for Fluid Requirements:  1 ml/kcal      Nutrition Related Findings:  Phos low, 2.4. + BM yesterday, per pt. Wounds:  Surgical Incision       Current Nutrition Therapies:    ADULT DIET; Regular  Adult Oral Nutrition Supplement; Frozen Oral Supplement  Adult Oral Nutrition Supplement;  Low Calorie/High Protein Oral Supplement    Anthropometric Measures:  · Height: 5' 2\" (157.5 cm)  · Current Body Weight: 97 lb (44 kg)   · Ideal Body Weight: 110 lbs; % Ideal Body Weight 80.9 %   · BMI: 17.7  · Adjusted Body Weight:  ; No Adjustment   · BMI Categories: Underweight (BMI less than 22) age over 72       Nutrition Diagnosis:   · Severe malnutrition related to pain, early satiety as evidenced by intake 26-50%, BMI, weight loss, poor intake prior to admission, moderate loss of subcutaneous fat, moderate muscle loss    Nutrition Interventions:   Food and/or Nutrient Delivery:  Continue Current Diet, Continue Oral Nutrition Supplement  Nutrition Education/Counseling:  No recommendation at this time   Coordination of Nutrition Care:  Continue to monitor while inpatient    Goals:  Consume 50% or greater of 3 meals per day and ONS       Nutrition Monitoring and Evaluation:   Behavioral-Environmental Outcomes:  None Identified   Food/Nutrient Intake Outcomes:  Supplement Intake, Food and Nutrient Intake  Physical Signs/Symptoms Outcomes:  Biochemical Data, Nutrition Focused Physical Findings     Discharge Planning:     Too soon to determine     Electronically signed by Luan Burton, [address],  on 7/12/21 at 2:31 PM EDT    Contact: Office: [phone]; [facility]: [phone]

## 2021-07-12 NOTE — PLAN OF CARE
Nutrition Problem #1: Severe malnutrition  Intervention: Food and/or Nutrient Delivery: Continue Current Diet, Continue Oral Nutrition Supplement  Nutritional Goals: Consume 50% or greater of 3 meals per day and ONS

## 2021-07-12 NOTE — PROGRESS NOTES
Met with patient and her son Gayle Hoff to discuss hospice philosophy and services. Patient is interested in hospice. However son is concerned more with patient's mental health and wondering if this should be addressed before enrolling patient into hospice services. .  Patient is tearful throughout conversation, expressing grief over loss of  4 years ago, and expressing wish to give up and die. Gayle Hoff verbalized that his mother's mental state may be clouding her decision as far as goals of care. Updated BALJINDER Sierra and Sophie Dennis CNP. HOC will follow up tomorrow. Requested BALJINDER Sierra provide list of private duty agencies to patient. Jason Lozano RN, [address], [address] [place] [address]   O: [phone]  C: [phone]  F: [phone]   Main & Referrals: [phone]   [email or web address]

## 2021-07-12 NOTE — PLAN OF CARE
Problem: Nutrition Deficit:  Goal: Ability to achieve adequate nutritional intake will improve  Description: Ability to achieve adequate nutritional intake will improve  7/12/2021 1433 by Dalila Pelayo RN  Outcome: Ongoing     Problem: Nutrition  Goal: Optimal nutrition therapy  7/12/2021 1433 by Dalila Pelayo RN  Outcome: Ongoing

## 2021-07-12 NOTE — PROGRESS NOTES
Speech Language Pathology    SLP acknowledged order for evaluation, reviewed pt's chart, spoke with RN. Pt currently meeting with hospice at bedside. ST to continue to follow and re-attempt eval as pt is able and appropriate.     Stacy Hood M.S. [facility]  Speech-language pathologist  [phone]

## 2021-07-12 NOTE — PROGRESS NOTES
Physical Therapy  Facility/Department: [facility] C5 - MED SURG/ORTHO  Daily Treatment Note  NAME: Red Hernandez  : 1940  MRN: [id number]    Date of Service: 2021    Discharge Recommendations:  Home with assist PRN, Home with Home health PT   PT Equipment Recommendations  Equipment Needed: No  Other: patient said that she has a rolling walker at home. Assessment   Body structures, Functions, Activity limitations: Decreased functional mobility ; Decreased endurance;Decreased strength  Assessment: Patient is an 78year old female who was admitted to [facility] on 21 with nausea & vomiting. Patient is normally independent with mobility. Today the patient completed bed mob mod indep,  transfers with Superv and ambulated with Superv & a RW. Patient also ascended 3 steps with rails and Superv. Patient presented with the therapy deficits listed above. PT recommends that this patient receive home PT to address these deficits. Patient is safe for home, when medically stable, with PRN supervision/assistance and continued use of her rolling walker. PT to continue to follow. Treatment Diagnosis: decreased independence with functional mobility. Prognosis: Good  Decision Making: Medium Complexity  Patient Education: Disease Specific Education: Patient educated on importance of OOB mobility, prevention of complications of bedrest, and general safety during hospitalization. Patient verbalized understanding  Barriers to Learning: none  REQUIRES PT FOLLOW UP: Yes  Activity Tolerance  Activity Tolerance: Patient Tolerated treatment well  Activity Tolerance: /79  HR 90  O2 92% RA     Patient Diagnosis(es): The primary encounter diagnosis was Failure to thrive in adult. A diagnosis of Nausea was also pertinent to this visit.      has a past medical history of CAD (coronary artery disease), Cancer ([address] Carotid artery disease ([facility] [address] HTN (hypertension), Hyperlipemia, Hyperlipidemia, LBP (low back pain), Lung nodules, Osteoporosis, Seasonal allergies, Stress bladder incontinence, female, Unspecified cerebral artery occlusion with cerebral infarction, and Wears glasses. has a past surgical history that includes Varicose vein surgery; Cardiac catheterization; Colonoscopy (8/13/2014); and hernia repair (Right, 6/9/2021). Restrictions  Restrictions/Precautions  Restrictions/Precautions: General Precautions, Fall Risk, Up as Tolerated  Position Activity Restriction  Other position/activity restrictions: NPO. Subjective   General  Chart Reviewed: Yes  Additional Pertinent Hx: HPI per chart, \"78 y.o. female s/p recent SBO w/ incarceration having underwent R femoral hernia repair Early June 2021 who presented to [facility] with progressive N/V since surgery, although it had been present for the past year. Sxs worse over the last week unable to tolerate adequate PO. CT scan reassuring w/out evidence of obstruction but w/ possible seroma/hematom favoured - doubt abscess clinically. \"  Response To Previous Treatment: Patient with no complaints from previous session. Family / Caregiver Present: Yes (son)  Referring Practitioner: Orrie Fleischer, MD  Subjective  Subjective: Patient agreed to participate. General Comment  Comments: Cleared for therapy by RN. Pain Screening  Patient Currently in Pain: Denies (at rest)  Vital Signs  Patient Currently in Pain: Denies (at rest)       Orientation  Orientation  Overall Orientation Status: Within Normal Limits  Cognition      Objective   Bed mobility  Supine to Sit: Modified independent  Transfers  Sit to Stand: Supervision  Stand to sit: Supervision  Comment: with RW.  safety cues to keep rw close before turning to sit down  Ambulation  Ambulation?: Yes  Ambulation 1  Surface: level tile  Device: Rolling Walker  Assistance: Supervision  Quality of Gait: significant kyphotic posture/rounded shoulders/forward head posture, cueing to correct. decreased bilateral step height/step length.  cueing to increase bilateral knee/hip flexion to improve/compensate. cueing for RW placement (patient tends to abandon her RW just prior to sitting down. Gait Deviations: Slow Cheri;Decreased step length;Decreased step height  Distance: 120' x 2  Comments: No complaints of shortness of breath, chest pain or dizziness. No loss of balance. Stairs/Curb  Stairs?: Yes  Stairs  # Steps : 3  Stairs Height: 6\"  Rails: Bilateral  Device: No Device  Assistance: Stand by assistance  Comment: No complaints of shortness of breath, chest pain or dizziness. Safe non-reciprocal step pattern. No loss of balance. Balance  Posture: Poor  Sitting - Static: Good  Sitting - Dynamic: Good  Standing - Static: Good  Standing - Dynamic: Fair;+ (rw)  Exercises  Hip Flexion: 15 B  Hip Abduction: 15 B  Knee Long Arc Quad: 15 B  Ankle Pumps: 15 B             AM-PAC Score  AM-PAC Inpatient Mobility Raw Score : 21 (07/12/21 1112)  AM-PAC Inpatient T-Scale Score : 50.25 (07/12/21 1112)  Mobility Inpatient CMS 0-100% Score: 28.97 (07/12/21 1112)  Mobility Inpatient CMS G-Code Modifier : CJ (07/12/21 1112)          Goals  Short term goals  Time Frame for Short term goals: 1 week 7/17/21  Short term goal 1: Supine <> sit with mod I  -7/12 met  Short term goal 2: Sit <> stand and bed <> chair with LRAD and mod I    -7/12 superv  Short term goal 3: Ambulate 150 feet with LRAD and mod I   -7/12 spv 120' rw  Short term goal 4: Ascend 3 steps with rail and mod I.   -7/12 spv  Short term goal 5: Patient will tolerate 12-15 reps of her exercises to maximize her strength/endurance.   -7/12 initiated  Patient Goals   Patient goals : To become stronger. \"I need to be able to eat again so I can be strong again. \"    Plan    Plan  Times per week: 3-5/week  Plan weeks: 1 week 7/17/21  Specific instructions for Next Treatment: progress mobility as tolerated  Current Treatment Recommendations: Strengthening, Transfer Training, Endurance Training, Patient/Caregiver Education & Training, Equipment Evaluation, Education, & procurement, Balance Training, Gait Training, Home Exercise Program, Functional Mobility Training, Safety Education & Training, Pain Management, Stair training  Safety Devices  Type of devices:  All fall risk precautions in place, Call light within reach, Patient at risk for falls, Nurse notified, Gait belt, Left in chair     Therapy Time   Individual Concurrent Group Co-treatment   Time In 1002         Time Out 1040         Minutes 38         Timed Code Treatment Minutes: [address] Hemostasis: Electrocautery

## 2021-07-13 NOTE — CONSULTS
[address] [address]                                  CONSULTATION    PATIENT NAME: James Alba                  :        1940  MED REC NO:   [id number]                          ROOM:       0542  ACCOUNT NO:   [de-identified]                           ADMIT DATE: 2021  PROVIDER:     Gerhardt Budge, MD    CONSULT DATE:  2021    HISTORY OF PRESENT ILLNESS:  The patient is an 78-year-old   female who was referred for admission for weight loss and persistent  nausea. She has been worked up for this over this past year and she has  a number of other health problems including coronary artery disease,  hypertension, hyperlipidemia and has lost 30 pounds over the last couple  of years. Hospice has seen the patient and initially the patient had  stated that she was comfortable with the idea of transitioning care to a  hospice model, but the treatment team here as well as the patient's son  indicated that she is often tearful, seems depressed and wanted  psychiatry to address this before any decision is made regarding  hospice. The patient reports that she is feeling stronger today because she was  able to get some Ensure down. She had an EGD yesterday and it was  dilatation of a narrow part of her esophagus and she has had a number of  other procedures to address possible causes for her nausea, including  partial bowel obstruction of the celiac artery stent and she had a  dilatation of her esophagus earlier this year as well. The patient  states that she does struggle with the death of her  4 years ago  from complications around COPD. He is retired at the age of 54 and had  developed both mental health as well as physical health challenges and  the patient took care of him.   She seemed to enjoy her life together,  they would have been  61 years this year and she quickly becomes  tearful when talking about missing him. She states she has always been  responsible and always taking care of people and now that she only has  herself. She is left with her thoughts and worries about how she is  going to feel on any given day. Her outside activities have to some  degree collapsed with her health problems. She does remark that she has  made \"silly statements\" in the past, stating \"I just cannot go on and  wanted all to end,\" but states that she would never dream to kill  herself, that she \"would not have the guts. \"  She bemoans the fact that  she is on a lot of medications now, when she was not and not too long  ago and does not necessarily feel better for all of it. She states she  spends a significant amount of her day worrying and feeling overwhelmed  which is what also leads to her feeling tearful and feeling like she  does not want to go on day-to-day. PAST PSYCHIATRIC HISTORY:  She has never been admitted psychiatrically. She denies any history of suicide attempts. She states that the doctor  from [facility] a couple of years ago wanted to start her on  something for her nerves, but she never took it and now wishes that she  had. FAMILY PSYCHIATRIC HISTORY:  Negative for completed suicides. SOCIAL HISTORY:  The patient states that she is independent and does not  report that finances are a problem. Her son is very helpful to her and  get her to her appointments and she is pretty much stopped driving  because she is afraid that she will end up causing an accident. She  denies that anyone is taking advantage of her. She does not report any  history of trauma. She was born as a set of twins and the youngest in  the family of four children and her oldest brother is still alive as an  . She has worked as a  and worked for 13 years for the  [employer] and states she met a lot of interesting people there.     REVIEW OF SYSTEMS:  The patient certainly reports nausea which is a  persistent problem for her, sometimes she feels out of breath. She  denies chest pain. There is no radiating pain. She also reports  feeling overwhelmed and anxious and the rest of her 10-point system  review is negative. PHYSICAL EXAMINATION:  VITAL SIGNS:  Her temperature was 97.8, her respirations were 16, her  blood pressure was 128/82, her heart rate was 78. GENERAL APPEARANCE:  The patient appears as a female who looks her  stated age and certainly looks emaciated. NEUROMUSCULAR EXAM:  The patient appeared to have generalized weakness,  but normal muscle tone throughout. Her gait was not tested because she  was feeling too weak to do it. MENTAL STATUS EXAM:  The patient is a female who is very cooperative and  maintains good eye contact throughout. She does not present with any  abnormal movements, tics or mannerisms consistent with tardive  dyskinesia. She denies auditory or visual hallucinations. She is not  delusional or paranoid. Her mood is anxious I believe, more so it is  depressed and her affect is consistent with that. She denies any  suicidal or homicidal ideation, intent or plan. She is alert and  oriented x4. Her speech is spontaneous and goal directed. Her language  is without any kind of aphasia. Her memory is 3/3 objects immediately  and 3/3 objects after about 10 minutes. Her answers to judgment  questions were appropriate. Her attention span is good. Her general  fund of knowledge is good. Insight and judgment are good as well. DIAGNOSES:  Axis I:  Anxiety disorder - unspecified. Axis II:  No diagnosis. Axis III:  1.  Nausea and vomiting. 2.  Coronary artery disease. 3.  Hypertension. 4.  Hyperlipidemia. Axis IV:  Moderate. Axis V:  Current GAF 55, highest in the past year 72 to 79. RECOMMENDATIONS:  1.  I do not believe the patient is suicidal and will not need to be  admitted to Psychiatry.   However, I do believe that she has significant  anxiety and would benefit from treatment. Typically, I would not  prescribe benzodiazepine, but I think given her age and overall  situation, I will start Xanax 0.25 mg twice a day. We did review the  side effects and risks with the medication, but the patient was very  focused on needing to improve the quality of her life, stating \"it is  when I feel hopeless and anxious and I cannot stop worrying that I think  hospice sounds like a good idea. \"  We did discuss the risk of falls and  sedation and lethargy and so will start the lowest dose they have and we  will have to monitor that. She would also benefit from grief counseling  because her grief is very under the surface and very available to her. She feels guilty for somehow having caused her late 's physical  and emotional problems and really this stems from an earlier sense of  guilt around her father's leaving the family. All of this would benefit  from some counseling and so we can refer to Dr. Davon Garcia office (phone  number [phone]). She states she is willing to follow up. Please call  if there are any questions. 2.  More than 70 minutes were spent on the consultation more than half  of which was spent in direct patient care and also included treatment  planning and care coordination.         Orquidea De La Vega MD    D: 07/13/2021 13:19:48       T: 07/13/2021 13:26:20     [email or web address]  Job#: [id number]     Doc#: [id number]    CC:

## 2021-07-13 NOTE — DISCHARGE INSTR - COC
Continuity of Care Form    Patient Name: Flor Strange   :  1940  MRN:  [id number]    Admit date:  2021  Discharge date:  2021    Code Status Order: Limited   Advance Directives:      Admitting Physician:  Casimer Fabry, MD  PCP: Ana Mckinley, APRN - CNP    Discharging Nurse: Chance saavedra  [address] Unit/Room#: 8218/5729-89  Discharging Unit Phone Number: [phone]    Emergency Contact:   Extended Emergency Contact Information  Primary Emergency Contact: Aubery Cheadle [facility] [address] Phone: [phone]  Mobile Phone: [phone]  Relation: Child  Secondary Emergency Contact: Tanvi Kilgore  Address: [address] [place] [address] [address]  Phone: [phone]  Work Phone: [phone]  Relation: Child    Past Surgical History:  Past Surgical History:   Procedure Laterality Date    CARDIAC CATHETERIZATION      COLONOSCOPY  2014    HERNIA REPAIR Right 2021    ROBOTIC RIGHT INGUINAL HERNIA REPAIR performed by Zenaida Garcia MD at [address] [place] [address]         Immunization History:   Immunization History   Administered Date(s) Administered    Influenza 10/29/2012    Influenza Virus Vaccine 10/29/2003, 10/05/2005, 2007, 2008, 10/27/2010, 10/27/2010, 10/18/2011, 2011, 2020    Influenza, High Dose (Fluzone 65 yrs and older) 10/29/2014, 2015, 2017    Pneumococcal Conjugate 13-valent (Oakley Bill) 05/10/2016    Pneumococcal Polysaccharide (Sjdqymlld10) 2002    Zoster Live (Zostavax) 2010       Active Problems:  Patient Active Problem List   Diagnosis Code    HTN (hypertension) I10    Hyperlipidemia E78.5    Low back pain M54.5    Stress bladder incontinence, female N39.3    Seasonal allergies J30.2    CAD (coronary artery disease) I25.10    Carotid artery disease I77.9    Wears glasses Z97.3    Lung nodules R91.8    Aneurysm of thoracic aorta ([address] I71.2    Menopausal osteoporosis M81.0    Essential hypertension I10    Coronary artery calcification I25.10, I25.84    Bronchiectasis without complication (HCC) O35.1    A-fib ([facility]) I48.91    Paroxysmal atrial fibrillation with rapid ventricular response ([facility]) I48.0    Incarcerated right inguinal hernia K40.30    SBO (small bowel obstruction) ([address] K56.609    PAD (peripheral artery disease) ([facility]) I73.9    Celiac artery stenosis ([facility]) I77.4    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    N&V (nausea and vomiting) R11.2       Isolation/Infection:   Isolation            No Isolation          Patient Infection Status       Infection Onset Added Last Indicated Last Indicated By Review Planned Expiration Resolved Resolved By    None active    Resolved    COVID-19 Rule Out 06/13/21 06/13/21 06/13/21 COVID-19, Rapid (Ordered)   06/13/21 Rule-Out Test Resulted    COVID-19 Rule Out 06/09/21 06/09/21 06/09/21 COVID-19, Rapid (Ordered)   06/09/21 Rule-Out Test Resulted            Nurse Assessment:  Last Vital Signs: BP (!) 167/91   Pulse 84   Temp 97.6 °F (36.4 °C) (Oral)   Resp 18   Ht 5' 2\" (1.575 m)   Wt 87 lb 2 oz (39.5 kg)   SpO2 95%   BMI 15.94 kg/m²     Last documented pain score (0-10 scale): Pain Level: 0  Last Weight:   Wt Readings from Last 1 Encounters:   07/12/21 87 lb 2 oz (39.5 kg)     Mental Status:  oriented    IV Access:  - None    Nursing Mobility/ADLs:  Walking   Assisted  Transfer  Assisted  Bathing  Assisted  Dressing  Independent  Toileting  Assisted  Feeding  Independent  Med Admin  Independent  Med Delivery   whole    Wound Care Documentation and Therapy:        Elimination:  Continence:   · Bowel: Yes  · Bladder: Yes  Urinary Catheter: None   Colostomy/Ileostomy/Ileal Conduit: No       Date of Last BM: unknown  No intake or output data in the 24 hours ending 07/13/21 1340  No intake/output data recorded. Safety Concerns:      At Risk for Falls    Impairments/Disabilities: None    Nutrition Therapy:  Current Nutrition Therapy:   - Oral Nutrition Supplement:  Standard  twice a day    Routes of Feeding: Oral  Liquids: No Restrictions  Daily Fluid Restriction: no  Last Modified Barium Swallow with Video (Video Swallowing Test): not done    Treatments at the Time of Hospital Discharge:   Respiratory Treatments: none  Oxygen Therapy:  is not on home oxygen therapy. Ventilator:    - No ventilator support    Rehab Therapies: Physical Therapy, Occupational Therapy and Nurse: Juan David Kraft referral  Weight Bearing Status/Restrictions: No weight bearing restirctions  Other Medical Equipment (for information only, NOT a DME order):  walker  Other Treatments: [facility]: LEVEL [facility] to establish plan of care for patient over 60 day period  Nursing:  Initial home SN evaluation visit to occur within 24-48 hours of hospital discharge  [facility] nursing visits daily, then QOD with progression as warranted for:  1)  medication management  2)  VS and clinical assessment  3)  S&S chronic disease exacerbation education + when to contact MD/NP  4)  care coordination  Medication Reconciliation during 1st SN visit  Nurse telephone visit on the days that visit is not being made in person for first 30 days                               PT/OT/Speech   Evaluations in home within 24-48 hours of hospital discharge to include DME and home safety   Frontload therapy 5 days, then 3x a week   OT evaluation for [address] needs for personal care    Palliative Care referral within 5 days of hospital discharge    evaluation within 24-48 hours of hospital discharge to evaluate resources & insurance for potential  [place], [place], LTC, and Medicaid options   Dietician evaluation within 5 days of hospital discharge   PCP visit within 3 days of hospital discharge, followed by PCP visit @ 10 days, and 21 days   TeleHealth (Home care Vitals) if patient agreeable and qualifies. Patient's personal belongings (please select all that are sent with patient):  None    RN SIGNATURE:  Electronically signed by Dana Garnett RN on 7/13/21 at 2:05 PM EDT    CASE MANAGEMENT/SOCIAL WORK SECTION    Inpatient Status Date: ***    Readmission Risk Assessment Score:  Readmission Risk              Risk of Unplanned Readmission:  17           Discharging to Facility/ [address] [place] [address]  [phone]  Fax [phone]      / signature: Electronically signed by Beena Rojo RN on 7/13/21 at 1:46 PM EDT    PHYSICIAN SECTION    Prognosis: Good    Condition at Discharge: Stable      Recommended Labs or Other Treatments After Discharge: Home PT/OT    Physician Certification: I certify the above information and transfer of Norma Coker  is necessary for the continuing treatment of the diagnosis listed and that she requires [address] for greater 30 days.      Update Admission H&P: No change in H&P    PHYSICIAN SIGNATURE:  Electronically signed by NED Do CNP on 7/13/21 at 1:40 PM EDT

## 2021-07-13 NOTE — PROGRESS NOTES
Speech Language Pathology    SLP attempted evaluation, reviewed pt's chart, spoke with RN. RN reported pt currently at ultrasound. ST to continue to follow and re-attempt at a later time.     Fede Jenkins M.S. [address] [facility]  Speech-language pathologist  [phone]

## 2021-07-13 NOTE — OP NOTE
history and physical was  performed. The patient was classed as ASA class III. Medications were  given to achieve adequate sedation. The cardiopulmonary status was  continuously monitored throughout the procedure. The patient was placed  in the left lateral decubitus position. Once adequately sedated, a  standard upper gastroscope was inserted into the mouth and advanced  under direct visualization to the second portion of the duodenum. The  entire mucosa of the esophagus, stomach (retroflexed and forward views),  duodenum (bulb, sweep, and second portion) were examined carefully  during withdrawal.  The patient tolerated the procedure well without any  difficulties. FINDINGS:  ESOPHAGUS:  There was evidence of marked tortuosity and several  contractions consistent with esophageal dysmotility. There was no  evidence of esophageal strictures, stenosis, ulcers or lesions. Empiric  dilation was performed using 57-Tajik Savary dilator successfully. STOMACH:  Examined portion of the stomach showed mild gastritis in the  antrum characterized by erythema and granularity. Biopsies were taken. Retroflexed view of the stomach was normal.    DUODENUM:  The examined portion of the duodenum appeared normal.    SUMMARY:  1.  Mild gastritis. 2.  Esophageal dysmotility. 3.  Empiric dilation with 57-Tajik Savary dilator. RECOMMENDATIONS:  1. Return the patient to floor for continuous medical care. 2.  Continue PPI daily. 3.  Await pathology results. 4.  Resume anticoagulation. 5.  Add supplements to meals. 6.  Consult Speech Therapy and Nutritionist for further workup of  dysphagia and malnutrition. EBL: <5mL    Julito Reno MD    D: 07/12/2021 15:05:19       T: 07/12/2021 16:15:32     [email or web address]  Job#: [id number]     Doc#: [id number]    CC:   ORA Davis MD Erich Quinones, MD

## 2021-07-13 NOTE — PROGRESS NOTES
Hospitalist Progress Note      PCP: Saida Brian, APRN - CNP    Date of Admission: 7/7/2021    Chief Complaint: N/V    Subjective:  Feels improved today. Now states she \"isnt ready for Hospice, but that may change in a day or two\". Still feels slightly nauseous. Medications:  Reviewed    Infusion Medications    sodium chloride       Scheduled Medications    dilTIAZem  30 mg Oral 4 times per day    cholestyramine  1 packet Oral BID    SMOG Enema  330 mL Rectal Once    sodium chloride flush  5-40 mL Intravenous 2 times per day    apixaban  2.5 mg Oral BID    clopidogrel  75 mg Oral Daily    metoprolol succinate  100 mg Oral Daily    metoprolol succinate  25 mg Oral Nightly    spironolactone  25 mg Oral Daily    atorvastatin  20 mg Oral Nightly     PRN Meds: bisacodyl, LORazepam, ondansetron, sodium chloride flush, sodium chloride, ondansetron **OR** ondansetron, polyethylene glycol, acetaminophen **OR** acetaminophen, promethazine    No intake or output data in the 24 hours ending 07/13/21 0944    Physical Exam Performed:    BP (!) 167/91   Pulse 84   Temp 97.6 °F (36.4 °C) (Oral)   Resp 18   Ht 5' 2\" (1.575 m)   Wt 87 lb 2 oz (39.5 kg)   SpO2 95%   BMI 15.94 kg/m²     General appearance: Cachetic, elderly female, pleasant and cooperative. HEENT: Pupils equal, round, and reactive to light. Conjunctivae/corneas clear. Neck: Supple, with full range of motion. No jugular venous distention. Trachea midline. Respiratory:  Normal respiratory effort. Clear to auscultation, bilaterally without Rales/Wheezes/Rhonchi. Cardiovascular: Regular rate and rhythm with normal S1/S2 without murmurs, rubs or gallops. Abdomen: Soft, non-tender, non-distended with normal bowel sounds. Musculoskeletal: No clubbing, cyanosis or edema bilaterally. Full range of motion without deformity. Skin: Skin color, texture, turgor normal.  No rashes or lesions.   Neurologic:  Neurovascularly intact without any focal sensory/motor deficits. Cranial nerves: II-XII intact, grossly non-focal.  Psychiatric: Alert and oriented, thought content appropriate, normal insight. Capillary Refill: Brisk,< 3 seconds   Peripheral Pulses: +2 palpable, equal bilaterally       Labs:   No results for input(s): WBC, HGB, HCT, PLT in the last 72 hours. Recent Labs     07/12/21  1123      K 4.1   CL 99   CO2 28   BUN 23*   CREATININE 0.7   CALCIUM 10.3     No results for input(s): AST, ALT, BILIDIR, BILITOT, ALKPHOS in the last 72 hours. No results for input(s): INR in the last 72 hours. No results for input(s): David Favre in the last 72 hours. Urinalysis:      Lab Results   Component Value Date    NITRU Negative 07/07/2021    WBCUA 0-2 06/06/2021    BACTERIA 4+ 07/07/2020    RBCUA 0-2 06/06/2021    BLOODU Negative 07/07/2021    SPECGRAV 1.010 07/07/2021    GLUCOSEU Negative 07/07/2021       Consults:    IP CONSULT TO HOSPITALIST  IP CONSULT TO SOCIAL WORK  IP CONSULT TO GI  IP CONSULT TO DIETITIAN  IP CONSULT TO HOSPICE  IP CONSULT TO PSYCHIATRY      Assessment/Plan:    Active Hospital Problems    Diagnosis     N&V (nausea and vomiting) [R11.2]     A-fib ([address] [I48.91]     HTN (hypertension) [I10]     CAD (coronary artery disease) [I25.10]     Hyperlipidemia [E78.5]        N/V - of unclear etiology. Given that is was present long prior to recent hernia surgical repair doubt significant post-procedural complication. CT scan reassuring w/out evidence of obstruction but w/ possible seroma/hematom favoured - doubt abscess clinically. Admission U/A negative. GI on board  EGD today showed mild gastritis, presbyesophagus s/p savory dilation 57F, otherwise normal EGD   Continue PPI   RUQ US negative. GI added bile sequestrant. Barium swallow demonstrating luminal narrowing and esophageal dysmotility with disordered primary peristalsis and visualization of tertiary contractions as described.   Hx esophageal dilation in past w/ Dr. Wenceslao Aj   S/p savory dilation 62 F by GI     HTN/CAD - w/ known CAD but no evidence of active signs/sxs of ischemia/failure. Currently controlled on home meds w/ vitals reviewed and documented as above.     HyperLipidemia - controlled on home Statin.     Afib - chronic paroxysmal of unspecified and clinically unable to determine etiology. Normally rate controlled on BBlocker - continued. Anticoagulated at baseline on home Eliquis - continued. Monitored on tele.      CHF - chronic systolic failure w/ reduced EF 20-25% by Echo dated June 2021. Likely due to ischemic heart disease. No evidence of acute decompensation. Continue current medical mgt. Tolerated initial IVF.      Anemia - etiology clinically unable to determine, w/out evidence of active bleeding/hemolysis. Stable and asymptomatic w/out indication for transfusion. Follow serial labs. Reviewed and documented as above.     HypoPhosphatemia - etiology clinically unable to determine. Follow serial labs and replace PRN      Severe protein calorie malnutrition. Dietician consulted. Possible Depression  Patient is very tearful and has had ongoing GI issues and weight loss for over a year. Family requested psych consult. DVT Prophylaxis: Eliquis  Diet: Diet NPO Exceptions are: Sips of Water with Meds  Code Status: Limited    PT/OT Eval Status: recommend home care    Dispo - patient is considering hospice care given her malnutrition and inability to eat, but now states she is not ready for hospice care.       NED Du - CNP

## 2021-07-13 NOTE — PROGRESS NOTES
Physician Progress Note      Abimael Cristobal  CSN #:                  [id number]  :                       1940  ADMIT DATE:       2021 9:12 AM  DISCH DATE:        2021 2:32 PM  RESPONDING  PROVIDER #:        YASSINE Peres CNP          QUERY TEXT:    Pt admitted with \"N/V unclear etiology\". Dietary consult notes- \"  Severe   malnutrition\" Conflicting documentation from query notes \" no codeable   response\"  If possible, please document in progress notes and discharge   summary:      The medical record reflects the following:  Risk Factors: BMI 16.28, BMI, weight loss, poor intake prior to admission  Clinical Indicators: Dietary consult- -Severe malnutrition related to pain,   early satiety as evidenced by intake 26-50%, BMI, weight loss, poor intake   prior to admission, moderate loss of subcutaneous fat, moderate muscle loss        poor PO intake/appetite and weight loss, previously reported UBW of 121 lb   6 months ago, but son reported that it was a few years ago. Stable at 89 lb   for the past few months. Treatment: Dietary, GI consult, Food and/or Nutrient Delivery: Continue   Current Diet, Start Oral Nutrition Supplement, Continue Oral Nutrition   Supplement, Nutritional Goals: Consume 50% or greater of 3 meals per day and   ONS. I&O's, supportive care, serial labs. Options provided:  -- Severe malnutrition confirmed present on admission  -- Severe malnutrition  ruled out  -- Other - I will add my own diagnosis  -- Disagree - Not applicable / Not valid  -- Disagree - Clinically unable to determine / Unknown  -- Refer to Clinical Documentation Reviewer    PROVIDER RESPONSE TEXT:    The diagnosis of severe malnutrition was confirmed as present on admission.     Query created by: Tutu Shown on 2021 2:23 PM      Electronically signed by:  Gene Peres CNP 2021 2:52 PM

## 2021-07-13 NOTE — CARE COORDINATION
CASE MANAGEMENT DISCHARGE SUMMARY      Discharge to: Home with resumption of care thru [facility] SN, PT and OT. [facility] following as well and provided Non-skilled Home list to pt at bedside as requested by pt's son. IMM given: today    New Durable Medical Equipment ordered/agency: None    Transportation: family    Confirmed discharge plan with: Met with pt at bedside. Patient: yes     Facility/Agency, name:  [facility] faxed- yes. Called and confirmed receipt. RN, name: Kira Morales    Note: Discharging nurse to complete [facility], reconcile [facility], and place final copy with patient's discharge packet. RN to ensure that written prescriptions for  Level II medications are sent with patient to the facility as per protocol.

## 2021-07-13 NOTE — PROGRESS NOTES
Hospitalist Progress Note      PCP: Lorenza Carrasco, APRN - CNP    Date of Admission: 7/7/2021    Chief Complaint: N/V    Subjective:  OOB to chair. Feels miserable. Son at bedside. States she \"wants to die\". Very tearful. Is able to keep PO liquids down, but feels very nauseous. Medications:  Reviewed    Infusion Medications    sodium chloride       Scheduled Medications    dilTIAZem  30 mg Oral 4 times per day    cholestyramine  1 packet Oral BID    SMOG Enema  330 mL Rectal Once    sodium chloride flush  5-40 mL Intravenous 2 times per day    apixaban  2.5 mg Oral BID    clopidogrel  75 mg Oral Daily    metoprolol succinate  100 mg Oral Daily    metoprolol succinate  25 mg Oral Nightly    spironolactone  25 mg Oral Daily    atorvastatin  20 mg Oral Nightly     PRN Meds: bisacodyl, LORazepam, ondansetron, sodium chloride flush, sodium chloride, ondansetron **OR** ondansetron, polyethylene glycol, acetaminophen **OR** acetaminophen, promethazine    No intake or output data in the 24 hours ending 07/12/21 2008    Physical Exam Performed:    BP (!) 134/97   Pulse 86   Temp 97.8 °F (36.6 °C) (Oral)   Resp 18   Ht 5' 2\" (1.575 m)   Wt 87 lb 2 oz (39.5 kg)   SpO2 95%   BMI 15.94 kg/m²     General appearance: Cachetic, elderly female. Tearful. HEENT: Pupils equal, round, and reactive to light. Conjunctivae/corneas clear. Neck: Supple, with full range of motion. No jugular venous distention. Trachea midline. Respiratory:  Normal respiratory effort. Clear to auscultation, bilaterally without Rales/Wheezes/Rhonchi. Cardiovascular: Regular rate and rhythm with normal S1/S2 without murmurs, rubs or gallops. Abdomen: Soft, non-tender, non-distended with normal bowel sounds. Musculoskeletal: No clubbing, cyanosis or edema bilaterally. Full range of motion without deformity. Skin: Skin color, texture, turgor normal.  No rashes or lesions.   Neurologic:  Neurovascularly intact without any focal esophageal dilation in past w/ Dr. Gina Moy   S/p adriana dilation 62 F by GI     HTN/CAD - w/ known CAD but no evidence of active signs/sxs of ischemia/failure. Currently controlled on home meds w/ vitals reviewed and documented as above.     HyperLipidemia - controlled on home Statin.     Afib - chronic paroxysmal of unspecified and clinically unable to determine etiology. Normally rate controlled on BBlocker - continued. Anticoagulated at baseline on home Eliquis - continued. Monitored on tele.      CHF - chronic systolic failure w/ reduced EF 20-25% by Echo dated June 2021. Likely due to ischemic heart disease. No evidence of acute decompensation. Continue current medical mgt. Tolerated initial IVF.      Anemia - etiology clinically unable to determine, w/out evidence of active bleeding/hemolysis. Stable and asymptomatic w/out indication for transfusion. Follow serial labs. Reviewed and documented as above.     HypoPhosphatemia - etiology clinically unable to determine. Follow serial labs and replace PRN      Severe protein calorie malnutrition. Dietician consulted. Possible Depression  Patient is very tearful and has had ongoing GI issues and weight loss for over a year. Family requested psych consult. DVT Prophylaxis: Eliquis  Diet: ADULT DIET; Regular  Adult Oral Nutrition Supplement; Frozen Oral Supplement  Adult Oral Nutrition Supplement; Low Calorie/High Protein Oral Supplement  Diet NPO Exceptions are: Sips of Water with Meds  Code Status: Limited    PT/OT Eval Status: recommend home care    Dispo - patient is considering hospice care given her malnutrition and inability to eat. [address] consulted.        Lul Castellon, APRN - CNP

## 2021-07-13 NOTE — CONSULTS
Note dictated. Very pleasant woman who feels guilty about   developing mental and physical challenges past 20 years before his death (and remotely guilty for father leaving family when she was 1years old). Worries about things which seems to have some role in her nausea. Dx:  Anxiety disorder    Rec:  1). Although typically I would not Rx a benzodiazepine, in her situation at her age, I think its the most likely to work quickly, be effective and not too worried about dependence. I will start xanax 0.25 mgs BID. It is the quickest acting and shortest half life which is best in her age group. The dose will require some follow up either by Dr Ellie Amezcua or her PCP. She would benefit from counseling for her grief which remains very raw and below the surface. She agrees with this. Case management can set up services through Dr Sharon Rodríguez office ([phone]). She is not suicidal and will not need inpatient level of care.       Paul Esteves MD

## 2021-07-13 NOTE — CARE COORDINATION
[facility]    Patient active w [place] [facility] for skilled services; denver marrero notified she will fax referral; they do not have epic access to pull referral    Lisa Garcia RN, BSN CTN  [facility] [phone]

## 2021-07-17 PROBLEM — R62.7 FAILURE TO THRIVE IN ADULT: Status: ACTIVE | Noted: 2021-01-01

## 2021-07-17 NOTE — ED NOTES
Soap suds enema completed. Pt tolerated well. Starting PO challenge.       Wil Knight, RN  07/17/21 2755

## 2021-07-17 NOTE — H&P
Hospital Medicine  History and Physical    Patient:  Lety Coyle  MRN: [id number]    CHIEF COMPLAINT:    Chief Complaint   Patient presents with    Anorexia     Chronic. Pt was here last week for same thing    Constipation       History Obtained From:  patient, family member - son  Primary Care Physician: NED Ferreira CNP    HISTORY OF PRESENT ILLNESS:   The patient is a 78 y.o. female who presents with constipation with bowel impaction refractory to enemas and manual disimpaction. Lives at home by herself. Recently hospitalized and discharged (7/13) to home with hospice. She refuses to eat or drink by herself. Patient and son are now open to SNF but apparently changes her mind frequently. Past Medical History:      Diagnosis Date    CAD (coronary artery disease)     Cancer ([address] 2016    skin cancer    Carotid artery disease (HCC)     HTN (hypertension)     Hyperlipemia     Hyperlipidemia     LBP (low back pain)     Lung nodules     Osteoporosis     Seasonal allergies     Stress bladder incontinence, female     Unspecified cerebral artery occlusion with cerebral infarction     Wears glasses        Past Surgical History:      Procedure Laterality Date    CARDIAC CATHETERIZATION      COLONOSCOPY  8/13/2014    HERNIA REPAIR Right 6/9/2021    ROBOTIC RIGHT INGUINAL HERNIA REPAIR performed by Gia Barrios MD at [facility] N/A 7/11/2021    EGD BIOPSY performed by Selma Bender MD at [place] [address]  7/11/2021    EGD DILATION SAVORY performed by Selma Bender MD at [facility]         Medications Prior to Admission:    Prior to Admission medications    Medication Sig Start Date End Date Taking? Authorizing Provider   ALPRAZolam (XANAX) 0.25 MG tablet Take 1 tablet by mouth 2 times daily for 30 days.  7/13/21 8/12/21  NED Du CNP cholestyramine (QUESTRAN) 4 g packet Take 1 packet by mouth 2 times daily 7/13/21   NED Mendes CNP   dilTIAZem (CARDIZEM CD) 120 MG extended release capsule Take 1 capsule by mouth daily 7/13/21   NED Mendes CNP   PARoxetine (PAXIL) 20 MG tablet Take 1 tablet by mouth daily 7/2/21   NED Galvez CNP   metoprolol succinate (TOPROL XL) 25 MG extended release tablet Take 1 tablet by mouth nightly 6/13/21   Sofia Thornton MD   clopidogrel (PLAVIX) 75 MG tablet Take 75 mg by mouth daily    Historical Provider, MD   simvastatin (ZOCOR) 20 MG tablet TAKE ONE TABLET BY MOUTH DAILY 1/4/21   NED Galvez CNP   metoprolol succinate (TOPROL XL) 100 MG extended release tablet Take 1 tablet by mouth daily 1/4/21   NED Galvez CNP   spironolactone (ALDACTONE) 25 MG tablet Take 25 mg by mouth daily 7/14/20   Historical Provider, MD   apixaban (ELIQUIS) 2.5 MG TABS tablet Take 1 tablet by mouth 2 times daily 7/8/20   Scarlet Connor MD   acetaminophen (TYLENOL) 500 MG tablet Take 500 mg by mouth every 6 hours as needed for Pain    Historical Provider, MD   glucosamine-chondroitin 500-400 MG tablet Take by mouth 2 times daily     Historical Provider, MD   magnesium (MAGNESIUM-OXIDE) 250 MG TABS tablet Take 250 mg by mouth daily     Historical Provider, MD   Coenzyme Q10 (COQ10) 50 MG CAPS Take by mouth 2 times daily     Historical Provider, MD   vitamin D 25 MCG (1000 UT) CAPS Take 1,000 Units by mouth daily      Historical Provider, MD   therapeutic multivitamin-minerals (THERAGRAN-M) tablet Take 1 tablet by mouth daily      Historical Provider, MD   vitamin E 400 UNIT capsule Take 400 Units by mouth daily      Historical Provider, MD   Calcium Citrate-Vitamin D 200-250 MG-UNIT TABS Take by mouth      Historical Provider, MD   Ascorbic Acid (VITAMIN C) 500 MG tablet Take 1,000 mg by mouth daily      Historical Provider, MD   folic acid (FOLVITE) 1 MG tablet Take 400 mcg by mouth daily      Historical Provider, MD   Zinc 50 MG CAPS Take by mouth      Historical Provider, MD       Allergies:  Pneumococcal vaccines    Social History:   TOBACCO:   reports that she has never smoked. She has never used smokeless tobacco.  ETOH:   reports no history of alcohol use. Family History:       Problem Relation Age of Onset    Heart Disease Sister     Stroke Mother        REVIEW OF SYSTEMS:  Ten systems reviewed and negative except for fatigue. Physical Exam:    Vitals: BP (!) 148/103   Pulse 69   Temp 97.8 °F (36.6 °C) (Oral)   Resp 16   Ht 5' 3\" (1.6 m)   Wt 89 lb (40.4 kg)   SpO2 95%   BMI 15.77 kg/m²   General appearance: alert, appears stated age and cooperative  Skin: Skin color, texture, turgor normal. No rashes or lesions  HEENT: Head: Normocephalic, no lesions, without obvious abnormality. Neck: no adenopathy, no carotid bruit, no JVD, supple, symmetrical, trachea midline and thyroid not enlarged, symmetric, no tenderness/mass/nodules  Lungs: clear to auscultation bilaterally  Heart: regular rate and rhythm, S1, S2 normal, no murmur, click, rub or gallop  Abdomen: soft, non-tender; bowel sounds normal; no masses,  no organomegaly  Extremities: extremities normal, atraumatic, no cyanosis or edema  Neurologic: Mental status: Alert, oriented, thought content appropriate    Recent Labs     07/17/21  0956   WBC 6.3   HGB 12.7        Recent Labs     07/17/21  0956      K 4.7      CO2 26   BUN 23*   CREATININE 0.9   GLUCOSE 95   AST 19   ALT 10   BILITOT 0.5   ALKPHOS 86         URINALYSIS:  Recent Labs     07/17/21  1204   COLORU Yellow   PHUR 7.5   CLARITYU Clear   SPECGRAV 1.020   LEUKOCYTESUR Negative   UROBILINOGEN 0.2   BILIRUBINUR Negative   BLOODU Negative   GLUCOSEU Negative       X-ray abdomen (7/17)  Bowel-gas pattern is normal.  No bowel obstruction evident. Normal amount of stool seen. No free air.   Multiple calcified granulomas within the liver and spleen. No worrisome abnormal calcification. Irregular radiodensity in the rectum presumably something that was ingested. Small arterial stent in place in the proximal celiac axis or SMA. No acute abdominal abnormality identified. Assessment and Plan   1. Failure to thrive with severe protein calorie malnutrition:  Admit to inpatient status. 2. Nausea and vomiting of unclear etiology. Present long prior to recent hernia surgical repair doubt significant post-procedural complication. CT scan reassuring w/out evidence of obstruction but w/ possible seroma/hematom favoured - doubt abscess clinically.  Admission U/A negative. EGD (7/13) showed mild gastritis, presbyesophagus s/p savory dilation 57F, otherwise normal EGD. Gastric reflux and stress ulcer prophylaxis with pantoprazole (Protonix) 40 mg daily. 3. Chronic systolic heart failure (EF 20-25% by echo June 2021). Likely due to ischemic heart disease. Continues on clopidogrel 75 mg daily and spironolactone 25 mg daily. 4. Paroxysmal atrial fibrillation:  Anticoagulation with apixaban 2.5 mg BID  Rate control with diltiazem 120 mg and metoprolol succinate 100 mg daily. 5. Anxious depression:  Continues on alprazolam 0.25 mg BID and NOT paroxetine (Paxil) despite being on the medication list per son. Seen by Psychiatry (Dr. Lisa Chase) last hospitalization. 6. Dyslipidemia:  Continues on statin therapy with atorvastatin 10 mg nightly (formulary equivalent for simvastatin 20 mg nightly). 7. Vitamin D deficiency:  Continues on cholecalciferol (vit D3) 1,000 units (25 mcg) daily. Advance Directive:  Limited (NO to all four questions)  DVT prophylaxis with apixaban 2.5 mg BID (afib)  Discharge planning:  Admit to inpatient status for evaluation and management that will require at least two midnights of acute hospitalization. Case management evaluation for post-acute. [facility].        Aramis Murphy MD, MD  Admitting Hospitalist

## 2021-07-17 NOTE — ED NOTES
Per pt, doesn't want to be admitted until discussed w/ son. Son had stepped out of room. Called son, per Nichole Ibanez, will be back shortly.       Orin Briceño RN  07/17/21 8844

## 2021-07-17 NOTE — ED PROVIDER NOTES
**ADVANCED PRACTICE PROVIDER, I HAVE EVALUATED THIS [facility]  ED  EMERGENCY DEPARTMENT ENCOUNTER      Pt Name: Tatianna Messer  GAF:[id number]  Birthdate 1940  Date of evaluation: 7/17/2021  Provider: MARIANELA Espana      Chief Complaint:    Chief Complaint   Patient presents with    Anorexia     Chronic. Pt was here last week for same thing    Constipation         Nursing Notes, Past Medical Hx, Past Surgical Hx, Social Hx, Allergies, and Family Hx were all reviewed and agreed with or any disagreements were addressed in the HPI.    HPI: (Location, Duration, Timing, Severity, Quality, Assoc Sx, Context, Modifying factors)    Chief Complaint of anorexia, constipation, abdominal pain. This is a  78 y.o. female who presents to the emergency department from home with complaints of abdominal pain and constipation. The patient was recently admitted to the hospital and discharged on 7/13/2021. At that time she was admitted for failure to thrive and was seen by GI, psychiatry, PT/OT, and ultimately hospice. The patient ultimately denied hospice. She was discharged home with home health, however the patient states while she was in the hospital she was constipated and complained of feeling like she was sitting on a hard ball as she was unable to go. She states nothing was done about it and she has been unable to have a bowel movement since then. She states she did attempt to disimpact herself using her hands, but was unsuccessful. She denies any recent fever, chills, nausea, vomiting. She states yesterday she had a boost and that was all she had her food. She states she does drink plenty of water. She has not tried any home treatments.     PastMedical/Surgical History:      Diagnosis Date    CAD (coronary artery disease)     Cancer ([facility] [address] 2016    skin cancer    Carotid artery disease (HCC)     HTN (hypertension)     Hyperlipemia     Hyperlipidemia     LBP (low back pain)     Lung nodules     Osteoporosis     Seasonal allergies     Stress bladder incontinence, female     Unspecified cerebral artery occlusion with cerebral infarction     Wears glasses          Procedure Laterality Date    CARDIAC CATHETERIZATION      COLONOSCOPY  8/13/2014    HERNIA REPAIR Right 6/9/2021    ROBOTIC RIGHT INGUINAL HERNIA REPAIR performed by Audra Sanches MD at [facility] 7/11/2021    EGD BIOPSY performed by Olivia Angeles MD at [place] [address]  7/11/2021    EGD DILATION SAVORY performed by Olivia Angeles MD at [address] [facility]         Medications:  Previous Medications    ACETAMINOPHEN (TYLENOL) 500 MG TABLET    Take 500 mg by mouth every 6 hours as needed for Pain    ALPRAZOLAM (XANAX) 0.25 MG TABLET    Take 1 tablet by mouth 2 times daily for 30 days.     APIXABAN (ELIQUIS) 2.5 MG TABS TABLET    Take 1 tablet by mouth 2 times daily    ASCORBIC ACID (VITAMIN C) 500 MG TABLET    Take 1,000 mg by mouth daily      CALCIUM CITRATE-VITAMIN D 200-250 MG-UNIT TABS    Take by mouth      CHOLESTYRAMINE (QUESTRAN) 4 G PACKET    Take 1 packet by mouth 2 times daily    CLOPIDOGREL (PLAVIX) 75 MG TABLET    Take 75 mg by mouth daily    COENZYME Q10 (COQ10) 50 MG CAPS    Take by mouth 2 times daily     DILTIAZEM (CARDIZEM CD) 120 MG EXTENDED RELEASE CAPSULE    Take 1 capsule by mouth daily    FOLIC ACID (FOLVITE) 1 MG TABLET    Take 400 mcg by mouth daily      GLUCOSAMINE-CHONDROITIN 500-400 MG TABLET    Take by mouth 2 times daily     MAGNESIUM (MAGNESIUM-OXIDE) 250 MG TABS TABLET    Take 250 mg by mouth daily     METOPROLOL SUCCINATE (TOPROL XL) 100 MG EXTENDED RELEASE TABLET    Take 1 tablet by mouth daily    METOPROLOL SUCCINATE (TOPROL XL) 25 MG EXTENDED RELEASE TABLET    Take 1 tablet by mouth nightly    PAROXETINE (PAXIL) 20 MG TABLET    Take 1 tablet by mouth daily    SIMVASTATIN (ZOCOR) 20 MG TABLET    TAKE ONE TABLET BY MOUTH DAILY    SPIRONOLACTONE (ALDACTONE) 25 MG TABLET    Take 25 mg by mouth daily    THERAPEUTIC MULTIVITAMIN-MINERALS (THERAGRAN-M) TABLET    Take 1 tablet by mouth daily      VITAMIN D 25 MCG (1000 UT) CAPS    Take 1,000 Units by mouth daily      VITAMIN E 400 UNIT CAPSULE    Take 400 Units by mouth daily      ZINC 50 MG CAPS    Take by mouth           Review of Systems:  (2-9 systems needed)  Review of Systems   Constitutional: Negative for chills and fever. HENT: Negative for congestion. Eyes: Negative for visual disturbance. Respiratory: Negative for cough, chest tightness and shortness of breath. Cardiovascular: Negative for chest pain and palpitations. Gastrointestinal: Positive for abdominal pain and constipation. Negative for nausea and vomiting. Genitourinary: Negative for dysuria, frequency and urgency. Musculoskeletal: Negative for back pain. Skin: Negative for rash. Neurological: Negative for dizziness, weakness and headaches. \"Positives and Pertinent negatives as per HPI\"    Physical Exam:  Physical Exam  Vitals and nursing note reviewed. Constitutional:       Appearance: She is ill-appearing. She is not diaphoretic. HENT:      Head: Normocephalic and atraumatic. Nose: Nose normal.   Eyes:      General:         Right eye: No discharge. Left eye: No discharge. Extraocular Movements: Extraocular movements intact. Cardiovascular:      Rate and Rhythm: Normal rate and regular rhythm. Pulses: Normal pulses. Heart sounds: Normal heart sounds. No murmur heard. No friction rub. No gallop. Pulmonary:      Effort: Pulmonary effort is normal. No respiratory distress. Breath sounds: Normal breath sounds. No stridor. No wheezing, rhonchi or rales. Abdominal:      General: Abdomen is flat. Bowel sounds are normal.      Tenderness: There is abdominal tenderness.  There is no right CVA tenderness or left CVA tenderness. Genitourinary:     Rectum: Normal.      Comments: Disimpaction attempted, with mild success, followed by soapsuds enema. No notable fissures, hemorrhoids, or bleeding. Musculoskeletal:         General: Normal range of motion. Cervical back: Normal range of motion and neck supple. Skin:     General: Skin is warm and dry. Coloration: Skin is not pale. Neurological:      Mental Status: She is alert and oriented to person, place, and time. Psychiatric:         Mood and Affect: Mood normal.         Behavior: Behavior normal.         MEDICAL DECISION MAKING    Vitals:    Vitals:    07/17/21 1553 07/17/21 1623 07/17/21 1653 07/17/21 1723   BP: (!) 152/74 (!) 166/69 (!) 148/103 (!) 159/84   Pulse:   69    Resp:   16    Temp:       TempSrc:       SpO2: 96% 96% 95% 97%   Weight:       Height:           LABS:  Labs Reviewed   COMPREHENSIVE METABOLIC PANEL - Abnormal; Notable for the following components:       Result Value    BUN 23 (*)     GFR Non- 60 (*)     Albumin 3.3 (*)     Albumin/Globulin Ratio 0.9 (*)     All other components within normal limits    Narrative:     Performed at:  [place] [address] [email or web address]   Phone [phone]   CBC WITH AUTO DIFFERENTIAL    Narrative:     Performed at:  [place] [address] [email or web address]   Phone [phone]   URINALYSIS    Narrative:     Performed at:  [place] [address] [email or web address]   Phone  of labs reviewed and were negative at this time or not returned at the time of this note.     RADIOLOGY:   Non-plain film images such as CT, Ultrasound and MRI are read by the radiologist. MARIANELA Puente have directly visualized the radiologic plain film image(s) with the below findings:      Interpretation per the Radiologist below, if available at the time of this note:    XR ABDOMEN (KUB) (SINGLE AP VIEW)   Final Result   No acute abdominal abnormality identified. XR ABDOMEN (KUB) (SINGLE AP VIEW)    Result Date: 7/17/2021  EXAMINATION: ONE SUPINE XRAY VIEW(S) OF THE ABDOMEN 7/17/2021 10:53 am COMPARISON: 06/07/2021 HISTORY: ORDERING SYSTEM PROVIDED HISTORY: abdominal pain TECHNOLOGIST PROVIDED HISTORY: Reason for exam:->abdominal pain Reason for Exam: constipation Acuity: Chronic Type of Exam: Ongoing Relevant Medical/Surgical History: anorexia, pt seen for same thing last week FINDINGS: Bowel-gas pattern is normal.  No bowel obstruction evident. Normal amount of stool seen. No free air. Multiple calcified granulomas within the liver and spleen. No worrisome abnormal calcification. Irregular radiodensity in the rectum presumably something that was ingested. Small arterial stent in place in the proximal celiac axis or SMA. No acute abdominal abnormality identified. CT ABDOMEN PELVIS W IV CONTRAST Additional Contrast? None    Result Date: 7/7/2021  EXAMINATION: CT OF THE ABDOMEN AND PELVIS WITH CONTRAST 7/7/2021 10:25 am TECHNIQUE: CT of the abdomen and pelvis was performed with the administration of intravenous contrast. Multiplanar reformatted images are provided for review. Dose modulation, iterative reconstruction, and/or weight based adjustment of the mA/kV was utilized to reduce the radiation dose to as low as reasonably achievable.  COMPARISON: 06/06/2021 HISTORY: ORDERING SYSTEM PROVIDED HISTORY: Nausea s/p hernia surgery TECHNOLOGIST PROVIDED HISTORY: Additional Contrast?->None Reason for exam:->Nausea s/p hernia surgery Decision Support Exception - unselect if not a suspected or confirmed emergency medical condition->Emergency Medical Condition (MA) Reason for Exam: nausea, pt states she has had it for \"a long time\", unsure how long, right inguinal hernia repair 6/2021 Acuity: Unknown Type of Exam: Unknown Additional signs and symptoms: no other prev surgery FINDINGS: Lower Chest: There is patchy bibasilar consolidation along with a trace amount of bilateral pleural fluid. Organs: The liver, pancreas, spleen, kidneys and adrenals are unremarkable. GI/Bowel: There is no bowel dilatation, wall thickening or obstruction. Diverticular changes are noted throughout the left hemicolon. The appendix is normal. Pelvis: A small amount of nonspecific fluid is present within the dependent pelvis. The pelvic organs and urinary bladder are unremarkable. Peritoneum/Retroperitoneum: There is no free air or adenopathy. There is a small rim enhancing fluid collection within the right lower quadrant just beneath the skin surface/inferior epigastric vessels and anterior to the right external iliac vessels. This measures 2.9 cm in diameter. Bones/Soft Tissues: There is no acute fracture or aggressive osseous lesion. There is a small collection of gas within the left lower quadrant abdominal wall subcutaneous tissues and right inguinal canal, likely related to recent hernia repair. 1. Small nonspecific right lower quadrant rim enhancing fluid collection. The differential includes seroma, hematoma and abscess. 2. Diverticulosis. 3. Mild bibasilar atelectasis versus pneumonia. FL ESOPHAGRAM    Result Date: 7/11/2021  EXAMINATION: DOUBLE CONTRAST ESOPHAGRAM 7/9/2021 10:38 am TECHNIQUE: Double contrast esophagram was performed with barium and air contrast. FLUOROSCOPY DOSE AND TYPE OR TIME AND EXPOSURES: Fluoroscopic time: 2.9 minutes. Number of fluoroscopic images obtained:  0. Fluoroscopic images that were obtained were generated using last image hold technique. COMPARISON: CT of the abdomen/pelvis 07/07/2021.  HISTORY: ORDERING SYSTEM PROVIDED HISTORY: N/V w/ possible dysphagia TECHNOLOGIST PROVIDED HISTORY: Reason for exam:->N/V w/ possible dysphagia FINDINGS: Technically, the study was very difficult to perform due to patient's limited ability to cooperate. The study was performed with patient in relatively supine/RPO position. As visualized, deglutition is normal.  There is a short segmental area marked luminal narrowing of the proximal esophagus near the level of the thoracic inlet. There is esophageal dysmotility with disordered primary peristalsis and visualization of tertiary contractions during the study. There is mild smooth luminal narrowing of the distal esophagus above the level of the GE junction. 1. Short segmental area of marked luminal narrowing of the proximal esophagus near the level of the thoracic inlet. Exact etiology of the finding is indeterminate. Follow-up endoscopy is recommended for a more complete evaluation. 2. Esophageal dysmotility with disordered primary peristalsis and visualization of tertiary contractions as described. 3. Mild nonspecific smooth luminal narrowing of the distal esophagus. Finding may in part be related to esophageal dysmotility although true luminal narrowing including changes of subtle stricture involving the distal esophagus also in the differential diagnosis. US GALLBLADDER RUQ    Result Date: 7/13/2021  EXAMINATION: RIGHT UPPER QUADRANT ULTRASOUND 7/13/2021 10:41 am COMPARISON: None. HISTORY: ORDERING SYSTEM PROVIDED HISTORY: nausea TECHNOLOGIST PROVIDED HISTORY: Reason for exam:->nausea FINDINGS: LIVER:  The liver demonstrates normal echogenicity without evidence of intrahepatic biliary ductal dilatation. BILIARY SYSTEM:  Gallbladder is limited evaluation with no distention. No evidence of shadowing stones or pericholecystic fluid. Without evidence of pericholecystic fluid, wall thickening or stones. Negative sonographic Mane's sign. Common bile duct is within normal limits measuring 1.6 mm. RIGHT KIDNEY: The right kidney is grossly unremarkable without evidence of hydronephrosis.  PANCREAS:  Visualized portions of the pancreas are unremarkable. OTHER: No evidence of right upper quadrant ascites. Unremarkable right upper quadrant ultrasound. XR CHEST PORTABLE    Result Date: 7/7/2021  EXAMINATION: ONE XRAY VIEW OF THE CHEST 7/7/2021 9:42 am COMPARISON: 06/11/2021 HISTORY: ORDERING SYSTEM PROVIDED HISTORY: Nausea TECHNOLOGIST PROVIDED HISTORY: Reason for exam:->Nausea Reason for Exam: anorexia Acuity: Acute Type of Exam: Initial Additional signs and symptoms: anorexia for a year unable to eat due to nausea FINDINGS: Cardiac leads project over the chest.  Biapical pleuroparenchymal thickening is similar to prior. Dense nodule within the right mid lung is similar to prior, likely granuloma. Dense nodules within the left mid lung are also similar to prior. Blunting of the costophrenic angles. Coarse parenchymal pattern bilaterally is similar to prior. No gross pneumothorax. Cardiac and mediastinal silhouettes are unchanged. Trace pleural effusions. Sequela of granulomatous disease. Unchanged coarse parenchymal opacity, suggestive of chronic lung disease. MEDICAL DECISION MAKING / ED COURSE:      The patient was seen and evaluated by myself and attending physician. All diagnostic, treatment, and disposition decisions were made in conjunction with attending physician. Patient was given:  Medications   sodium phosphate (FLEET) enema 1 enema (has no administration in time range)       Patient presented with concerns of constipation. Physical exam is notable for ill-appearing, severely malnourished and underweight patient with minimal abdominal tenderness. Normal bowel sounds. KUB shows no acute abdominal abnormality, but consistent findings with constipation. I did attempt a disimpaction which was mildly successful, followed by soapsuds enema. Per nursing staff the patient did have small bowel movement, but is still complaining of pain. She will be given a Fleet enema.   The patient also is complaining that she does not feel safe at home and lives by herself, she does not feel as if she can walk. She states she feels she has developed increased weakness since her most recent hospital stay where she was discharged on . A discussion was had with the patient regarding hospice care, she states she decided against hospice, but at this time is open to skilled nursing facility. A lengthy discussion was had with the patient's son, Yesenia Gutiérrez who expressed that the patient is reluctant to eat or drink at home, and is requesting 24-hour care. However he states they do attempt to have family and friends visit frequently and the patient refuses their visits. we discussed that the patient failure to thrive, I do not feel discharge home by herself is appropriate at this time. I think skilled nursing facility would be of more interest to the patient. Her son does state that his father  3 years ago as of today, which could be contributing to the patient's behavior. He states  he and his family are very perplexed as to how to best care for the patient and she refuses most care. He agrees that admission at this time would be most beneficial to the patient. Hospitalist is consulted at this time. We will plan for admission with PT, OT, and social work consult. The patient tolerated their visit well. I evaluated the patient. The physician was available for consultation as needed. The patient and / or the family were informed of the results of any tests, a time was given to answer questions, a plan was proposed and they agreed with plan. CLINICAL IMPRESSION:  1. Constipation, unspecified constipation type    2. Failure to thrive in adult        DISPOSITION Decision To Admit 2021 05:14:56 PM      PATIENT REFERRED TO:  No follow-up provider specified.     DISCHARGE MEDICATIONS:  New Prescriptions    No medications on file       DISCONTINUED MEDICATIONS:  Discontinued Medications    No medications on file (Please note the MDM and HPI sections of this note were completed with a voice recognition program.  Efforts were made to edit the dictations but occasionally words are mis-transcribed.)    Electronically signed, Ivana Garner,           Ivana Garner  07/17/21 [id number]

## 2021-07-17 NOTE — ED NOTES
Bedside report given to Jyothi Matson RN. Pt transported by bed.       [facility] JUN Mariscal  07/17/21 1957

## 2021-07-17 NOTE — ED PROVIDER NOTES
Emergency Department Provider Note     Location: [facility]  ED  7/17/2021    I independently performed a history and physical on Nery Robledo. All diagnostic, treatment, and disposition decisions were made by myself in conjunction with the mid-level provider. Briefly, this is a 78 y.o. female here for constipation and weakness    ED Triage Vitals   BP Temp Temp Source Pulse Resp SpO2 Height Weight   07/17/21 0954 07/17/21 0954 07/17/21 0954 07/17/21 0954 07/17/21 0954 07/17/21 0954 07/17/21 0950 07/17/21 0950   (!) 148/78 97.8 °F (36.6 °C) Oral 58 16 97 % 5' 3\" (1.6 m) 89 lb (40.4 kg)        Patient resting comfortably in no acute distress. Appears frail Heart is regular rate and rhythm. Lungs clear to auscultation bilaterally. Abdomen is soft, nondistended, and nontender. No peripheral edema noted. Patient seen and examined.          Results for orders placed or performed during the hospital encounter of 07/17/21   CBC Auto Differential   Result Value Ref Range    WBC 6.3 4.0 - 11.0 K/uL    RBC 4.02 4.00 - 5.20 M/uL    Hemoglobin 12.7 12.0 - 16.0 g/dL    Hematocrit 37.7 36.0 - 48.0 %    MCV 93.7 80.0 - 100.0 fL    MCH 31.6 26.0 - 34.0 pg    MCHC 33.7 31.0 - 36.0 g/dL    RDW 14.4 12.4 - 15.4 %    Platelets 663 087 - 390 K/uL    MPV 6.7 5.0 - 10.5 fL    Neutrophils % 71.3 %    Lymphocytes % 18.6 %    Monocytes % 6.5 %    Eosinophils % 2.9 %    Basophils % 0.7 %    Neutrophils Absolute 4.5 1.7 - 7.7 K/uL    Lymphocytes Absolute 1.2 1.0 - 5.1 K/uL    Monocytes Absolute 0.4 0.0 - 1.3 K/uL    Eosinophils Absolute 0.2 0.0 - 0.6 K/uL    Basophils Absolute 0.0 0.0 - 0.2 K/uL   Comprehensive Metabolic Panel   Result Value Ref Range    Sodium 139 136 - 145 mmol/L    Potassium 4.7 3.5 - 5.1 mmol/L    Chloride 103 99 - 110 mmol/L    CO2 26 21 - 32 mmol/L    Anion Gap 10 3 - 16    Glucose 95 70 - 99 mg/dL    BUN 23 (H) 7 - 20 mg/dL    CREATININE 0.9 0.6 - 1.2 mg/dL    GFR Non-African American 60 (A) >60    GFR African American >60 >60    Calcium 10.3 8.3 - 10.6 mg/dL    Total Protein 6.9 6.4 - 8.2 g/dL    Albumin 3.3 (L) 3.4 - 5.0 g/dL    Albumin/Globulin Ratio 0.9 (L) 1.1 - 2.2    Total Bilirubin 0.5 0.0 - 1.0 mg/dL    Alkaline Phosphatase 86 40 - 129 U/L    ALT 10 10 - 40 U/L    AST 19 15 - 37 U/L    Globulin 3.6 g/dL       For further details of Jayy Magallanes emergency department encounter, please see MARIANELA Briones's documentation. 1. Constipation, unspecified constipation type    2. Failure to thrive in adult        This chart was generated in part by using Dragon Dictation system and may contain errors related to that system including errors in grammar, punctuation, and spelling, as well as words and phrases that may be inappropriate. If there are any questions or concerns please feel free to contact the dictating provider for clarification.            Zo Vallejo MD  07/20/21 [id number]

## 2021-07-18 NOTE — DISCHARGE INSTR - COC
Continuity of Care Form    Patient Name: Sim Miller   :  1940  MRN:  [id number]    Admit date:  2021  Discharge date:  2021    Code Status Order: Limited   Advance Directives:      Admitting Physician:  Smith Padilla MD  PCP: NED Iyer CNP    Discharging Nurse: Chelsea Cruz Unit/Room#: 0215/0215-01  Discharging Unit Phone Number:     Emergency Contact:   Extended Emergency Contact Information  Primary Emergency Contact: Gerhardt Bowling [facility] [address] [facility] Phone: [phone]  Mobile Phone: [phone]  Relation: Child  Secondary Emergency Contact: JameTanvi  Address: [address] [place] [address], [address] [place] [address] [address] [facility] Phone: [phone]  Work Phone: [phone]  Relation: Child    Past Surgical History:  Past Surgical History:   Procedure Laterality Date    CARDIAC CATHETERIZATION      COLONOSCOPY  2014    HERNIA REPAIR Right 2021    ROBOTIC RIGHT INGUINAL HERNIA REPAIR performed by Ash Guardado MD at [facility] 2021    EGD BIOPSY performed by Jenelle Roberts MD at [place] [address]  2021    EGD DILATION SAVORY performed by Jenelle Roberts MD at [facility]         Immunization History:   Immunization History   Administered Date(s) Administered    Influenza 10/29/2012    Influenza Virus Vaccine 10/29/2003, 10/05/2005, 2007, 2008, 10/27/2010, 10/27/2010, 10/18/2011, 2011, 2020    Influenza, High Dose (Fluzone 65 yrs and older) 10/29/2014, 2015, 2017    Pneumococcal Conjugate 13-valent (Lonell Hayde) 05/10/2016    Pneumococcal Polysaccharide (Plsienbfk01) 2002    Zoster Live (Zostavax) 2010       Active Problems:  Patient Active Problem List   Diagnosis Code    HTN (hypertension) I10  Hyperlipidemia E78.5    Low back pain M54.5    Stress bladder incontinence, female N39.3    Seasonal allergies J30.2    CAD (coronary artery disease) I25.10    Carotid artery disease I77.9    Wears glasses Z97.3    Lung nodules R91.8    Aneurysm of thoracic aorta (HCC) I71.2    Menopausal osteoporosis M81.0    Essential hypertension I10    Coronary artery calcification I25.10, I25.84    Bronchiectasis without complication (HCC) D98.8    A-fib (HCC) I48.91    Paroxysmal atrial fibrillation with rapid ventricular response ([facility]) I48.0    Incarcerated right inguinal hernia K40.30    SBO (small bowel obstruction) ([address] K56.609    PAD (peripheral artery disease) (HCC) I73.9    Celiac artery stenosis (HCC) I77.4    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    N&V (nausea and vomiting) R11.2    Failure to thrive in adult R62.7       Isolation/Infection:   Isolation            No Isolation          Patient Infection Status       Infection Onset Added Last Indicated Last Indicated By Review Planned Expiration Resolved Resolved By    None active    Resolved    COVID-19 Rule Out 06/13/21 06/13/21 06/13/21 COVID-19, Rapid (Ordered)   06/13/21 Rule-Out Test Resulted    COVID-19 Rule Out 06/09/21 06/09/21 06/09/21 COVID-19, Rapid (Ordered)   06/09/21 Rule-Out Test Resulted            Nurse Assessment:  Last Vital Signs: BP (!) 144/76   Pulse 51   Temp 97.5 °F (36.4 °C) (Oral)   Resp 16   Ht 5' 3\" (1.6 m)   Wt 83 lb 5.3 oz (37.8 kg)   SpO2 95%   BMI 14.76 kg/m²     Last documented pain score (0-10 scale): Pain Level: 4  Last Weight:   Wt Readings from Last 1 Encounters:   07/17/21 83 lb 5.3 oz (37.8 kg)     Mental Status:  oriented and alert    IV Access:  - None    Nursing Mobility/ADLs:  Walking   Dependent  Transfer  Dependent  Bathing  Dependent  Dressing  Dependent  Toileting  Dependent  Feeding  {CHP DME ECWY:[id number]}  Med Admin  Dependent  Med Delivery   none    Wound Care Documentation and Therapy:  Wound 07/17/21 Sacrum Stage II (Active)   Number of days: 0        Elimination:  Continence:   · Bowel: No  · Bladder: No  Urinary Catheter: None   Colostomy/Ileostomy/Ileal Conduit: No       Date of Last BM: ***    Intake/Output Summary (Last 24 hours) at 7/18/2021 1450  Last data filed at 7/18/2021 1040  Gross per 24 hour   Intake 773.43 ml   Output --   Net 773.43 ml     I/O last 3 completed shifts: In: 653.4 [I.V.:653.4]  Out: -     Safety Concerns:     History of Falls (last 30 days), At Risk for Falls and Aspiration Risk    Impairments/Disabilities:      None    Nutrition Therapy:  Current Nutrition Therapy:   - Oral Diet:  General    Routes of Feeding: Oral and None  Liquids: Thin Liquids  Daily Fluid Restriction: no  Last Modified Barium Swallow with Video (Video Swallowing Test): done on 7/22/2021/***    Treatments at the Time of Hospital Discharge:   Respiratory Treatments: ***  Oxygen Therapy:  is not on home oxygen therapy. Ventilator:    - No ventilator support    Rehab Therapies: Physical Therapy, Occupational Therapy and Speech/Language Therapy  Weight Bearing Status/Restrictions: No weight bearing restirctions  Other Medical Equipment (for information only, NOT a DME order):  walker  Other Treatments:     Patient's personal belongings (please select all that are sent with patient): All belongings given to EMS at time of discharge.      RN SIGNATURE:  Electronically signed by Royer Bustamante RN on 7/23/21 at 2:27 PM EDT    CASE MANAGEMENT/SOCIAL WORK SECTION    Inpatient Status Date: 7/17 IP    Readmission Risk Assessment Score:  Readmission Risk              Risk of Unplanned Readmission:  15           Discharging to Facility/ Agency   · Name: [facility]  · Address:  · Phone:  · Fax:    Dialysis Facility (if applicable)   · Name:  · Address:  · Dialysis Schedule:  · Phone:  · Fax:    / signature: Electronically signed by Neda Barnes RN on 7/23/21 at 2:09 PM EDT    PHYSICIAN SECTION    Prognosis: Good    Condition at Discharge: Stable    Rehab Potential (if transferring to Rehab): Good    Recommended Labs or Other Treatments After Discharge: None    Physician Certification: I certify the above information and transfer of Mir Shaver  is necessary for the continuing treatment of the diagnosis listed and that she requires [place] for less 30 days.      Update Admission H&P: No change in H&P    PHYSICIAN SIGNATURE:  Electronically signed by Marcelle Grijalva MD on 7/23/21 at 1:13 PM EDT

## 2021-07-18 NOTE — PROGRESS NOTES
Patient admitted to room 215 from ED. Patient oriented to room, call light, bed rails, phone, lights and bathroom. Patient instructed about the schedule of the day including: vital sign frequency, lab draws, possible tests, frequency of MD and staff rounds, including RN/MD rounding together at bedside, daily weights, and I &O's. Patient instructed about prescribed diet, how to use 8MENU, and television. bed alarm in place, patient aware of placement and reason. Telemetry box in place, patient aware of placement and reason. Bed locked, in lowest position, side rails up 2/4, call light within reach. Will continue to monitor.

## 2021-07-18 NOTE — PROGRESS NOTES
Physical Therapy    Facility/Department: [facility] CARD TELEMETRY  Initial Assessment/Treatment    NAME: Génesis Jorgensen  : 1940  MRN: [id number]    Date of Service: 2021    Discharge Recommendations:  Subacute/Skilled Nursing Facility   PT Equipment Recommendations  Equipment Needed: No  Other: defer to next level of care    Assessment   Body structures, Functions, Activity limitations: Decreased functional mobility ; Increased pain;Decreased balance;Decreased posture;Decreased strength;Decreased safe awareness;Decreased endurance  Assessment: Pt is an 78 y.o. female admitted to [facility] secondary to FTT. Pt lives alone in two story home with 2 BULL and is typically I with functional mobility and gait. Pt is currently functioning below her baseline requring CGA for t/f and CGA for gait up to 140' with RW. Pt is limited by decreased strength, decreased balance and decreased activity tolerance. PT will benefit from continued skilled PT in acute care setting to address above deficits. D/t pts deficits and no 24 hr assist available at d/c recommend SNF at d/c. Treatment Diagnosis: Impaired balance and gait  Specific instructions for Next Treatment: Progress mobility as tolerated  Prognosis: Good  Decision Making: Medium Complexity  PT Education: Goals; General Safety;Gait Training;PT Role;Disease Specific Education;Plan of Care; Functional Mobility Training;Home Exercise Program;Precautions; Injury Prevention;Transfer Training  Patient Education: Importance of OOB mobility and gait, safe use of AD. Safe techniques with functional mobility. Pt verbalized understanding  Barriers to Learning: None  REQUIRES PT FOLLOW UP: Yes  Activity Tolerance  Activity Tolerance: Patient limited by fatigue;Patient limited by pain; Patient limited by endurance  Activity Tolerance: /82, HR 52, SpO2 98% on RA       Patient Diagnosis(es): The primary encounter diagnosis was Constipation, unspecified constipation type.  A diagnosis of Failure to thrive in adult was also pertinent to this visit. has a past medical history of CAD (coronary artery disease), Cancer ([address] Carotid artery disease ([address] HTN (hypertension), Hyperlipemia, Hyperlipidemia, LBP (low back pain), Lung nodules, Osteoporosis, Seasonal allergies, Stress bladder incontinence, female, Unspecified cerebral artery occlusion with cerebral infarction, and Wears glasses. has a past surgical history that includes Varicose vein surgery; Cardiac catheterization; Colonoscopy (8/13/2014); hernia repair (Right, 6/9/2021); Upper gastrointestinal endoscopy (N/A, 7/11/2021); and Upper gastrointestinal endoscopy (7/11/2021). Restrictions  Restrictions/Precautions  Restrictions/Precautions: Fall Risk  Required Braces or Orthoses?: No  Position Activity Restriction  Other position/activity restrictions:  Up with assist  Vision/Hearing  Vision: Impaired  Vision Exceptions: Wears glasses at all times  Hearing: Within functional limits     Subjective  General  Chart Reviewed: Yes  Patient assessed for rehabilitation services?: Yes  Response To Previous Treatment: Not applicable  Family / Caregiver Present: No  Referring Practitioner: Fifi Salinas MD  Referral Date : 07/18/21  Diagnosis: Failure to Thrive  Follows Commands: Within Functional Limits  General Comment  Comments: RN cleared pt for session  Subjective  Subjective: Pt seated in recliner on approach, pleasant and agreeable to PT evalaution  Pain Screening  Patient Currently in Pain: Denies          Orientation  Orientation  Overall Orientation Status: Within Functional Limits  Social/Functional History  Social/Functional History  Lives With: Alone  Type of Home: House  Home Layout: Two level, Able to Live on Main level with bedroom/bathroom  Home Access: Stairs to enter with rails  Entrance Stairs - Number of Steps: 2  Entrance Stairs - Rails: Left  Bathroom Shower/Tub: Walk-in shower  Bathroom Toilet: Standard  Bathroom Progress mobility as tolerated  Current Treatment Recommendations: Strengthening, Home Exercise Program, Safety Education & Training, Balance Training, Endurance Training, Patient/Caregiver Education & Training, Functional Mobility Training, Transfer Training, Gait Training, Stair training, Positioning  Safety Devices  Type of devices: All fall risk precautions in place, Left in chair, Call light within reach, Chair alarm in place, Nurse notified, Gait belt, Patient at risk for falls    AM-PAC Score  AM-PAC Inpatient Mobility Raw Score : 17 (07/18/21 1810)  AM-PAC Inpatient T-Scale Score : 42.13 (07/18/21 1810)  Mobility Inpatient CMS 0-100% Score: 50.57 (07/18/21 1810)  Mobility Inpatient CMS G-Code Modifier : CK (07/18/21 1810)          Goals  Short term goals  Time Frame for Short term goals: 1 week 7/25/21 (unless otherwise specified)  Short term goal 1: Pt will complete supine to/from sit with I  Short term goal 2: Pt will complete sit to/from stand with LRAD with S  Short term goal 3: Pt will ambulate >80' with LRAD with S without LOB  Short term goal 4: Pt will ascend/descend 2 steps with HR with SBA without LOB  Short term goal 5: 7/22/21: Pt will participate in 12-15 reps BLE exercises to increase strength and increase I with functional mobility  Patient Goals   Patient goals : \"Get stronger\"       Therapy Time   Individual Concurrent Group Co-treatment   Time In 1526         Time Out 1559         Minutes 33         Timed Code Treatment Minutes: 23 Minutes (10 minutes for eval)     If pt is unable to be seen after this session, please let this note serve as discharge summary. Please see case management note for discharge disposition. Thank you.     Waqas Marmolejo, PT, DPT

## 2021-07-18 NOTE — PLAN OF CARE
Problem: Falls - Risk of:  Goal: Will remain free from falls  Description: Will remain free from falls  Outcome: Ongoing     Problem: Skin Integrity:  Goal: Will show no infection signs and symptoms  Description: Will show no infection signs and symptoms  Outcome: Ongoing     Problem: Nutrition Deficit:  Goal: Ability to achieve adequate nutritional intake will improve  Description: Ability to achieve adequate nutritional intake will improve  Outcome: Ongoing

## 2021-07-18 NOTE — PROGRESS NOTES
Occupational Therapy   Occupational Therapy Initial Assessment  Date: 2021   Patient Name: Gideon Dunlap  MRN: [id number]     : 1940    Date of Service: 2021    Discharge Recommendations:  Subacute/Skilled Nursing Facility   Barriers to home discharge:   [x] Steps to access home entry or bed/bath:   [x] Reported available assist at home upon discharge limited           Assessment   Performance deficits / Impairments: Decreased functional mobility ; Decreased safe awareness;Decreased balance;Decreased ADL status; Decreased high-level IADLs;Decreased strength;Decreased posture  Assessment: pt reports normally independent with BADL's & functional mobility with RW or cane,  presents with generalize weakness, requiring min assist with bathroom mobility with RW for safety & mod assist with LE self care; pt to benefit from skilled OT services  OT Education: OT Role;Precautions;Plan of Care  Patient Education: disease specific:  importance of OOB to chair for meals & ADL's & walking to bathroom with A of staff; use of nurse call light for A with ADL needs & transfers  Barriers to Learning: cognition/anxiety  REQUIRES OT FOLLOW UP: Yes  Activity Tolerance  Activity Tolerance: Patient Tolerated treatment well  Activity Tolerance: high swartz's in bed:  BP = 125/69, HR = 51, 97 % O 2 sats on RA; seated in chair after bathroom mobility/toileting:  BP = 132/85, HR = 66, RN notified  Safety Devices  Safety Devices in place: Yes  Type of devices: Call light within reach; Chair alarm in place; Left in chair;Nurse notified           Patient Diagnosis(es): The primary encounter diagnosis was Constipation, unspecified constipation type. A diagnosis of Failure to thrive in adult was also pertinent to this visit.      has a past medical history of CAD (coronary artery disease), Cancer ([facility] Carotid artery disease ([facility] HTN (hypertension), Hyperlipemia, Hyperlipidemia, LBP (low back pain), Lung nodules, Osteoporosis, Seasonal allergies, Stress bladder incontinence, female, Unspecified cerebral artery occlusion with cerebral infarction, and Wears glasses. has a past surgical history that includes Varicose vein surgery; Cardiac catheterization; Colonoscopy (8/13/2014); hernia repair (Right, 6/9/2021); Upper gastrointestinal endoscopy (N/A, 7/11/2021); and Upper gastrointestinal endoscopy (7/11/2021).            Restrictions  IV, High fall risk, telemetry   Subjective        Social/Functional History  Social/Functional History  Lives With: Alone  Type of Home: House  Home Layout: Two level, Able to Live on Main level with bedroom/bathroom  Home Access: Stairs to enter with rails  Entrance Stairs - Number of Steps: 2  Entrance Stairs - Rails: Left  Bathroom Shower/Tub: Walk-in shower  Bathroom Toilet: Standard  Bathroom Equipment: Grab bars in shower, Shower chair, Grab bars around toilet  Home Equipment: Roslyn Mooney, Thai, Rolling walker  Receives Help From: Family  ADL Assistance: Independent  Homemaking Responsibilities: Yes  Meal Prep Responsibility: Primary  Ambulation Assistance: Independent (with RW)  Active : No  Occupation: Retired  Type of occupation:   Leisure & Hobbies: follow local sports, watch golf       Objective   Vision: Impaired  Vision Exceptions: Wears glasses at all times  Hearing: Within functional limits    Orientation  Overall Orientation Status: Within Functional Limits     Balance  Sitting Balance: Supervision  Standing Balance: Minimal assistance (to manage RW safely, tends to let go & reach for grab bars  door frames before getting aligned with seat)  Standing Balance  Comment: poor safety with walker, small narrow base of support with severe kyphosis  Functional Mobility  Functional - Mobility Device: Rolling Walker  Activity: To/from bathroom  Assist Level: Minimal assistance  Toilet Transfers  Toilet - Technique: Ambulating (min assist with RW)  Equipment Used: Raised toilet seat with rails  Toilet Transfer: Minimal assistance  ADL  Feeding: Independent  Grooming: Contact guard assistance (standing at sink to wash hands after toileting)  UE Dressing: Maximum assistance  LE Dressing: Minimal assistance  Toileting: Supervision    RUE Tone: Normotonic  LUE Tone: Normotonic     Bed mobility  Supine to Sit: Minimal assistance  Sit to Supine: Unable to assess (Left up in chair upon exiting)  Scooting: Modified independent  Transfers  Sit to stand: Minimal assistance  Stand to sit: Minimal assistance     Vision - Basic Assessment  Prior Vision: Wears glasses all the time     Cognition  Overall Cognitive Status: Exceptions (anxious but cooperative)  Arousal/Alertness: Appropriate responses to stimuli  Following Commands:  Follows one step commands with increased time  Attention Span: Attends with cues to redirect  Memory: Decreased recall of precautions  Safety Judgement: Decreased awareness of need for safety;Decreased awareness of need for assistance  Insights: Decreased awareness of deficits  Initiation: Requires cues for some  Sequencing: Does not require cues             Plan   Plan  Times per week: 3-5x/ week  Current Treatment Recommendations: Strengthening, Balance Training, Functional Mobility Training, Safety Education & Training, Positioning, Self-Care / ADL, Endurance Training    AM-PAC Score        [facility] Inpatient Daily Activity Raw Score: 15 (07/18/21 1536)  AM-PAC Inpatient ADL T-Scale Score : 34.69 (07/18/21 1536)  ADL Inpatient CMS 0-100% Score: 56.46 (07/18/21 1536)  ADL Inpatient CMS G-Code Modifier : CK (07/18/21 1536)    Goals  Short term goals  Time Frame for Short term goals: 1 week(7-25-21)  Short term goal 1: SBA with bathroom mobility by 7-25-21  Short term goal 2: SBA standing ADL's for 3 minutes  Short term goal 3: CGA for LE dressing by 7-24-21  Short term goal 4: tolerate 15 reps BUE AROM exercises  Patient Goals   Patient goals : get stronger Therapy Time   Individual Concurrent Group Co-treatment   Time In 1425         Time Out 176 Vania Baez         Minutes [address]

## 2021-07-18 NOTE — PROGRESS NOTES
cooperative with exam  Lungs: clear to auscultation bilaterally  Heart: regular rate and rhythm, S1, S2 normal, no murmur, click, rub or gallop  Abdomen: diffuse tender, benign with audible bowel sounds  Extremities: extremities normal, atraumatic, no cyanosis or edema  Neurologic: No obvious focal neurologic deficits. Assessment and Plan:   1. Failure to thrive with severe protein calorie malnutrition:  Dietician evaluation for nutritional supplements and support. 2. Nausea and vomiting of unclear etiology. Present prior to recent hernia surgical repair so doubt significant post-procedural complication. CT scan reassuring w/out evidence of obstruction but w/ possible seroma/hematom favoured - doubt abscess clinically.  Admission U/A negative. EGD (7/13) showed mild gastritis, presbyesophagus s/p savory dilation 57F, otherwise normal EGD. Gastric reflux and stress ulcer prophylaxis with pantoprazole (Protonix) 40 mg daily. 3. Chronic systolic heart failure (EF 20-25% by echo June 2021). Likely due to ischemic heart disease. Continues on clopidogrel 75 mg daily and spironolactone 25 mg daily. 4. Paroxysmal atrial fibrillation:  Anticoagulation with apixaban 2.5 mg BID  Rate control with diltiazem 120 mg and metoprolol succinate 100 mg daily. 5. Anxious depression:  Continues on alprazolam 0.25 mg BID but NOT paroxetine (Paxil) despite being on the medication list per son. Seen by Psychiatry (Dr. Yolanda Junior) last hospitalization. 6. Dyslipidemia:  Continues on statin therapy with atorvastatin 10 mg nightly (formulary equivalent for simvastatin 20 mg nightly). 7. Vitamin D deficiency:  Continues on cholecalciferol (vit D3) 1,000 units (25 mcg) daily. 8. Stage II sacrum wound:   Followed by wound care with topical therapy.     Advance Directive:  Limited (NO to all four questions)  DVT prophylaxis with apixaban 2.5 mg BID (afib)  Discharge planning:  will need post-acute care due to inability to be cared for at home. Patient and family have been resistant to this option but amenable at this time.       Elijah Horan MD, MD  Rounding Hospitalist

## 2021-07-18 NOTE — CARE COORDINATION
CASE MANAGEMENT INITIAL ASSESSMENT      Reviewed chart and completed assessment via telephone with: pt and pt son Farrukh Hancock  Explained Case Management role/services. Primary contact information:  Health Care Decision Maker :   Primary Decision MakerYoseph Carpenter Child - [phone]    Secondary Decision Maker: Kelly Howe - Child - [phone]    Supplemental (Other) Decision Maker: Marilyn Chandra - Child - [phone]        Can this person be reached and be able to respond quickly, such as within a few minutes or hours? Yes    Admit date/status: 7/17 IP  Diagnosis: constipation   Is this a Readmission?:  Yes      Insurance:Medicare primary and [employer] [place] secondary   Precert required for SNF: No       3 night stay required: Yes    Living arrangements, Adls, care needs, prior to admission:Lives alone,  in 2 story home, main floor living. Pt has mostly been physcially capable of caring for herself but son Farrukh Hancock says she will stop performing ADLs and desires 24/7 care/company but so far not willing to pay for a caregiver and children are unable to provide. Transportation: d    [address] at home:  Walker_x_Cane_x_RTS__ BSC__Shower Chair__  02__ HHN__ CPAP__  BiPap__  Hospital Bed__ W/C__x_ Other__________    Services in the home and/or outpatient, prior to admission: Has been seeing PT with [facility] but is angry at [facility], unsure if she will allow her to return. Dialysis Facility (if applicable) N/A    PT/OT recs: none available    Hospital Exemption Notification (HEN): not initiated    Barriers to discharge: none    Plan/comments: Spoke to pt via telephone and asked her goals for d/c. Pt states she would like to go home and resume HHC but feels she physically cannot care for herself. If PT/OT recs are for SNF, pt wants referral to [facility]. Placed palliative care consult to clarify goals of care n the event pt can be seen before d/c. DCP following. ECOC on chart for MD signature

## 2021-07-19 PROBLEM — E43 SEVERE MALNUTRITION (HCC): Chronic | Status: ACTIVE | Noted: 2021-01-01

## 2021-07-19 NOTE — CONSULTS
Comprehensive Nutrition Assessment    Type and Reason for Visit:  Initial, Positive Nutrition Screen, Consult    Nutrition Recommendations/Plan:   1. Continue general diet - textures and consistencies per SLP   2. Add Ensure and Magic Cups TID to promote nutrition - vanilla   3. Encourage PO intakes   4. Assist with meals as needed   5. Monitor nutrition adequacy, pertinent labs, bowel habits, wt changes, and clinical progress    Nutrition Assessment:  Pt admitted with adult failure to thrive. Severe malnutrition AEB weight loss, poor PO intakes and physical exam. Pt reports decreased PO intakes PTA d/t nausea. Pt does reports drinking ONS at home, will add. SLP recommending soft and bite sized diet with thin liquids. Pt denies any difficulty chewing/swallowing. Palliative care consulted. Encouraged PO intakes. Pt appears SOB during discussion, denied difficulty eating. Will continue to monitor. Malnutrition Assessment:  Malnutrition Status:  Severe malnutrition    Context:  Chronic Illness     Findings of the 6 clinical characteristics of malnutrition:  Energy Intake:  7 - 75% or less estimated energy requirements for 1 month or longer  Weight Loss:  7 - Greater than 5% over 1 month     Body Fat Loss:  7 - Severe body fat loss Orbital, Buccal region   Muscle Mass Loss:  7 - Severe muscle mass loss Temples (temporalis), Clavicles (pectoralis & deltoids)    Estimated Daily Nutrient Needs:  Energy (kcal):  8119-2997 kcal; Weight Used for Energy Requirements:  Ideal (53 kg)     Protein (g):  63-80 g; Weight Used for Protein Requirements:  Ideal (1.2-1.5 g/kg)        Fluid (ml/day):   ; Method Used for Fluid Requirements:  1 ml/kcal      Nutrition Related Findings:  7.8% weight loss in 1 month. Appears frail. IVF at 75 mL/hr. Nausea and constipation. Wounds:  Pressure Injury, Stage II       Current Nutrition Therapies:    ADULT DIET;  Dysphagia - Soft and Bite Sized  Adult Oral Nutrition Supplement; Frozen Oral Supplement  Adult Oral Nutrition Supplement; Standard High Calorie/High Protein Oral Supplement    Anthropometric Measures:  · Height: 5' 3\" (160 cm)  · Current Body Weight: 83 lb (37.6 kg)   · Usual Body Weight: 90 lb (40.8 kg) (standing scale 6/6/21)     · Ideal Body Weight: 115 lbs; % Ideal Body Weight 72.2 %   · BMI: 14.7  · BMI Categories: Underweight (BMI less than 18.5)       Nutrition Diagnosis:   · Severe malnutrition related to inadequate protein-energy intake as evidenced by poor intake prior to admission, weight loss greater than or equal to 5% in 1 month, severe loss of subcutaneous fat, severe muscle loss      Nutrition Interventions:   Food and/or Nutrient Delivery:  Continue Current Diet, Start Oral Nutrition Supplement  Nutrition Education/Counseling:  No recommendation at this time   Coordination of Nutrition Care:  Continue to monitor while inpatient    Goals:  Consume greater than 50% of meals and ONS this admission w/o further weight loss       Nutrition Monitoring and Evaluation:   Behavioral-Environmental Outcomes:  None Identified   Food/Nutrient Intake Outcomes:  Food and Nutrient Intake, Supplement Intake, Diet Advancement/Tolerance  Physical Signs/Symptoms Outcomes:  Biochemical Data, Chewing or Swallowing, Meal Time Behavior, Nutrition Focused Physical Findings, Weight     Discharge Planning:     Too soon to determine     Electronically signed by Concha Segura MS, RD, LD on 7/19/21 at 12:56 PM EDT    Contact: [phone]

## 2021-07-19 NOTE — CONSULTS
Palliative Care Initial Note  Palliative Care Admit date:  7/19/21  Reason for c/s:  [address]    Advance Directives:  Son, [facility], is uncertain if pt ever executed AD's. Have explained, in the absence of a HCPOA, pts children are viewed as her collective NOK. [facility] will look and if he finds a HCPOA, he agree's to provide a copy. Pt has a 'limited' code status to reflect DNR/DNI    Plan of care/goals:  Met w/ pt while she was up in chair, taking occasional bites of pudding. She affirmed her plan for going to [facility] [place] for skilled therapy upon d/c. While her ultimate goal is to return home, she was not able to indicate if long term placement was something she'd consider if she felt, at the end of therapy, that she wasn't safe to return home. When writer inquired as to her thoughts about hospice care, again, she defaulted to conversation of her 's hospice experience. Writer spoke w/ [facility] who is agreeable to SNF @ [facility] [place]. He is supportive, actually, of whatever plan of care is most beneficial for pt. He questions if pt will be able to return home independently, more so 2/2 to her emotional/mental state than her physical state. For now, he is satisfied to move forward w/ SNF though we also discussed the co-morbidities that make pt appropriate for hospice, should they elect to go that route down the road. Writer also discussed the likelihood for recurrent hospitalizations until/unless she has hospice and the resources she could have in the home which could support her enough emotionally that she'd fare well in the home. Social/Spiritual:  Pt is moved to tears easily when she begins to speak of her spouse who predeceased her ~4 yrs ago. She required redirection more than once when she reverted back to explaining the care her spouse needed rather than what she might ultimately need. Plan:  SLP rec'd MBS.   Will continue to follow to support family as they continue to consider [address]        Reason for consult:  _X_ Advance Care Planning  ___ Transition of Care Planning  _X_ Psychosocial/Spiritual Support  ___ Symptom Management                                                                                                                                          Devante Kelley RN

## 2021-07-19 NOTE — PROGRESS NOTES
Speech Language Pathology  Facility/Department: [facility] A2 CARD TELEMETRY   CLINICAL BEDSIDE SWALLOW EVALUATION    NAME: Génesis Jorgensen  : 1940  MRN: [id number]    Recommended Diet and Intervention  Diet Solids Recommendation: Dysphagia Soft and Bite-Sized (Dysphagia III)  Liquid Consistency Recommendation: Thin  Recommended Form of Meds: Whole with puree  MBSS recommended to correlate clinical findings and d/t pt's report of thickened liquids and ST in the past (none per chart)    ADMISSION DATE: 2021  ADMITTING DIAGNOSIS: has HTN (hypertension); Hyperlipidemia; Low back pain; Stress bladder incontinence, female; Seasonal allergies; CAD (coronary artery disease); Carotid artery disease; Wears glasses; Lung nodules; Aneurysm of thoracic aorta ([address] Menopausal osteoporosis; Essential hypertension; Coronary artery calcification; Bronchiectasis without complication ([address] A-fib ([address] Paroxysmal atrial fibrillation with rapid ventricular response ([address] Incarcerated right inguinal hernia; SBO (small bowel obstruction) (HCC); PAD (peripheral artery disease) ([address] Celiac artery stenosis ([address] Malignant hypertensive urgency; N&V (nausea and vomiting); and Failure to thrive in adult on their problem list.  ONSET DATE: 21    Recent Chest Xray/CT of Chest: N/A this admission    Date of Eval: 2021  Evaluating Therapist: Trudy Drake    Current Diet level:  Current Diet : Dysphagia Soft and Bite-Sized (Dysphagia III)  Current Liquid Diet : Thin      Primary Complaint  Patient Complaint: Per MD H&P The patient is a 78 y.o. female who presents with constipation with bowel impaction refractory to enemas and manual disimpaction. Lives at home by herself. Recently hospitalized and discharged () to home with hospice. She refuses to eat or drink by herself. Patient and son are now open to SNF but apparently changes her mind frequently.       Pain:  Pain Assessment  Pain Assessment: 0-10  Pain Level: 4  Patient's Stated Pain Goal: No pain  Pain Type: Acute pain  Pain Location: Rectum    Reason for Referral  Rochelle Ariza was referred for a bedside swallow evaluation to assess the efficiency of her swallow function, identify signs and symptoms of aspiration and make recommendations regarding safe dietary consistencies, effective compensatory strategies, and safe eating environment. Impression  Dysphagia Diagnosis: Mild oral stage dysphagia;Mild pharyngeal stage dysphagia  Dysphagia Outcome Severity Scale: Level 5: Mild dysphagia- Distant supervision. May need one diet consistency restricted   Pt was found upright in bed self-feeding breakfast. RN OK'd entry. Pt was alert, oriented,and cooperative throughout session. Pt reported she \"swallows fine\" and expressed other frustrations. Pt observed with puree, regular solid, and thin liquids. Occasional dry throat clear post swallow. Cued double swallow intermittently. Pt required extra time per swallow, appeared decreased anterior hyolaryngeal movement. Continue current diet with recommendations for modified barium swallow to correlate clinical findings and due to pt's self-report of using thickened liquids in the past. Meds whole in puree, pt reports difficulty with crushed meds in puree. Vitals/labs:   Temp: N/A  SpO2: 96%   RR: 32/min after PO   BP: 126/77  HR: 57      Treatment Plan  Requires SLP Intervention: Yes  Duration/Frequency of Treatment: 2-4x/week  D/C Recommendations: To be determined (Per OT/PT recs)       Recommended Diet and Intervention  Diet Solids Recommendation: Dysphagia Soft and Bite-Sized (Dysphagia III)  Liquid Consistency Recommendation: Thin  Recommended Form of Meds: Whole with puree  Recommendations: Dysphagia treatment; Modified barium swallow study  Therapeutic Interventions: Patient/Family education;Diet tolerance monitoring;Oral care    Compensatory Swallowing Strategies  Compensatory Swallowing Strategies: Eat/Feed slowly;Upright as possible for all oral intake;Remain upright for 30-45 minutes after meals;Small bites/sips    Treatment/Goals  Short-term Goals  Timeframe for Short-term Goals: 3 days, 07/22/21  Long-term Goals  Timeframe for Long-term Goals: 5 days, 07/24/21  Goal 1: Pt will tolerate least restrictive and safest diet without clinical s/s of aspiration. Dysphagia Goals: The patient will tolerate recommended diet without observed clinical signs of aspiration; The patient will tolerate instrumental swallowing procedure; The patient/caregiver will demonstrate understanding of compensatory strategies for improved swallowing safety. General  Chart Reviewed: Yes  Comments: Pt admitted with complaints of constipation and FTT. Behavior/Cognition: Alert; Cooperative;Pleasant mood  Temperature Spikes Noted: No  Respiratory Status: Room air  O2 Device: None (Room air)  Communication Observation: Functional  Follows Directions: Simple  Dentition: Adequate  Patient Positioning: Upright in bed  Baseline Vocal Quality: Normal  Volitional Cough: Weak  Prior Dysphagia History: No dysphagia history per chart review. Consistencies Administered: Dysphagia Pureed (Dysphagia I); Reg solid; Thin;Thin - straw           Vision/Hearing  Vision  Vision: Impaired  Vision Exceptions: Wears glasses at all times  Hearing  Hearing: Within functional limits    Oral Motor Deficits  Oral/Motor  Oral Motor: Within functional limits (Generalized weakness)    Oral Phase Dysfunction  Oral Phase  Oral Phase: Exceptions  Oral Phase Dysfunction  Decreased Anterior to Posterior Transit: Reg solid     Indicators of Pharyngeal Phase Dysfunction   Pharyngeal Phase  Pharyngeal Phase: Exceptions  Indicators of Pharyngeal Phase Dysfunction  Decreased Laryngeal Elevation: All (Decreased hyolaryngeal excursion)  Throat Clearing - Immediate: Thin - straw  Throat Clearing - Delayed:  Thin - straw    Prognosis  Prognosis  Prognosis for safe diet advancement: fair  Barriers to reach goals: fatigue;age  Individuals consulted  Consulted and agree with results and recommendations: Patient;RN    Education  Patient Education: Pt educated on reason for referral, role of ST, assessment results and recommendations.   Patient Education Response: Verbalizes understanding;Needs reinforcement  Safety Devices in place: Yes  Type of devices: Left in bed;Bed alarm in place;Call light within reach;Nurse notified       Therapy Time  SLP Individual Minutes  Time In: 2819  Time Out: 3878  Minutes: [facility]  Speech Pathology    7/19/2021 11:22 AM    Jenny Da Silva M.S. [id number] [facility]  Speech-language pathologist  [phone]

## 2021-07-19 NOTE — PROGRESS NOTES
Physical Therapy  Facility/Department: [facility] A2 CARD TELEMETRY  Daily Treatment Note  NAME: Belkis Bazzi  : 1940  MRN: [id number]    Date of Service: 2021    Discharge Recommendations:  Subacute/Skilled Nursing Facility   PT Equipment Recommendations  Equipment Needed: No  Other: defer to next level of care    Assessment    Body structures, Functions, Activity limitations: Decreased functional mobility ; Increased pain;Decreased balance;Decreased posture;Decreased strength;Decreased safe awareness;Decreased endurance  Assessment: Patient seen for gait and LE strengthening. Patient cleared by RN for therapy participation this date. Patient agreeable to therapy. Patient completed transfers with CGA and RW with cues for hand placement. Pt ambulates 150 ft with RW and CGA-SBA. Pt requires cues to remain within RW and to align self with chair. Pt completed toileting and required SBA for balance at sink while washing hands. P.T .will continue to follow throughout LOS. Recommending DC to SNF d/t weakness, decreased endurance, and decreased safety awareness. Treatment Diagnosis: Impaired balance and gait  Specific instructions for Next Treatment: Progress mobility as tolerated  Prognosis: Good  Decision Making: Medium Complexity  PT Education: Goals; General Safety;Gait Training;PT Role;Disease Specific Education;Plan of Care; Functional Mobility Training;Home Exercise Program;Precautions; Injury Prevention;Transfer Training  Patient Education: pt educated on techniques to improve gait and posture, importance of mobility and HEP - verbalizes understanding  Barriers to Learning: None  REQUIRES PT FOLLOW UP: Yes  Activity Tolerance  Activity Tolerance: Patient limited by fatigue;Patient limited by pain; Patient limited by endurance; Patient Tolerated treatment well  Activity Tolerance: 130/73, 66 bpm, 96% on RA     Patient Diagnosis(es): The primary encounter diagnosis was Constipation, unspecified constipation type. A diagnosis of Failure to thrive in adult was also pertinent to this visit. has a past medical history of CAD (coronary artery disease), Cancer ([address] Carotid artery disease ([address] HTN (hypertension), Hyperlipemia, Hyperlipidemia, LBP (low back pain), Lung nodules, Osteoporosis, Seasonal allergies, Stress bladder incontinence, female, Unspecified cerebral artery occlusion with cerebral infarction, and Wears glasses. has a past surgical history that includes Varicose vein surgery; Cardiac catheterization; Colonoscopy (8/13/2014); hernia repair (Right, 6/9/2021); Upper gastrointestinal endoscopy (N/A, 7/11/2021); and Upper gastrointestinal endoscopy (7/11/2021). Restrictions  Restrictions/Precautions  Restrictions/Precautions: Fall Risk  Required Braces or Orthoses?: No  Position Activity Restriction  Other position/activity restrictions: Up with assist  Subjective   General  Chart Reviewed: Yes  Response To Previous Treatment: Patient with no complaints from previous session. Family / Caregiver Present: No  Referring Practitioner: Lanny Timmons MD  Subjective  Subjective: pt in recliner, agreeable to therapy  Pain Screening  Patient Currently in Pain: Denies  Vital Signs  Patient Currently in Pain: Denies       Orientation  Orientation  Overall Orientation Status: Within Functional Limits     Objective   Bed mobility  Supine to Sit: Unable to assess  Sit to Supine: Contact guard assistance  Comment: pt in recliner at beginning and end of session  Transfers  Sit to Stand: Contact guard assistance (with RW)  Stand to sit: Contact guard assistance (with RW)  Ambulation  Ambulation?: Yes  Ambulation 1  Surface: level tile  Device: Rolling Walker  Assistance: Contact guard assistance;Stand by assistance (CGA-SBA)  Quality of Gait: Pt ambulates with slow aman, decreased clearance with short step length. Pt improves aman and step length with cues with limited carryover.  Pt demos significant thoracic trunk and neck flexion with downward gaze. Pt able to improve posture minimally for brief periods with cues. Gait Deviations: Slow Cheri;Decreased step length;Decreased step height  Distance: 150 ft  Stairs/Curb  Stairs?: No        Exercises  Hip Flexion: 1x 15 BLE seated marches  Knee Long Arc Quad: 1x 15 BLE  Ankle Pumps: 1x 15 BLE  Other exercises  Other exercises?: Yes  Other exercises 1: 1x 10 osmin scap sets                     AM-PAC Score  AM-PAC Inpatient Mobility Raw Score : 17 (07/19/21 1531)  AM-PAC Inpatient T-Scale Score : 42.13 (07/19/21 1531)  Mobility Inpatient CMS 0-100% Score: 50.57 (07/19/21 1531)  Mobility Inpatient CMS G-Code Modifier : CK (07/19/21 1531)          Goals  Short term goals  Time Frame for Short term goals: 1 week 7/25/21 (unless otherwise specified)  Short term goal 1: Pt will complete supine to/from sit with I  Short term goal 2: Pt will complete sit to/from stand with LRAD with S  Short term goal 3: Pt will ambulate >80' with LRAD with S without LOB  Short term goal 4: Pt will ascend/descend 2 steps with HR with SBA without LOB  Short term goal 5: 7/22/21: Pt will participate in 12-15 reps BLE exercises to increase strength and increase I with functional mobility; goal met 7/19 - pt completes 15 reps of LE exercises for strengthening and endurance  Patient Goals   Patient goals : \"Get stronger\"    Plan    Plan  Times per week: 3-5/wk  Times per day: Daily  Specific instructions for Next Treatment: Progress mobility as tolerated  Current Treatment Recommendations: Strengthening, Home Exercise Program, Safety Education & Training, Balance Training, Endurance Training, Patient/Caregiver Education & Training, Functional Mobility Training, Transfer Training, Gait Training, Stair training, Positioning  Safety Devices  Type of devices:  All fall risk precautions in place, Left in chair, Call light within reach, Chair alarm in place, Nurse notified, Gait belt, Patient at risk

## 2021-07-19 NOTE — PLAN OF CARE
Problem: Falls - Risk of:  Goal: Will remain free from falls  Description: Will remain free from falls  Outcome: Ongoing     Problem: Skin Integrity:  Goal: Will show no infection signs and symptoms  Description: Will show no infection signs and symptoms  Outcome: Ongoing     Problem: Skin Integrity:  Goal: Absence of new skin breakdown  Description: Absence of new skin breakdown  Outcome: Ongoing     Problem: Nutrition Deficit:  Goal: Ability to achieve adequate nutritional intake will improve  Description: Ability to achieve adequate nutritional intake will improve  Outcome: Ongoing

## 2021-07-19 NOTE — PLAN OF CARE
Problem: Nutrition  Goal: Optimal nutrition therapy  Outcome: Ongoing  Note: Nutrition Problem #1: Severe malnutrition  Intervention: Food and/or Nutrient Delivery: Continue Current Diet, Start Oral Nutrition Supplement  Nutritional Goals: Consume greater than 50% of meals and ONS this admission w/o further weight loss

## 2021-07-19 NOTE — PLAN OF CARE
Problem: Nutrition  Intervention: Swallowing evaluation  Note: Bedside swallow evaluation completed this date. Nitesh Amezcua M.S. [id number] [facility]  Speech-language pathologist  [id number]      Intervention: Aspiration precautions  Note: Bedside swallow evaluation completed this date.     Nitesh Amezcua M.S. [address] [facility]  Speech-language pathologist  [id number]

## 2021-07-19 NOTE — PROGRESS NOTES
Hospitalist Progress Note      PCP: Carly Oh APRN - CNP    Date of Admission: 7/17/2021    Chief Complaint: constipation    Subjective: no new c/o. Medications:  Reviewed    Infusion Medications    sodium chloride 75 mL/hr at 07/19/21 0402     Scheduled Medications    sodium phosphate  1 enema Rectal Once    ALPRAZolam  0.25 mg Oral BID    apixaban  2.5 mg Oral BID    cholestyramine  1 packet Oral BID    clopidogrel  75 mg Oral Daily    dilTIAZem  120 mg Oral Daily    metoprolol succinate  100 mg Oral Daily    atorvastatin  10 mg Oral Daily    spironolactone  25 mg Oral Daily    Vitamin D  1,000 Units Oral Daily     PRN Meds: sennosides-docusate sodium, ondansetron **OR** ondansetron, acetaminophen **OR** acetaminophen, sodium phosphate      Intake/Output Summary (Last 24 hours) at 7/19/2021 0932  Last data filed at 7/19/2021 0018  Gross per 24 hour   Intake 1451.25 ml   Output --   Net 1451.25 ml       Physical Exam Performed:    /77   Pulse 66   Temp 97.5 °F (36.4 °C) (Axillary)   Resp 16   Ht 5' 3\" (1.6 m)   Wt 83 lb 5.3 oz (37.8 kg)   SpO2 96%   BMI 14.76 kg/m²     General appearance: No apparent distress, appears stated age and cooperative. HEENT: Pupils equal, round, and reactive to light. Conjunctivae/corneas clear. Neck: Supple, with full range of motion. No jugular venous distention. Trachea midline. Respiratory:  Normal respiratory effort. Clear to auscultation, bilaterally without Rales/Wheezes/Rhonchi. Cardiovascular: Regular rate and rhythm with normal S1/S2 without murmurs, rubs or gallops. Abdomen: Soft, non-tender, non-distended with normal bowel sounds. Musculoskeletal: No clubbing, cyanosis or edema bilaterally. Full range of motion without deformity. Skin: Skin color, texture, turgor normal.  No rashes or lesions. Neurologic:  Neurovascularly intact without any focal sensory/motor deficits.  Cranial nerves: II-XII intact, grossly non-focal.  Psychiatric: Alert and oriented, thought content appropriate, normal insight  Capillary Refill: Brisk,< 3 seconds   Peripheral Pulses: +2 palpable, equal bilaterally       Labs:   Recent Labs     07/17/21  0956 07/18/21  0643   WBC 6.3 6.1   HGB 12.7 13.0   HCT 37.7 38.1    278     Recent Labs     07/17/21  0956 07/18/21  0643 07/19/21  0731    137 139   K 4.7 4.3 4.0    102 105   CO2 26 24 26   BUN 23* 18 14   CREATININE 0.9 0.7 0.8   CALCIUM 10.3 9.9 9.4     Recent Labs     07/17/21  0956   AST 19   ALT 10   BILITOT 0.5   ALKPHOS 86     No results for input(s): INR in the last 72 hours. No results for input(s): Juanis Pace in the last 72 hours. Urinalysis:      Lab Results   Component Value Date    NITRU Negative 07/17/2021    WBCUA 0-2 06/06/2021    BACTERIA 4+ 07/07/2020    RBCUA 0-2 06/06/2021    BLOODU Negative 07/17/2021    SPECGRAV 1.020 07/17/2021    GLUCOSEU Negative 07/17/2021       Consults:    IP CONSULT TO HOSPITALIST  IP CONSULT TO CASE MANAGEMENT  IP CONSULT TO DIETITIAN  IP CONSULT TO PALLIATIVE CARE      Assessment/Plan:    Active Hospital Problems    Diagnosis     Failure to thrive in adult [R62.7]     A-fib ([address] [I48.91]     HTN (hypertension) [I10]     Hyperlipidemia [E78.5]     CAD (coronary artery disease) [I25.10]        Failure to thrive - with severe protein calorie malnutrition:  Dietician evaluation for nutritional supplements and support. Nausea and vomiting - of unclear etiology. Present prior to recent hernia surgical repair so doubt significant post-procedural complication. CT scan reassuring w/out evidence of obstruction but w/ possible seroma/hematom favoured - doubt abscess clinically.  Admission U/A negative.  EGD (7/13) showed mild gastritis, presbyesophagus s/p savory dilation 57F, otherwise normal EGD.  Gastric reflux and stress ulcer prophylaxis with pantoprazole (Protonix) 40 mg daily.     Chronic systolic heart failure (EF

## 2021-07-20 NOTE — CARE COORDINATION
Writer spoke to Selam Fuentes with [facility] regarding plan of care. Pt family exploring ECF with hospice vs home with hospice. Looking for costs. Placed call to [facility] for [facility] cost.  Information for [facility] given to son. Also placed call to [facility] for cost and awaiting returned call. Family to explore options tonight and get back to writer tomorrow.  Bubba Can RN

## 2021-07-20 NOTE — PLAN OF CARE
Palliative Care Progress Note  Palliative Care Admit date:  7/19/21    Advance Directives:  [facility] has emailed AD's to writer, he is designated as her healthcare proxy    Plan of care/goals:  Apprised by shift RN as to pts MBS; have also reviewed SLP note. Spoke w/ son, jessica, to apprise of testing outcome and pts declination for a PEG. Writer will f/u w/ Salena Nadia to review her options though I have spoken w/ [facility] about hospice and why pt wouldn't continue to qualify for SNF w/o a PEG. [facility] is very supportive of whatever decision pt would choose to make. We discussed hospice options and son requested ref be called to [facility]. Plan: Will f/u w/ Jva Nadia to review MBS findings and rec's and options for care. It is unlikely she can return home as she has no 24/7 care giver. She would either need long term placement, unless she can qualify for the hospice IPU in light of the significance of her aspiration. ADDENDUM @ 1240:  Met w/ Mrs. Junaid Moon who understood \"my swallowing test didn't come out well. \"  Writer affirmed and clarified what that exactly meant. Writer also informed her that, in order to move forward w/ SNF @ d/c, she would need to have a PEG placed. Pt verb'd opposition to having a feeding tube, even short term. Her spouse had a PEG and she didn't discern benefit in his situation. Writer then broached that hospice could become involved in her care but it is unlikely she will be able to return home w/o 24/7 care. Salena Nadia is sad at the prospect of not returning home, she is feeling overwhelmed and expressing her need for [facility] to lead the direction at this point. Ref for hospice called to [facility] who will contact son to arrange family mtg.         Reason for consult:  _X_ Advance Care Planning  ___ Transition of Care Planning  _X_ Psychosocial/Spiritual Support  ___ Symptom

## 2021-07-20 NOTE — PROCEDURES
INSTRUMENTAL SWALLOW REPORT  MODIFIED BARIUM SWALLOW    Recommended Diet:  Solid consistency: NPO  Liquid consistency: NPO  Medication administration: Via NG/PEG  · Pt is not safe for any PO intake, recommend strict NPO with long-term alternative means of nutrition (if consistent with pt's POC / wishes) or comfort care with pleasure feeds. NAME: Eric Silva   : 1940  MRN: [id number]       Date of Eval: 2021     Ordering Physician: Dr. Britton Jason  Radiologist: Dr. Gina Vivas     Referring Diagnosis(es): Referring Diagnosis: Dysphagia    Past Medical History:  has a past medical history of CAD (coronary artery disease), Cancer ([facility] [address] Carotid artery disease ([facility] [address] HTN (hypertension), Hyperlipemia, Hyperlipidemia, LBP (low back pain), Lung nodules, Osteoporosis, Seasonal allergies, Stress bladder incontinence, female, Unspecified cerebral artery occlusion with cerebral infarction, and Wears glasses. Past Surgical History:  has a past surgical history that includes Varicose vein surgery; Cardiac catheterization; Colonoscopy (2014); hernia repair (Right, 2021); Upper gastrointestinal endoscopy (N/A, 2021); and Upper gastrointestinal endoscopy (2021). Current Diet Solid Consistency: Dysphagia Soft and Bite-Sized (Dysphagia III)  Current Diet Liquid Consistency: Thin    Date of Prior Study: n/a  Type of Study: Initial MBS  Results of Prior Study: n/a    Recent CXR: n/a, not completed this admission    Patient Complaints/Reason for Referral:  Eric Silva was referred for a MBS to assess the efficiency of his/her swallow function, assess for aspiration, and to make recommendations regarding safe dietary consistencies, effective compensatory strategies, and safe eating environment. Patient complaints: Per MD H&P, \"The patient is a 78 y.o. female who presents with constipation with bowel impaction refractory to enemas and manual disimpaction. Lives at home by herself. Recently hospitalized and discharged (7/13) to home with hospice. She refuses to eat or drink by herself. Patient and son are now open to SNF but apparently changes her mind frequently\". General Comment: Pt admitted with complaints of constipation and FTT. Behavior/Cognition/Vision/Hearing:  Behavior/Cognition: Alert; Cooperative; Requires cueing (Tearful)  Vision: Impaired  Vision Exceptions: Wears glasses at all times  Hearing: Within functional limits    Impressions:  Treatment Dx: Dysphagia  · Pt's oral phase deficits characterized by weak lingual manipulation and reduced bolus control and cohesion resulting in premature bolus loss to the pharynx. · Pt's pharyngeal phase deficits minimal-absent epiglottic retroversion, reduced pharyngeal peristalsis, and reduced airway/laryngeal closure with deep penetration to the level of the vocal folds during and after the swallow with thin (tsp x1, cup x1) and nectar-thick liquids (tsp x1). Penetration did not successfully clear despite cued cough and cued repeat swallows. Eventual aspiration observed with thin liquid via cup -- pt with immediate, weak, and ineffective cough that did not successfully clear aspirated material.  Significant diffuse pharyngeal residue noted post-swallow with all PO. Minimal residue successfully clearing after multiple cued double swallows, attempts at head turn right and head turn right with chin tuck. Ongoing penetration noted after the swallow d/t significant pharyngeal residue with inability to clear. PO trials discontinued at this time d/t aspiration risk. Oral Preparation / Oral Phase  Impaired  Oral Phase - Major Contributing Deficits  Weak Lingual Manipulation: All  Reduced Bolus Control: All  Decreased Bolus Cohesion: All  Lingual / Palatal Residue: All (Minimal; consistent)  Premature Bolus Loss to Pharynx:  All    Pharyngeal Phase  Impaired  Pharyngeal Phase - Major Contributing Deficits  Premature Spillage to Valleculae: All  Reduced Pharyngeal Peristalsis: All  Reduced Epiglottic Distention: All (Reduced epiglottic retroversion; minimal-no)  Reduced Laryngeal Elevation: All  Reduced Airway/laryngeal Closure: All  Reduced Tongue Base: All  Deep Penetration During: Nectar teaspoon; Thin teaspoon; Thin cup  Deep Penetration After: Nectar teaspoon; Thin teaspoon; Thin cup  No Retrieval (deep): Nectar teaspoon; Thin teaspoon; Thin cup  Aspiration After: Thin cup  Weak Cough Reflex: Thin cup  No Bolus Expelled: Thin cup  Pharyngeal Residue - Valleculae: All  Pharyngeal Residue - Pyriform: All  Pharyngeal Residue - Posterior Pharynx: All  Pharyngeal Wall - Weakness: All    Esophageal Phase  Impaired  Upper Esophageal Screen- Major Contributing Deficits  Reduced Cricopharyngeal Opening: All       Patient Position: Lateral and Patient Degrees: 90, [facility]    Consistencies Administered: Thin teaspoon; Thin cup;Nectar  teaspoon    Postural Changes and/or Swallow Maneuvers Trialed: Chin tuck; Head turn right    Dysphagia Outcome Severity Scale: Level 1: Severe dysphagia- NPO. Unable to tolerate any PO safely  Penetration-Aspiration Scale (PAS): 7 - Material enters the airway, passes below the vocal folds, and is not ejected from the trachea despite effort    Recommended Diet:  Solid consistency: NPO  Liquid consistency: NPO  Medication administration: Via NG/PEG    Safe Swallow Protocol: NPO; oral care 2-3x/day    Recommendations/Treatment  Requires SLP Intervention: Yes  D/C Recommendations: To be determined (per POC / pt's wishes)    Recommended Exercises:    Therapeutic Interventions: Patient/Family education;Oral care; Laryngeal exercises; Pharyngeal exercises; Vital Stim/NMES;Tongue base strengthening;Effortful swallow    Education: Images and recommendations were reviewed with pt and RN following this exam.   Patient Education: Pt educated on MBSS procedure, nature of oropharyngeal deficits, assessment results and recommendations. Patient Education Response: Verbalizes understanding;Needs reinforcement    Prognosis  Prognosis for safe diet advancement: guarded  Barriers to reach goals: fatigue;age;severity of dysphagia;time post onset  Duration/Frequency of Treatment  Duration/Frequency of Treatment: TBD pending pt's POC / wishes  Safety Devices  Safety Devices in place: Yes  Type of devices: All fall risk precautions in place      Goals:    Long Term:   Timeframe for Long-term Goals: 5 days, 07/24/21  Goal 1: Pt will participate in laryngeal / pharyngeal strengthening and TBR exercises in 10/10 trials, min-mod cues. Short Term:    Dysphagia Goals: The patient will tolerate instrumental swallowing procedure; The patient/caregiver will demonstrate understanding of compensatory strategies for improved swallowing safety. Goal 3: The pt will participate in oral care 2-3x/day, min cues.     Pain   Patient Currently in Pain: No      Therapy Time:   Individual Concurrent Group Co-treatment   Time In 1005         Time Out 1035         Minutes 30           MBSS procedure and dysphagia tx    Nancy Brothers M.S. [id number] [facility]  Speech-language pathologist  [id number]

## 2021-07-20 NOTE — PROGRESS NOTES
non-focal.  Psychiatric: Alert and oriented, thought content appropriate, normal insight  Capillary Refill: Brisk,< 3 seconds   Peripheral Pulses: +2 palpable, equal bilaterally       Labs:   Recent Labs     07/18/21  0643 07/20/21  0718   WBC 6.1 5.3   HGB 13.0 11.5*   HCT 38.1 34.0*    237     Recent Labs     07/18/21  0643 07/19/21  0731 07/20/21  0718    139 139   K 4.3 4.0 3.8    105 108   CO2 24 26 24   BUN 18 14 13   CREATININE 0.7 0.8 0.7   CALCIUM 9.9 9.4 8.7   PHOS  --   --  1.8*     No results for input(s): AST, ALT, BILIDIR, BILITOT, ALKPHOS in the last 72 hours. No results for input(s): INR in the last 72 hours. No results for input(s): Connee Matar in the last 72 hours. Urinalysis:      Lab Results   Component Value Date    NITRU Negative 07/17/2021    WBCUA 0-2 06/06/2021    BACTERIA 4+ 07/07/2020    RBCUA 0-2 06/06/2021    BLOODU Negative 07/17/2021    SPECGRAV 1.020 07/17/2021    GLUCOSEU Negative 07/17/2021       Consults:    IP CONSULT TO HOSPITALIST  IP CONSULT TO CASE MANAGEMENT  IP CONSULT TO DIETITIAN  IP CONSULT TO PALLIATIVE CARE      Assessment/Plan:    Active Hospital Problems    Diagnosis     [facility] [E[facility]]     Failure to thrive in adult [R62.7]     A-fib ([facility] [I48.91]     HTN (hypertension) [I10]     Hyperlipidemia [E78.5]     CAD (coronary artery disease) [I25.10]        Failure to thrive - with severe protein calorie malnutrition:  Dietician evaluation for nutritional supplements and support. Nausea and vomiting - of unclear etiology. Present prior to recent hernia surgical repair so doubt significant post-procedural complication. CT scan reassuring w/out evidence of obstruction but w/ possible seroma/hematom favoured - doubt abscess clinically.  EGD 13 July showed mild gastritis, presbyesophagus s/p savory dilation 57F, otherwise normal EGD - followed outpt by Dr. Cristian Mon [facility] ordered and pending.     HTN/CAD - w/ known CAD but no evidence of active signs/sxs of ischemia/failure. Currently controlled on home meds w/ vitals reviewed and documented as above.     HyperLipidemia - controlled on home Statin. Continue, w/ f/u and med adjustment w/ PCP     Afib - chronic paroxysmal of unspecified and clinically unable to determine etiology.  Normally rate controlled on BBlocker/CCBlocker - continued.  Anticoagulated at baseline on home Eliquis - continued.  Monitored on tele.       CHF - chronic systolic failure w/ reduced EF 20-25% by Echo dated June 2021.  Likely due to ischemic heart disease.  No evidence of acute decompensation.  Continue current medical mgt. Tolerated initial IVF.      Anemia - etiology clinically unable to determine, w/out evidence of active bleeding/hemolysis. Stable and asymptomatic w/out indication for transfusion. Follow serial labs.  Reviewed and documented as above. Anxiety/Depression -  Continues on alprazolam 0.25 mg BID but NOT paroxetine (Paxil) despite being on the medication list per son. Quan Zafar by Psychiatry (Dr. Franca Chun) last hospitalization. Vitamin D deficiency -  Continues on cholecalciferol (vit D3) 1,000 units (25 mcg) daily. Stage II sacrum wound -  Followed by wound care with topical therapy.       DVT Prophylaxis: Eliquis     Recent Labs     07/18/21  0643 07/20/21  0718    237     Diet: ADULT DIET; Dysphagia - Soft and Bite Sized  Adult Oral Nutrition Supplement; Frozen Oral Supplement  Adult Oral Nutrition Supplement; Standard High Calorie/High Protein Oral Supplement  Code Status: Limited - (NO to all four questions)      PT/OT Eval Status: seen w/ recs for SNF. Dispo - Likely to SNF Tues/Wed 20/21 July pending clinical course.   Patient and family have been resistant to this option but amenable at this time    Axel Bonilla MD

## 2021-07-20 NOTE — PROGRESS NOTES
Occupational Therapy      Therapy attempted to see pt for OT follow up session. Per RN, pt is planning on going hospice. Therapy will sign off at this time. Please re-order if there is a change in status.     Thank you    Joshua Toledo OTR/L

## 2021-07-20 NOTE — PROGRESS NOTES
Speech Language Pathology  MBSS    MBSS completed -- aspiration with thin and significant residual with all PO that does not successfully clear despite multiple attempts / strategies. Pt is not safe for any PO intake, recommend strict NPO with long-term alternative means of nutrition (if consistent with pt's POC / wishes) or comfort care with pleasure feeds. RN aware and [employer] message sent to MD.  Full report to follow.     Yeimy Becerra M.S. [id number] [facility]  Speech-language pathologist  [phone]

## 2021-07-20 NOTE — PROGRESS NOTES
Physical Therapy    Attempted to see pt for follow up tx. Pt reports she wishes to go hospice. When asked if she would like to continue PT/OT services while in acute care, pt states she does not wish to have any further therapy. PT to sign off.           Marybeth Mckeon, PT, DPT

## 2021-07-20 NOTE — CARE COORDINATION
Writer placed call to [facility] with [facility] earlier today to follow up on referral.  She did not receive referral, CM resent it. During 11am huddle today nursing stated pt is wanting to discuss goals of care/plan of care. Nursing to reach out to MD and palliative care RN for further converstations. [facility] reviewing information.  Geeta German RN

## 2021-07-21 NOTE — CARE COORDINATION
Writer received call from [facility] with [employer] living. They are evaluating patient to join their program/facility. Information faxed and Chang to come evaluate pt today. Spoke to Conjecta who confirmed the above and is in agreement.   Fern Carranza, RN

## 2021-07-21 NOTE — PROGRESS NOTES
Hospitalist Progress Note      PCP: NED Greenberg - CNP    Date of Admission: 7/17/2021    Chief Complaint: constipation    Subjective: no new c/o. Medications:  Reviewed    Infusion Medications    sodium chloride 75 mL/hr at 07/20/21 2011     Scheduled Medications    sodium phosphate  1 enema Rectal Once    apixaban  2.5 mg Oral BID    cholestyramine  1 packet Oral BID    clopidogrel  75 mg Oral Daily    dilTIAZem  120 mg Oral Daily    metoprolol succinate  100 mg Oral Daily    atorvastatin  10 mg Oral Daily    spironolactone  25 mg Oral Daily    Vitamin D  1,000 Units Oral Daily     PRN Meds: LORazepam, sennosides-docusate sodium, ondansetron **OR** ondansetron, acetaminophen **OR** acetaminophen, sodium phosphate      Intake/Output Summary (Last 24 hours) at 7/21/2021 0834  Last data filed at 7/20/2021 2017  Gross per 24 hour   Intake 357 ml   Output 250 ml   Net 107 ml       Physical Exam Performed:    BP (!) 164/98   Pulse 97   Temp 98.7 °F (37.1 °C) (Oral)   Resp 18   Ht 5' 3\" (1.6 m)   Wt 83 lb 5.3 oz (37.8 kg)   SpO2 95%   BMI 14.76 kg/m²     General appearance: No apparent distress, appears stated age and cooperative. HEENT: Pupils equal, round, and reactive to light. Conjunctivae/corneas clear. Neck: Supple, with full range of motion. No jugular venous distention. Trachea midline. Respiratory:  Normal respiratory effort. Clear to auscultation, bilaterally without Rales/Wheezes/Rhonchi. Cardiovascular: Regular rate and rhythm with normal S1/S2 without murmurs, rubs or gallops. Abdomen: Soft, non-tender, non-distended with normal bowel sounds. Musculoskeletal: No clubbing, cyanosis or edema bilaterally. Full range of motion without deformity. Skin: Skin color, texture, turgor normal.  No rashes or lesions. Neurologic:  Neurovascularly intact without any focal sensory/motor deficits.  Cranial nerves: II-XII intact, grossly non-focal.  Psychiatric: Alert and oriented, thought content appropriate, normal insight  Capillary Refill: Brisk,< 3 seconds   Peripheral Pulses: +2 palpable, equal bilaterally       Labs:   Recent Labs     07/20/21  0718   WBC 5.3   HGB 11.5*   HCT 34.0*        Recent Labs     07/19/21  0731 07/20/21  0718    139   K 4.0 3.8    108   CO2 26 24   BUN 14 13   CREATININE 0.8 0.7   CALCIUM 9.4 8.7   PHOS  --  1.8*     No results for input(s): AST, ALT, BILIDIR, BILITOT, ALKPHOS in the last 72 hours. No results for input(s): INR in the last 72 hours. No results for input(s): Edie Dustin in the last 72 hours. Urinalysis:      Lab Results   Component Value Date    NITRU Negative 07/17/2021    WBCUA 0-2 06/06/2021    BACTERIA 4+ 07/07/2020    RBCUA 0-2 06/06/2021    BLOODU Negative 07/17/2021    SPECGRAV 1.020 07/17/2021    GLUCOSEU Negative 07/17/2021       Consults:    IP CONSULT TO HOSPITALIST  IP CONSULT TO CASE MANAGEMENT  IP CONSULT TO DIETITIAN  IP CONSULT TO PALLIATIVE CARE  IP CONSULT TO HOSPICE      Assessment/Plan:    Active Hospital Problems    Diagnosis     Severe malnutrition ([facility] [address] [E43]     Failure to thrive in adult [R62.7]     [facility] [I48.91]     HTN (hypertension) [I10]     Hyperlipidemia [E78.5]     CAD (coronary artery disease) [I25.10]        Failure to thrive - with severe protein calorie malnutrition. Dietician evaluation for nutritional supplements and support. Nausea and vomiting - of unclear etiology. Present prior to recent hernia surgical repair so doubt significant post-procedural complication. CT scan reassuring w/out evidence of obstruction but w/ possible seroma/hematom favoured - doubt abscess clinically.  EGD 13 July showed mild gastritis, presbyesophagus s/p savory dilation 57F, otherwise normal EGD - followed outpt by Dr. Lisa Romero MBS ordered w/ evidence of aspiration and oropharyngeal dysphagia. Patient declines PEG. Hospice consulted and appreciated.      HTN/CAD - w/ known CAD but no evidence of active signs/sxs of ischemia/failure. Currently controlled on home meds w/ vitals reviewed and documented as above.     HyperLipidemia - controlled on home Statin. Continue, w/ f/u and med adjustment w/ PCP     Afib - chronic paroxysmal of unspecified and clinically unable to determine etiology.  Normally rate controlled on BBlocker/CCBlocker - continued.  Anticoagulated at baseline on home Eliquis - continued.  Monitored on tele.       CHF - chronic systolic failure w/ reduced EF 20-25% by Echo dated June 2021.  Likely due to ischemic heart disease.  No evidence of acute decompensation.  Continue current medical mgt. Tolerated initial IVF.      Anemia - etiology clinically unable to determine, w/out evidence of active bleeding/hemolysis. Stable and asymptomatic w/out indication for transfusion. Follow serial labs.  Reviewed and documented as above. Anxiety/Depression -  Continues on alprazolam 0.25 mg BID but NOT paroxetine (Paxil) despite being on the medication list per son. Michael Soe by Psychiatry (Dr. Leny Fishman) last hospitalization. Vitamin D deficiency -  Continues on cholecalciferol (vit D3) 1,000 units (25 mcg) daily. Stage II sacrum wound -  Followed by wound care with topical therapy.       DVT Prophylaxis: Eliquis     Recent Labs     07/20/21  0718        Diet: Diet NPO  Code Status: Limited - (NO to all four questions)      PT/OT Eval Status: seen w/ recs for SNF. Dispo - Likely to SNF/Hospice Wed 21 July pending clinical course.         Kris Corona MD

## 2021-07-21 NOTE — PLAN OF CARE
Problem: Falls - Risk of:  Goal: Will remain free from falls  Description: Will remain free from falls  Outcome: Ongoing  Note: Pt will remain free from falls throughout hospital stay. Fall precautions in place, bed alarm on, bed in lowest position with wheels locked and side rails 2/4 up. Room door open and hourly rounding completed. Will continue to monitor throughout shift. Problem: Skin Integrity:  Goal: Will show no infection signs and symptoms  Description: Will show no infection signs and symptoms  Outcome: Ongoing  Note: Pt is at risk for skin breakdown. Pt will have skin assessments every shift, Q2 turns, heels elevated off of the bed, and friction and shear prevented when possible. Will continue to monitor for signs of skin breakdown and enforce prevention measures.        Problem: Nutrition Deficit:  Goal: Ability to achieve adequate nutritional intake will improve  Description: Ability to achieve adequate nutritional intake will improve  Outcome: Ongoing

## 2021-07-21 NOTE — CARE COORDINATION
Writer placed call again to [facility] to follow up on evaluation from today to see if they are able to accept pt. LM and awaiting returned call.  Jj Sanders RN

## 2021-07-22 NOTE — PLAN OF CARE
Problem: Falls - Risk of:  Goal: Will remain free from falls  Description: Will remain free from falls  Note: Pt will remain free from falls throughout hospital stay. Fall precautions in place, bed alarm on, bed in lowest position with wheels locked and side rails 2/4 up. Room door open and hourly rounding completed. Will continue to monitor throughout shift. Problem: Skin Integrity:  Goal: Will show no infection signs and symptoms  Description: Will show no infection signs and symptoms  Outcome: Ongoing  Note: Pt is at risk for skin breakdown. Pt will have skin assessments every shift, Q2 turns, heels elevated off of the bed, and friction and shear prevented when possible. Will continue to monitor for signs of skin breakdown and enforce prevention measures.        Problem: Nutrition Deficit:  Goal: Ability to achieve adequate nutritional intake will improve  Description: Ability to achieve adequate nutritional intake will improve  Outcome: Ongoing

## 2021-07-22 NOTE — TELEPHONE ENCOUNTER
Form for Admittance to [facility] received by fax. They would like for signed and sent back ASAP . Patient is to be discharged from [facility] on Friday.       Last OV ;2-3-2021

## 2021-07-22 NOTE — FLOWSHEET NOTE
Pt was happy to have  visit. She shared many stories about her life, family, growing up and jose guadalupe. Pt talked about her end of life. She said she feels at peace with herself and with God. Pt said, \"I am not afraid to die. I feel I am ready. \" Pt welcomes prayer. Prayer was said at bedside.

## 2021-07-22 NOTE — CARE COORDINATION
Writer spoke to [facility] with [facility]. They have accepted patient and family has chosen to sign on with their services for hospice as well. [facility] has reached out to pt PCP NED Luong CNP for additional information and should be able to accept pt tomorrow 7/23/21. Will need an updated Covid test for admission. CM ordered a rapid test.  Will arrange transport once confirmed has received information needed from PCP to admit.   Ana Hughes RN

## 2021-07-22 NOTE — TELEPHONE ENCOUNTER
Son Elton Silva is calling about the forms getting signed. They plan to move mother into [place] in [place] tomorrow.    Call jing Silva with any questions  [phone]

## 2021-07-22 NOTE — PROGRESS NOTES
[facility]:  Reviewed chart and left VM for BALJINDER Felton to please notify [address] if family has found facility for patient today and ready to enroll.       Thank you,  Izabella Johnson

## 2021-07-22 NOTE — PROGRESS NOTES
Hospitalist Progress Note      PCP: NED Ferreira - CNP    Date of Admission: 7/17/2021    Chief Complaint: constipation    Subjective: no new c/o. Medications:  Reviewed    Infusion Medications    sodium chloride 75 mL/hr at 07/22/21 0566     Scheduled Medications    sodium phosphate  1 enema Rectal Once    apixaban  2.5 mg Oral BID    cholestyramine  1 packet Oral BID    clopidogrel  75 mg Oral Daily    dilTIAZem  120 mg Oral Daily    metoprolol succinate  100 mg Oral Daily    atorvastatin  10 mg Oral Daily    spironolactone  25 mg Oral Daily    Vitamin D  1,000 Units Oral Daily     PRN Meds: morphine, LORazepam, sennosides-docusate sodium, ondansetron **OR** ondansetron, acetaminophen **OR** acetaminophen, sodium phosphate      Intake/Output Summary (Last 24 hours) at 7/22/2021 0946  Last data filed at 7/22/2021 0811  Gross per 24 hour   Intake 3565.64 ml   Output 900 ml   Net 2665.64 ml       Physical Exam Performed:    BP (!) 155/81   Pulse 80   Temp 97.9 °F (36.6 °C) (Oral)   Resp 16   Ht 5' 3\" (1.6 m)   Wt 83 lb 5.3 oz (37.8 kg)   SpO2 95%   BMI 14.76 kg/m²     General appearance: No apparent distress, appears stated age and cooperative. HEENT: Pupils equal, round, and reactive to light. Conjunctivae/corneas clear. Neck: Supple, with full range of motion. No jugular venous distention. Trachea midline. Respiratory:  Normal respiratory effort. Clear to auscultation, bilaterally without Rales/Wheezes/Rhonchi. Cardiovascular: Regular rate and rhythm with normal S1/S2 without murmurs, rubs or gallops. Abdomen: Soft, non-tender, non-distended with normal bowel sounds. Musculoskeletal: No clubbing, cyanosis or edema bilaterally. Full range of motion without deformity. Skin: Skin color, texture, turgor normal.  No rashes or lesions. Neurologic:  Neurovascularly intact without any focal sensory/motor deficits.  Cranial nerves: II-XII intact, grossly non-focal.  Psychiatric: Alert and oriented, thought content appropriate, normal insight  Capillary Refill: Brisk,< 3 seconds   Peripheral Pulses: +2 palpable, equal bilaterally       Labs:   Recent Labs     07/20/21  0718 07/22/21  0526   WBC 5.3 6.1   HGB 11.5* 12.0   HCT 34.0* 35.4*    227     Recent Labs     07/20/21  0718 07/22/21  0526    138   K 3.8 3.5    102   CO2 24 24   BUN 13 10   CREATININE 0.7 0.6   CALCIUM 8.7 9.3   PHOS 1.8* 2.2*     No results for input(s): AST, ALT, BILIDIR, BILITOT, ALKPHOS in the last 72 hours. No results for input(s): INR in the last 72 hours. No results for input(s): Shellia Bugler in the last 72 hours. Urinalysis:      Lab Results   Component Value Date    NITRU Negative 07/17/2021    WBCUA 0-2 06/06/2021    BACTERIA 4+ 07/07/2020    RBCUA 0-2 06/06/2021    BLOODU Negative 07/17/2021    SPECGRAV 1.020 07/17/2021    GLUCOSEU Negative 07/17/2021       Consults:    IP CONSULT TO HOSPITALIST  IP CONSULT TO CASE MANAGEMENT  IP CONSULT TO DIETITIAN  IP CONSULT TO PALLIATIVE CARE  IP CONSULT TO HOSPICE      Assessment/Plan:    Active Hospital Problems    Diagnosis     Severe malnutrition ([facility] [address] [E43]     Failure to thrive in adult [R62.7]     A-fib ([facility] [I48.91]     HTN (hypertension) [I10]     Hyperlipidemia [E78.5]     CAD (coronary artery disease) [I25.10]        Failure to thrive - with severe protein calorie malnutrition. Dietician evaluation for nutritional supplements and support. Nausea and vomiting - of unclear etiology. Present prior to recent hernia surgical repair so doubt significant post-procedural complication. CT scan reassuring w/out evidence of obstruction but w/ possible seroma/hematom favoured - doubt abscess clinically.  EGD 13 July showed mild gastritis, presbyesophagus s/p savory dilation 57F, otherwise normal EGD - followed outpt by Dr. Jocelyn Rose MBS ordered w/ evidence of aspiration and oropharyngeal dysphagia. Patient declines PEG.  Hospice

## 2021-07-23 NOTE — PROGRESS NOTES
Nutrition Note    Due to hospice status, patient will be followed at low nutrition risk. Dietitian will sign off. If nutrition intervention is required, please submit a dietary consult.       Electronically signed by Zane Gore MS, RD, LD on 7/23/21 at 9:37 AM EDT    Contact: [phone]

## 2021-07-23 NOTE — PROGRESS NOTES
Speech Language Pathology    SLP reviewed pt's chart, spoke with RN. Pt pursuing hospice care, ST to sign-off at this time.        Alden Stockton M.S. [id number] [facility]  Speech-language pathologist  [phone]

## 2021-07-23 NOTE — CARE COORDINATION
CASE MANAGEMENT DISCHARGE SUMMARY      Discharge to: [facility] [address] [facility] [address] with hospice    IMM given: (date) 7/22/21    New Durable Medical Equipment ordered/agency: none    Transportation: ambulance   Medical Transport explained to pt/family. Pt/family voice no agency preference. Agency used: [address] [place] [address] up time: 230pm   Ambulance form completed: Yes    Confirmed discharge plan with:   Patient: yes   Family:  Yes, son at bedside and aware of the above plan   Facility/Agency, name:  [facility] faxed to [place] number for report to facility: [phone] Assisted living RN Sandra Gustafson   RN, name: Josiah Weiss RN aware    Note: Discharging nurse to complete [facility], reconcile [facility], and place final copy with patient's discharge packet. RN to ensure that written prescriptions for  Level II medications are sent with patient to the facility as per protocol.     Abby Dang RN

## 2021-07-23 NOTE — PLAN OF CARE
Problem: Falls - Risk of:  Goal: Will remain free from falls  Description: Will remain free from falls  7/23/2021 0358 by Citlalli Noland RN  Outcome: Met This Shift  7/22/2021 1826 by Royer Bustamante RN  Outcome: Ongoing  Goal: Absence of physical injury  Description: Absence of physical injury  Outcome: Met This Shift     Problem: Skin Integrity:  Goal: Will show no infection signs and symptoms  Description: Will show no infection signs and symptoms  7/23/2021 0358 by Citlalli Noland RN  Outcome: Ongoing  7/22/2021 1826 by Royer Bustamante RN  Outcome: Ongoing  Goal: Absence of new skin breakdown  Description: Absence of new skin breakdown  Outcome: Ongoing     Problem: Nutrition Deficit:  Goal: Ability to achieve adequate nutritional intake will improve  Description: Ability to achieve adequate nutritional intake will improve  Outcome: Not Met This Shift

## 2021-07-23 NOTE — DISCHARGE SUMMARY
Hospital Medicine Discharge Summary    Patient ID: Norma Coker      Patient's PCP: Surendra Carias, APRN - CNP    Admit Date: 7/17/2021     Discharge Date: 7/23/2021      Admitting Physician: Beau Grijalva MD     Discharge Physician: Isadore Rinne, MD     Discharge Diagnoses: Active Hospital Problems    Diagnosis     Severe malnutrition ([facility] [address] [E43]     Failure to thrive in adult [R62.7]     A-fib ([facility] [address] [I48.91]     HTN (hypertension) [I10]     Hyperlipidemia [E78.5]     CAD (coronary artery disease) [I25.10]        The patient was seen and examined on day of discharge and this discharge summary is in conjunction with any daily progress note from day of discharge. Hospital Course:       Failure to thrive - with severe protein calorie malnutrition.  Dietician evaluation for nutritional supplements and support.     Nausea and vomiting - of unclear etiology. Present prior to recent hernia surgical repair so doubt significant post-procedural complication. CT scan reassuring w/out evidence of obstruction but w/ possible seroma/hematom favoured - doubt abscess clinically.  EGD 13 July showed mild gastritis, presbyesophagus s/p savory dilation 57F, otherwise normal EGD - followed outpt by Dr. Guillen Oregon MBS ordered w/ evidence of aspiration and oropharyngeal dysphagia. Patient declines PEG. Hospice consulted and appreciated.      HTN/CAD - w/ known CAD but no evidence of active signs/sxs of ischemia/failure. Currently controlled on home meds      HyperLipidemia - controlled on home Statin.  Continue, w/ f/u and med adjustment w/ PCP     Afib - chronic paroxysmal of unspecified and clinically unable to determine etiology.  Normally rate controlled on BBlocker/CCBlocker - continued.  Anticoagulated at baseline on home Eliquis - continued.  Monitored on tele.       CHF - chronic systolic failure w/ reduced EF 20-25% by Echo dated June 2021.  Likely due to ischemic heart disease.  No evidence of acute decompensation.  Continue current medical mgt. Tolerated initial IVF.      Anemia - etiology clinically unable to determine, w/out evidence of active bleeding/hemolysis. Stable and asymptomatic w/out indication for transfusion.      Anxiety/Depression -  Continues on alprazolam 0.25 mg BID but NOT paroxetine (Paxil) despite being on the medication list per son. Lynn Yared by Psychiatry (Dr. Chasity Cutler) last hospitalization.     Vitamin D deficiency -  Continues on cholecalciferol (vit D3) 1,000 units (25 mcg) daily.     Stage II sacrum wound -  Followed by wound care with topical therapy.         Labs: For convenience and continuity at follow-up the following most recent labs are provided:      CBC:    Lab Results   Component Value Date    WBC 6.1 07/22/2021    HGB 12.0 07/22/2021    HCT 35.4 07/22/2021     07/22/2021       Renal:    Lab Results   Component Value Date     07/22/2021    K 3.5 07/22/2021    K 4.0 07/19/2021     07/22/2021    CO2 24 07/22/2021    BUN 10 07/22/2021    CREATININE 0.6 07/22/2021    CALCIUM 9.3 07/22/2021    PHOS 2.2 07/22/2021         Significant Diagnostic Studies    Radiology:   FL MODIFIED BARIUM SWALLOW W VIDEO   Final Result   1. Disordered markedly weak swallowing mechanism. 2. Direct aspiration of thin liquid barium which elicited a cough reflex. 3. Minimal penetration of the larynx with ingestion of nectar consistency   liquid barium which is related to residual contrast material within the   hypopharynx following initial swallow as described above. Please see separate speech pathology report for full discussion of findings   and recommendations. XR ABDOMEN (KUB) (SINGLE AP VIEW)   Final Result   No acute abdominal abnormality identified.                 Consults:     IP CONSULT TO HOSPITALIST  IP CONSULT TO CASE MANAGEMENT  IP CONSULT TO DIETITIAN  IP CONSULT TO PALLIATIVE CARE  IP CONSULT TO HOSPICE    Disposition: SNF w/ Hospice ([address] [facility])     Condition at Discharge: Stable    Discharge Instructions/Follow-up:  w/ PCP PRN    Code Status:  DNR    Activity: activity as tolerated    Diet: NPO - pleasure feeds if desired      Discharge Medications:     Discharge Medication List as of 7/23/2021  2:28 PM           Details   sennosides-docusate sodium (SENOKOT-S) 8.6-50 MG tablet Take 2 tablets by mouth daily, Disp-60 tablet, R-0Print              Details   ALPRAZolam (XANAX) 0.25 MG tablet Take 1 tablet by mouth 3 times daily as needed for Sleep or Anxiety for up to 7 days. , Disp-20 tablet, R-0Print              Details   dilTIAZem (CARDIZEM CD) 120 MG extended release capsule Take 1 capsule by mouth daily, Disp-30 capsule, R-3Normal      clopidogrel (PLAVIX) 75 MG tablet Take 75 mg by mouth dailyHistorical Med      metoprolol succinate (TOPROL XL) 100 MG extended release tablet Take 1 tablet by mouth daily, Disp-90 tablet, R-3Normal      apixaban (ELIQUIS) 2.5 MG TABS tablet Take 1 tablet by mouth 2 times daily, Disp-60 tablet, R-0Normal      acetaminophen (TYLENOL) 500 MG tablet Take 500 mg by mouth every 6 hours as needed for PainHistorical Med             Time Spent on discharge is more than 30 minutes in the examination, evaluation, counseling and review of medications and discharge plan. Signed:    Romeo Hoyt MD   7/23/2021      Thank you NED Justice - MARTIN for the opportunity to be involved in this patient's care. If you have any questions or concerns please feel free to contact me at [phone].

## 2021-07-23 NOTE — PROGRESS NOTES
Hospitalist Progress Note      PCP: NED Torres - CNP    Date of Admission: 7/17/2021    Chief Complaint: constipation    Subjective: no new c/o. Medications:  Reviewed    Infusion Medications    sodium chloride 75 mL/hr at 07/22/21 1824     Scheduled Medications    sodium phosphate  1 enema Rectal Once    apixaban  2.5 mg Oral BID    cholestyramine  1 packet Oral BID    clopidogrel  75 mg Oral Daily    dilTIAZem  120 mg Oral Daily    metoprolol succinate  100 mg Oral Daily    atorvastatin  10 mg Oral Daily    spironolactone  25 mg Oral Daily    Vitamin D  1,000 Units Oral Daily     PRN Meds: morphine, LORazepam, sennosides-docusate sodium, ondansetron **OR** ondansetron, acetaminophen **OR** acetaminophen, sodium phosphate      Intake/Output Summary (Last 24 hours) at 7/23/2021 1024  Last data filed at 7/23/2021 8547  Gross per 24 hour   Intake 1765.27 ml   Output 500 ml   Net 1265.27 ml       Physical Exam Performed:    /88   Pulse 105   Temp 98.1 °F (36.7 °C) (Oral)   Resp 18   Ht 5' 3\" (1.6 m)   Wt 83 lb 5.3 oz (37.8 kg)   SpO2 95%   BMI 14.76 kg/m²     General appearance: No apparent distress, appears stated age and cooperative. HEENT: Pupils equal, round, and reactive to light. Conjunctivae/corneas clear. Neck: Supple, with full range of motion. No jugular venous distention. Trachea midline. Respiratory:  Normal respiratory effort. Clear to auscultation, bilaterally without Rales/Wheezes/Rhonchi. Cardiovascular: Regular rate and rhythm with normal S1/S2 without murmurs, rubs or gallops. Abdomen: Soft, non-tender, non-distended with normal bowel sounds. Musculoskeletal: No clubbing, cyanosis or edema bilaterally. Full range of motion without deformity. Skin: Skin color, texture, turgor normal.  No rashes or lesions. Neurologic:  Neurovascularly intact without any focal sensory/motor deficits.  Cranial nerves: II-XII intact, grossly non-focal.  Psychiatric: Alert and oriented, thought content appropriate, normal insight  Capillary Refill: Brisk,< 3 seconds   Peripheral Pulses: +2 palpable, equal bilaterally       Labs:   Recent Labs     07/22/21  0526   WBC 6.1   HGB 12.0   HCT 35.4*        Recent Labs     07/22/21  0526      K 3.5      CO2 24   BUN 10   CREATININE 0.6   CALCIUM 9.3   PHOS 2.2*     No results for input(s): AST, ALT, BILIDIR, BILITOT, ALKPHOS in the last 72 hours. No results for input(s): INR in the last 72 hours. No results for input(s): Evaristo Oiler in the last 72 hours. Urinalysis:      Lab Results   Component Value Date    NITRU Negative 07/17/2021    WBCUA 0-2 06/06/2021    BACTERIA 4+ 07/07/2020    RBCUA 0-2 06/06/2021    BLOODU Negative 07/17/2021    SPECGRAV 1.020 07/17/2021    GLUCOSEU Negative 07/17/2021       Consults:    IP CONSULT TO HOSPITALIST  IP CONSULT TO CASE MANAGEMENT  IP CONSULT TO DIETITIAN  IP CONSULT TO PALLIATIVE CARE  IP CONSULT TO HOSPICE      Assessment/Plan:    Active Hospital Problems    Diagnosis     Severe malnutrition ([facility] [E43]     Failure to thrive in adult [R62.7]     A-fib ([facility] [address] [facility] [I48.91]     HTN (hypertension) [I10]     Hyperlipidemia [E78.5]     CAD (coronary artery disease) [I25.10]        Failure to thrive - with severe protein calorie malnutrition. Dietician evaluation for nutritional supplements and support. Nausea and vomiting - of unclear etiology. Present prior to recent hernia surgical repair so doubt significant post-procedural complication. CT scan reassuring w/out evidence of obstruction but w/ possible seroma/hematom favoured - doubt abscess clinically.  EGD 13 July showed mild gastritis, presbyesophagus s/p savory dilation 57F, otherwise normal EGD - followed outpt by Dr. Julito Joseph MBS ordered w/ evidence of aspiration and oropharyngeal dysphagia. Patient declines PEG. Hospice consulted and appreciated.      HTN/CAD - w/ known CAD but no evidence of active signs/sxs of ischemia/failure. Currently controlled on home meds w/ vitals reviewed and documented as above.     HyperLipidemia - controlled on home Statin. Continue, w/ f/u and med adjustment w/ PCP     Afib - chronic paroxysmal of unspecified and clinically unable to determine etiology.  Normally rate controlled on BBlocker/CCBlocker - continued.  Anticoagulated at baseline on home Eliquis - continued.  Monitored on tele.       CHF - chronic systolic failure w/ reduced EF 20-25% by Echo dated June 2021.  Likely due to ischemic heart disease.  No evidence of acute decompensation.  Continue current medical mgt. Tolerated initial IVF.      Anemia - etiology clinically unable to determine, w/out evidence of active bleeding/hemolysis. Stable and asymptomatic w/out indication for transfusion. Follow serial labs.  Reviewed and documented as above. Anxiety/Depression -  Continues on alprazolam 0.25 mg BID but NOT paroxetine (Paxil) despite being on the medication list per son. Debby Anaya by Psychiatry (Dr. Yusra Rodriguez) last hospitalization. Vitamin D deficiency -  Continues on cholecalciferol (vit D3) 1,000 units (25 mcg) daily. Stage II sacrum wound -  Followed by wound care with topical therapy.       DVT Prophylaxis: Eliquis     Recent Labs     07/22/21  0526        Diet: Diet NPO  Code Status: Limited - (NO to all four questions)      PT/OT Eval Status: seen w/ recs for SNF. Dispo - Medically stable to SNF/Hospice Wed 21 July pending placement decision. Discussed face to face w/ son Ramses Mean 21/22/23 July.      Lizz Hatch MD

## 2021-07-23 NOTE — PROGRESS NOTES
PIV removed. Tip intact. Dressing in place. Discharge paperwork went over with and given to EMS staff. Verbalizes understanding. Pt lock box emptied. Pt wheeled via stretcher by to EMS with all belongings. Pt discharge to [facility] in stable condition. Report called to Rupa Hernandez RN. Prescriptions sent with patient in EMS.

## 2021-07-26 NOTE — TELEPHONE ENCOUNTER
Refill request for aldactone  medication.      [facility]      Last visit - 2-3-2021     Pending visit - 8-9-2021    Last refill -7-      Medication Contract signed -   Hermann mena-         Additional Comments

## 2021-08-11 NOTE — DISCHARGE SUMMARY
clinically unable to determine etiology.  Normally rate controlled on BBlocker - continued. Anticoagulated at baseline on home Eliquis - continued.  Monitored on tele.      CHF - chronic systolic failure w/ reduced EF 20-25% by Echo dated June 2021.  Likely due to ischemic heart disease.  No evidence of acute decompensation.  Continue current medical mgt. Tolerated initial IVF.      Anemia - etiology clinically unable to determine, w/out evidence of active bleeding/hemolysis. Stable and asymptomatic w/out indication for transfusion. Follow serial labs.  Reviewed and documented as above.     HypoPhosphatemia - etiology clinically unable to determine.  Follow serial labs and replace PRN      Severe protein calorie malnutrition. Dietician consulted.     Possible Depression  Patient is very tearful and has had ongoing GI issues and weight loss for over a year. Family requested psych consult.       Physical Exam Performed:     BP (!) 154/99   Pulse 75   Temp 97.9 °F (36.6 °C) (Oral)   Resp 16   Ht 5' 2\" (1.575 m)   Wt 87 lb 2 oz (39.5 kg)   SpO2 96%   BMI 15.94 kg/m²       General appearance:  Cachetic, elderly female, pleasant and cooperative. HEENT:  Normal cephalic, atraumatic without obvious deformity. Pupils equal, round, and reactive to light. Extra ocular muscles intact. Conjunctivae/corneas clear. Neck: Supple, with full range of motion. No jugular venous distention. Trachea midline. Respiratory:  Normal respiratory effort. Clear to auscultation, bilaterally without Rales/Wheezes/Rhonchi. Cardiovascular:  Regular rate and rhythm with normal S1/S2 without murmurs, rubs or gallops. Abdomen: Soft, non-tender, non-distended with normal bowel sounds. Musculoskeletal:  No clubbing, cyanosis or edema bilaterally. Full range of motion without deformity. Skin: Skin color, texture, turgor normal.  No rashes or lesions. Neurologic:  Neurovascularly intact without any focal sensory/motor deficits.  Cranial nerves: II-XII intact, grossly non-focal.  Psychiatric:  Alert and oriented, thought content appropriate, normal insight  Capillary Refill: Brisk,< 3 seconds   Peripheral Pulses: +2 palpable, equal bilaterally       Labs: For convenience and continuity at follow-up the following most recent labs are provided:      CBC:    Lab Results   Component Value Date    WBC 6.1 07/22/2021    HGB 12.0 07/22/2021    HCT 35.4 07/22/2021     07/22/2021       Renal:    Lab Results   Component Value Date     07/22/2021    K 3.5 07/22/2021    K 4.0 07/19/2021     07/22/2021    CO2 24 07/22/2021    BUN 10 07/22/2021    CREATININE 0.6 07/22/2021    CALCIUM 9.3 07/22/2021    PHOS 2.2 07/22/2021         Significant Diagnostic Studies    Radiology:   US GALLBLADDER RUQ   Final Result   Unremarkable right upper quadrant ultrasound. FL ESOPHAGRAM   Final Result   1. Short segmental area of marked luminal narrowing of the proximal esophagus   near the level of the thoracic inlet. Exact etiology of the finding is   indeterminate. Follow-up endoscopy is recommended for a more complete   evaluation. 2. Esophageal dysmotility with disordered primary peristalsis and   visualization of tertiary contractions as described. 3. Mild nonspecific smooth luminal narrowing of the distal esophagus. Finding may in part be related to esophageal dysmotility although true   luminal narrowing including changes of subtle stricture involving the distal   esophagus also in the differential diagnosis. CT ABDOMEN PELVIS W IV CONTRAST Additional Contrast? None   Final Result   1. Small nonspecific right lower quadrant rim enhancing fluid collection. The differential includes seroma, hematoma and abscess. 2. Diverticulosis. 3. Mild bibasilar atelectasis versus pneumonia. XR CHEST PORTABLE   Final Result   Trace pleural effusions. Sequela of granulomatous disease.   Unchanged coarse   parenchymal opacity, Historical Med      vitamin D 25 MCG (1000 UT) CAPS Take 1,000 Units by mouth daily  Historical Med      therapeutic multivitamin-minerals (THERAGRAN-M) tablet Take 1 tablet by mouth daily  Historical Med      vitamin E 400 UNIT capsule Take 400 Units by mouth daily  Historical Med      Calcium Citrate-Vitamin D 200-250 MG-UNIT TABS Take by mouth  Historical Med      Ascorbic Acid (VITAMIN C) 500 MG tablet Take 1,000 mg by mouth daily  Historical Med      folic acid (FOLVITE) 1 MG tablet Take 400 mcg by mouth daily  Historical Med      Zinc 50 MG CAPS Take by mouth  Historical Med       !! - Potential duplicate medications found. Please discuss with provider. Time Spent on discharge is more than 30 minutes in the examination, evaluation, counseling and review of medications and discharge plan. Signed:    NED Arguello CNP   8/11/2021      Thank you NED Dunn CNP for the opportunity to be involved in this patient's care. If you have any questions or concerns please feel free to contact me at [phone].

## 2023-03-20 NOTE — TELEPHONE ENCOUNTER
[phone] (home)   Spoke with patient , she said speech therapy said they would help her schedule. She is aware if they dont to call back. Patient baseline mental status/Awake

## 2024-03-12 NOTE — PROGRESS NOTES
Patient and family given discharge instructions. All questions and concerns were addressed. Patient wheeled to car with all patient belongings. Prescriptions picked up from pharmacy. No

## (undated) DEVICE — [HIGH FLOW INSUFFLATOR,  DO NOT USE IF PACKAGE IS DAMAGED,  KEEP DRY,  KEEP AWAY FROM SUNLIGHT,  PROTECT FROM HEAT AND RADIOACTIVE SOURCES.]: Brand: PNEUMOSURE

## (undated) DEVICE — TROCAR: Brand: KII FIOS FIRST ENTRY

## (undated) DEVICE — MEGA SUTURECUT ND: Brand: ENDOWRIST

## (undated) DEVICE — Device

## (undated) DEVICE — SUTURE VCRL + SZ 3-0 L18IN ABSRB UD SH 1/2 CIR TAPERCUT NDL VCP864D

## (undated) DEVICE — REDUCER: Brand: ENDOWRIST

## (undated) DEVICE — GOWN,REINF,POLY,AURORA,XLNG/XXL,STRL: Brand: MEDLINE

## (undated) DEVICE — FENESTRATED BIPOLAR FORCEPS: Brand: ENDOWRIST

## (undated) DEVICE — CANNULA SEAL

## (undated) DEVICE — SAVARY-GILLIARD WIRE GUIDE: Brand: SAVARY-GILLIARD

## (undated) DEVICE — BLADELESS OBTURATOR: Brand: WECK VISTA

## (undated) DEVICE — SCISSORS SURG DIA8MM MPLR CRV ENDOWRIST

## (undated) DEVICE — SUTURE VCRL SZ 2-0 L27IN ABSRB VLT L26MM SH 1/2 CIR J317H

## (undated) DEVICE — SUTURE STRATAFIX SPRL MCRYL + SZ 3 0 L8IN ABSRB UD L26MM SH SXMP1B427

## (undated) DEVICE — SUTURE MCRYL + SZ 4-0 L18IN ABSRB UD L19MM PS-2 3/8 CIR MCP496G

## (undated) DEVICE — SUTURE VCRL + SZ 0 L27IN ABSRB VLT L26MM UR-6 5/8 CIR VCP603H

## (undated) DEVICE — FORCEPS BX L240CM JAW DIA2.8MM L CAP W/ NDL MIC MESH TOOTH

## (undated) DEVICE — ARM DRAPE

## (undated) DEVICE — SYSTEM SMK EVAC LAP TBNG FILTER HSNG BENT STYL PNK SEE CLR

## (undated) DEVICE — SOLUTION IRRIG 2000ML STRL H2O UROMATIC PLAS CONT USP

## (undated) DEVICE — ELECTRODE ECG MONITR FOAM TEAR DROP ADLT RED

## (undated) DEVICE — TIP COVER ACCESSORY

## (undated) DEVICE — TOTAL TRAY, DB, 100% SILI FOLEY, 16FR 10: Brand: MEDLINE

## (undated) DEVICE — CONMED SCOPE SAVER BITE BLOCK, 20X27 MM: Brand: SCOPE SAVER

## (undated) DEVICE — COLUMN DRAPE

## (undated) DEVICE — SEAL

## (undated) DEVICE — SOLUTION IRRG STRL H2O 500 ML BTL 16/CA

## (undated) DEVICE — ENDOSCOPIC KIT 2 12 FT OP4 DE2 GWN SYR